# Patient Record
Sex: FEMALE | HISPANIC OR LATINO | Employment: STUDENT | URBAN - METROPOLITAN AREA
[De-identification: names, ages, dates, MRNs, and addresses within clinical notes are randomized per-mention and may not be internally consistent; named-entity substitution may affect disease eponyms.]

---

## 2020-10-06 ENCOUNTER — OFFICE VISIT (OUTPATIENT)
Dept: OBSTETRICS AND GYNECOLOGY | Facility: CLINIC | Age: 20
End: 2020-10-06

## 2020-10-06 VITALS
SYSTOLIC BLOOD PRESSURE: 118 MMHG | BODY MASS INDEX: 28.47 KG/M2 | HEIGHT: 60 IN | WEIGHT: 145 LBS | DIASTOLIC BLOOD PRESSURE: 78 MMHG

## 2020-10-06 DIAGNOSIS — R30.0 DYSURIA: ICD-10-CM

## 2020-10-06 DIAGNOSIS — B37.9 YEAST INFECTION: ICD-10-CM

## 2020-10-06 DIAGNOSIS — N90.89 VULVAR LESION: ICD-10-CM

## 2020-10-06 DIAGNOSIS — N76.0 ACUTE VAGINITIS: Primary | ICD-10-CM

## 2020-10-06 LAB
BILIRUB BLD-MCNC: NEGATIVE MG/DL
GLUCOSE UR STRIP-MCNC: NEGATIVE MG/DL
KETONES UR QL: NEGATIVE
LEUKOCYTE EST, POC: ABNORMAL
NITRITE UR-MCNC: NEGATIVE MG/ML
PH UR: 6 [PH] (ref 5–8)
PROT UR STRIP-MCNC: ABNORMAL MG/DL
RBC # UR STRIP: ABNORMAL /UL
SP GR UR: 1.02 (ref 1–1.03)
UROBILINOGEN UR QL: NORMAL
WET PREP GENITAL: NORMAL

## 2020-10-06 PROCEDURE — 99213 OFFICE O/P EST LOW 20 MIN: CPT | Performed by: NURSE PRACTITIONER

## 2020-10-06 PROCEDURE — 87210 SMEAR WET MOUNT SALINE/INK: CPT | Performed by: NURSE PRACTITIONER

## 2020-10-06 PROCEDURE — 81002 URINALYSIS NONAUTO W/O SCOPE: CPT | Performed by: NURSE PRACTITIONER

## 2020-10-06 RX ORDER — ACYCLOVIR 400 MG/1
400 TABLET ORAL 3 TIMES DAILY
Qty: 21 TABLET | Refills: 0 | Status: SHIPPED | OUTPATIENT
Start: 2020-10-06 | End: 2020-10-13

## 2020-10-06 RX ORDER — NORETHINDRONE ACETATE AND ETHINYL ESTRADIOL 1MG-20(21)
KIT ORAL
COMMUNITY
Start: 2020-07-28 | End: 2020-10-06 | Stop reason: SDUPTHER

## 2020-10-06 RX ORDER — NORETHINDRONE ACETATE AND ETHINYL ESTRADIOL 1MG-20(21)
1 KIT ORAL DAILY
Qty: 28 TABLET | Refills: 12 | OUTPATIENT
Start: 2020-10-06 | End: 2022-01-05

## 2020-10-06 RX ORDER — CABERGOLINE 0.5 MG/1
0.25 TABLET ORAL 2 TIMES WEEKLY
Qty: 8 TABLET | Refills: 12 | Status: SHIPPED | OUTPATIENT
Start: 2020-10-08 | End: 2022-01-01

## 2020-10-06 RX ORDER — FLUCONAZOLE 150 MG/1
TABLET ORAL
Qty: 3 TABLET | Refills: 1 | Status: SHIPPED | OUTPATIENT
Start: 2020-10-06 | End: 2021-01-15

## 2020-10-06 NOTE — PROGRESS NOTES
"Chief Complaint   Patient presents with   • Vaginal Discharge   • Dysuria   • Vaginal Itching       Subjective   HPI  Madalyn Alexandre is a 20 y.o. female, No obstetric history on file., who presents for possible UTI or yeast infection.     Urinary Tract Infection  Patient complains of yellow vaginal discharge, mild burning with urination, and itching. She has had symptoms for 5 days. Patient also complains of small paper cut like lesions. Patient denies urinary frequnecy and urgency, fever, chills. . Patient does not have a history of recurrent UTI or vaginal infections. Patient does admit to having a new sexual partner.    Pt currently being treated for strep throat, although her strep test was negative. Her vaginal symptoms started before she started taking abx.       Health Maintenance   Topic Date Due   • Annual Gynecologic Pelvic and Breast Exam  1998   • ANNUAL PHYSICAL  04/03/2001   • HPV VACCINES (1 - 2-dose series) 04/03/2009   • TDAP/TD VACCINES (1 - Tdap) 04/03/2017   • INFLUENZA VACCINE  08/01/2020   • HEPATITIS C SCREENING  09/08/2020   • CHLAMYDIA SCREENING  09/08/2020   • PAP SMEAR  09/08/2020   • Pneumococcal Vaccine 0-64  Aged Out   • MENINGOCOCCAL VACCINE (Normal Risk)  Aged Out       The additional following portions of the patient's history were reviewed and updated as appropriate: allergies, current medications, past family history, past medical history, past social history, past surgical history and problem list.    Review of Systems   Constitutional: Negative.    Gastrointestinal: Negative.    Genitourinary:        Itching, discharge, vulvar lesions   Psychiatric/Behavioral: Negative.    All other systems reviewed and are negative.      I have reviewed and agree with the HPI, ROS, and historical information as entered above. Mae Griffin, APRN    Objective   /78   Ht 152.4 cm (60\")   Wt 65.8 kg (145 lb)   Breastfeeding No   BMI 28.32 kg/m²     Physical Exam  Vitals signs and " nursing note reviewed. Exam conducted with a chaperone present.   Constitutional:       Appearance: Normal appearance.   Pulmonary:      Effort: Pulmonary effort is normal.   Abdominal:      General: Abdomen is flat.      Palpations: Abdomen is soft.   Genitourinary:     Labia:         Right: No rash, tenderness or lesion.         Left: No rash, tenderness or lesion.       Urethra: No urethral lesion.      Vagina: Vaginal discharge (copious amt of white/yellow dischage) and lesions (group of ulcerative type lesion at posterior vaginal opening) present.      Cervix: Discharge present. No friability, lesion or cervical bleeding.      Uterus: Normal. Not enlarged and not tender.       Adnexa: Right adnexa normal and left adnexa normal.      Rectum: Normal.   Lymphadenopathy:      Lower Body: No right inguinal adenopathy. No left inguinal adenopathy.   Neurological:      Mental Status: She is alert and oriented to person, place, and time.   Psychiatric:         Behavior: Behavior normal.         Assessment/Plan     Assessment     Problem List Items Addressed This Visit     None      Visit Diagnoses     Acute vaginitis    -  Primary    Relevant Orders    Chlamydia trachomatis, Neisseria gonorrhoeae, Trichomonas vaginalis, PCR - Swab, Cervix    POC Wet Prep (Completed)    Dysuria        Relevant Orders    Urine Culture - Urine, Urine, Clean Catch    POC Urinalysis Dipstick (Completed)    Vulvar lesion        Relevant Orders    HSV 1/2, PCR - Swab, Vagina    Yeast infection        Relevant Medications    fluconazole (Diflucan) 150 MG tablet          Plan     1.   Wet prep +yeast, severe, Rx diflucan.   2.   Small ulcerative type lesions, HSV culture sent, will start acyclovir.  3.   New partner, Nuswab sent. Enc condoms.   4.   CCUA large leuks, sent for culture, will treat if indicated.     Mae Griffin, DEANNA  10/06/2020

## 2020-10-08 LAB
BACTERIA UR CULT: NORMAL
BACTERIA UR CULT: NORMAL
C TRACH RRNA SPEC QL NAA+PROBE: POSITIVE
N GONORRHOEA RRNA SPEC QL NAA+PROBE: NEGATIVE
T VAGINALIS DNA SPEC QL NAA+PROBE: NEGATIVE

## 2020-10-09 ENCOUNTER — TELEPHONE (OUTPATIENT)
Dept: OBSTETRICS AND GYNECOLOGY | Facility: CLINIC | Age: 20
End: 2020-10-09

## 2020-10-09 LAB
HSV1 DNA SPEC QL NAA+PROBE: NEGATIVE
HSV2 DNA SPEC QL NAA+PROBE: NEGATIVE

## 2020-10-09 RX ORDER — AZITHROMYCIN 500 MG/1
1000 TABLET, FILM COATED ORAL ONCE
Qty: 2 TABLET | Refills: 0 | Status: SHIPPED | OUTPATIENT
Start: 2020-10-09 | End: 2020-10-09

## 2020-11-03 ENCOUNTER — OFFICE VISIT (OUTPATIENT)
Dept: OBSTETRICS AND GYNECOLOGY | Facility: CLINIC | Age: 20
End: 2020-11-03

## 2020-11-03 VITALS
HEIGHT: 59 IN | BODY MASS INDEX: 29.84 KG/M2 | WEIGHT: 148 LBS | DIASTOLIC BLOOD PRESSURE: 60 MMHG | SYSTOLIC BLOOD PRESSURE: 98 MMHG

## 2020-11-03 DIAGNOSIS — Z01.419 WOMEN'S ANNUAL ROUTINE GYNECOLOGICAL EXAMINATION: Primary | ICD-10-CM

## 2020-11-03 DIAGNOSIS — Z30.41 ORAL CONTRACEPTIVE USE: ICD-10-CM

## 2020-11-03 DIAGNOSIS — Z20.2 CHLAMYDIA CONTACT, TREATED: ICD-10-CM

## 2020-11-03 PROCEDURE — 99395 PREV VISIT EST AGE 18-39: CPT | Performed by: NURSE PRACTITIONER

## 2020-11-03 RX ORDER — AMOXICILLIN 500 MG/1
CAPSULE ORAL
COMMUNITY
Start: 2020-10-02 | End: 2021-01-15

## 2020-11-03 NOTE — PROGRESS NOTES
GYN Annual Exam     CC - Here for annual exam.        HPI  Madalyn Alexandre is a 20 y.o. female, , who presents for annual well woman exam. Patient's last menstrual period was 2020 (within weeks)..  Periods are irregular because patient reports she forgets to take her cabergoline.  She has had irregular periods since starting her period.  When she does have her periods, she bleeds about 3 days, moderate flow, with blood clots.  Dysmenorrhea:mild, occurring premenstrually.  Patient reports problems with: none.  There were no changes to her medical or surgical history since her last visit.. Partner Status: Marital Status: single.  New Partners since last visit: no.  Desires STD Screening: yes.  She tested positive for Chlamydia on 10/6/2020 and she reports that she did complete the medication for it.     Additional OB/GYN History   Current contraception: contraceptive methods: OCP (estrogen/progesterone)  Desires to: continue contraception  Last Pap :   Last Completed Pap Smear       Status Date      PAP SMEAR Done 2019 Negative, HPV negative        History of abnormal Pap smear: no  Family history of uterine, colon, breast, or ovarian cancer: no  Performs monthly Self-Breast Exam: yes  Exercises Regularly:no  Feelings of Anxiety or Depression: no  Tobacco Usage?: No   OB History        0    Para   0    Term   0       0    AB   0    Living   0       SAB   0    TAB   0    Ectopic   0    Molar   0    Multiple   0    Live Births   0                Health Maintenance   Topic Date Due   • Annual Gynecologic Pelvic and Breast Exam  1998   • ANNUAL PHYSICAL  2001   • HPV VACCINES (1 - 2-dose series) 2009   • TDAP/TD VACCINES (1 - Tdap) 2017   • INFLUENZA VACCINE  2020   • HEPATITIS C SCREENING  2020   • CHLAMYDIA SCREENING  10/06/2021   • PAP SMEAR  2022   • Pneumococcal Vaccine 0-64  Aged Out   • MENINGOCOCCAL VACCINE (Normal Risk)  Aged Out       The  "additional following portions of the patient's history were reviewed and updated as appropriate: allergies, current medications, past family history, past medical history, past social history, past surgical history and problem list.    Review of Systems   Constitutional: Negative.    HENT: Negative.    Eyes: Negative.    Respiratory: Negative.    Cardiovascular: Negative.    Gastrointestinal: Positive for constipation.   Endocrine: Negative.    Genitourinary: Negative.    Musculoskeletal: Negative.    Skin: Negative.    Allergic/Immunologic: Negative.    Neurological: Negative.    Hematological: Negative.    Psychiatric/Behavioral: Negative.      All other systems reviewed and are negative.     I have reviewed and agree with the HPI, ROS, and historical information as entered above. Mae Griffin, APRN    Objective   BP 98/60   Ht 149.9 cm (59\")   Wt 67.1 kg (148 lb)   LMP 09/30/2020 (Within Weeks)   Breastfeeding No   BMI 29.89 kg/m²     Physical Exam  Vitals signs and nursing note reviewed. Exam conducted with a chaperone present.   Constitutional:       Appearance: She is well-developed.   HENT:      Head: Normocephalic and atraumatic.   Neck:      Musculoskeletal: Normal range of motion. No muscular tenderness.      Thyroid: No thyroid mass or thyromegaly.   Cardiovascular:      Rate and Rhythm: Normal rate and regular rhythm.      Heart sounds: No murmur.   Pulmonary:      Effort: Pulmonary effort is normal. No retractions.      Breath sounds: Normal breath sounds. No wheezing, rhonchi or rales.   Chest:      Chest wall: No mass or tenderness.      Breasts:         Right: Normal. No mass, nipple discharge, skin change or tenderness.         Left: Normal. No mass, nipple discharge, skin change or tenderness.   Abdominal:      General: Bowel sounds are normal.      Palpations: Abdomen is soft. Abdomen is not rigid. There is no mass.      Tenderness: There is no abdominal tenderness. There is no guarding.      " Hernia: No hernia is present. There is no hernia in the left inguinal area.   Genitourinary:     Labia:         Right: No rash, tenderness or lesion.         Left: No rash, tenderness or lesion.       Vagina: Normal. No vaginal discharge or lesions.      Cervix: No cervical motion tenderness, discharge, lesion or cervical bleeding.      Uterus: Normal. Not enlarged, not fixed and not tender.       Adnexa:         Right: No mass or tenderness.          Left: No mass or tenderness.        Rectum: No external hemorrhoid.   Neurological:      Mental Status: She is alert and oriented to person, place, and time.   Psychiatric:         Behavior: Behavior normal.            Assessment and Plan    Problem List Items Addressed This Visit     None      Visit Diagnoses     Women's annual routine gynecological examination    -  Primary    Relevant Orders    Pap IG, Ct-Ng, Rfx HPV ASCU    Oral contraceptive use        Chlamydia contact, treated        Test of cure today          1. GYN annual well woman exam.   2. Encouraged use of condoms for STD prevention.  3. Reviewed monthly self breast exams.  Instructed to call with lumps, pain, or breast discharge.    4. Reviewed exercise as a preventative health measures.   5. Reccommended Flu Vaccine in Fall of each year.  6. RTC in 1 year or PRN with problems  7. Other: BINTA for chlamydia today, will call with results      DEANNA Parsons  11/03/2020

## 2020-11-10 DIAGNOSIS — Z01.419 WOMEN'S ANNUAL ROUTINE GYNECOLOGICAL EXAMINATION: ICD-10-CM

## 2021-01-15 ENCOUNTER — TELEMEDICINE (OUTPATIENT)
Dept: FAMILY MEDICINE CLINIC | Facility: TELEHEALTH | Age: 21
End: 2021-01-15

## 2021-01-15 VITALS — TEMPERATURE: 98.4 F

## 2021-01-15 DIAGNOSIS — R59.0 LYMPHADENOPATHY, ANTERIOR CERVICAL: Primary | ICD-10-CM

## 2021-01-15 PROCEDURE — 99213 OFFICE O/P EST LOW 20 MIN: CPT | Performed by: NURSE PRACTITIONER

## 2021-01-15 RX ORDER — AMOXICILLIN AND CLAVULANATE POTASSIUM 875; 125 MG/1; MG/1
1 TABLET, FILM COATED ORAL 2 TIMES DAILY
Qty: 14 TABLET | Refills: 0 | Status: SHIPPED | OUTPATIENT
Start: 2021-01-15 | End: 2021-01-22

## 2021-01-15 NOTE — PATIENT INSTRUCTIONS
If no improvement in size of lymph node with completion of antibiotic or if it worsens, seek further evaluation at Urgent Care or with your Primary Care Provider.    Lymphadenopathy    Lymphadenopathy means that your lymph glands are swollen or larger than normal (enlarged). Lymph glands, also called lymph nodes, are collections of tissue that filter bacteria, viruses, and waste from your bloodstream. They are part of your body's disease-fighting system (immune system), which protects your body from germs.  There may be different causes of lymphadenopathy, depending on where it is in your body. Some types go away on their own. Lymphadenopathy can occur anywhere that you have lymph glands, including these areas:  · Neck (cervical lymphadenopathy).  · Chest (mediastinal lymphadenopathy).  · Lungs (hilar lymphadenopathy).  · Underarms (axillary lymphadenopathy).  · Groin (inguinal lymphadenopathy).  When your immune system responds to germs, infection-fighting cells and fluid build up in your lymph glands. This causes some swelling and enlargement. If the lymph glands do not go back to normal after you have an infection or disease, your health care provider may do tests. These tests help to monitor your condition and find the reason why the glands are still swollen and enlarged.  Follow these instructions at home:  · Get plenty of rest.  · Take over-the-counter and prescription medicines only as told by your health care provider. Your health care provider may recommend over-the-counter medicines for pain.  · If directed, apply heat to swollen lymph glands as often as told by your health care provider. Use the heat source that your health care provider recommends, such as a moist heat pack or a heating pad.  ? Place a towel between your skin and the heat source.  ? Leave the heat on for 20-30 minutes.  ? Remove the heat if your skin turns bright red. This is especially important if you are unable to feel pain, heat, or  cold. You may have a greater risk of getting burned.  · Check your affected lymph glands every day for changes. Check other lymph gland areas as told by your health care provider. Check for changes such as:  ? More swelling.  ? Sudden increase in size.  ? Redness or pain.  ? Hardness.  · Keep all follow-up visits as told by your health care provider. This is important.  Contact a health care provider if you have:  · Swelling that gets worse or spreads to other areas.  · Problems with breathing.  · Lymph glands that:  ? Are still swollen after 2 weeks.  ? Have suddenly gotten bigger.  ? Are red, painful, or hard.  · A fever or chills.  · Fatigue.  · A sore throat.  · Pain in your abdomen.  · Weight loss.  · Night sweats.  Get help right away if you have:  · Fluid leaking from an enlarged lymph gland.  · Severe pain.  · Chest pain.  · Shortness of breath.  Summary  · Lymphadenopathy means that your lymph glands are swollen or larger than normal (enlarged).  · Lymph glands (also called lymph nodes) are collections of tissue that filter bacteria, viruses, and waste from the bloodstream. They are part of your body's disease-fighting system (immune system).  · Lymphadenopathy can occur anywhere that you have lymph glands.  · If your enlarged and swollen lymph glands do not go back to normal after you have an infection or disease, your health care provider may do tests to monitor your condition and find the reason why the glands are still swollen and enlarged.  · Check your affected lymph glands every day for changes. Check other lymph gland areas as told by your health care provider.  This information is not intended to replace advice given to you by your health care provider. Make sure you discuss any questions you have with your health care provider.  Document Revised: 11/30/2018 Document Reviewed: 11/02/2018  Elsevier Patient Education © 2020 [employer]

## 2021-01-15 NOTE — PROGRESS NOTES
Chief Complaint  Swollen Glands (chills and side of neck is swollen)    Subjective          Madalyn Alexandre presents to [facility] for   Felt enlarged left swollen gland. She denies sore throat or ear pain. She does report some teeth sensitivity to cold and has not been to the dentist in well over a year with possibility of cavity. She denies any previous pain in this area before, however. She has checked her temperature, but has been afebrile, today she has felt chilled and fatigued. She reports a history of strep throat, last fall and chlamydia.    Swollen Glands  This is a new problem. The current episode started in the past 7 days. The problem occurs constantly. The problem has been unchanged. Associated symptoms include chills, fatigue and swollen glands. Pertinent negatives include no congestion, coughing, fever, headaches, joint swelling, myalgias, nausea, neck pain, rash, sore throat, vertigo, visual change or weakness. Nothing aggravates the symptoms. She has tried nothing for the symptoms.       Objective   Vital Signs:   Temp 98.4 °F (36.9 °C) (Oral)     Physical Exam  Constitutional:       Appearance: She is not ill-appearing.   HENT:      Head:      Salivary Glands: Right salivary gland is not diffusely enlarged. Left salivary gland is not diffusely enlarged.      Nose: No congestion or rhinorrhea.      Mouth/Throat:      Mouth: Mucous membranes are moist.      Pharynx: Posterior oropharyngeal erythema present. No oropharyngeal exudate or uvula swelling.   Neck:     Neurological:      Mental Status: She is alert.        Result Review :                 Assessment and Plan    Problem List Items Addressed This Visit     None          Follow Up   No follow-ups on file.  Patient was given instructions and counseling regarding her condition or for health maintenance advice. Please see specific information pulled into the AVS if appropriate.

## 2021-02-07 PROCEDURE — U0004 COV-19 TEST NON-CDC HGH THRU: HCPCS | Performed by: NURSE PRACTITIONER

## 2022-01-01 RX ORDER — CABERGOLINE 0.5 MG/1
0.25 TABLET ORAL 2 TIMES WEEKLY
Qty: 8 TABLET | Refills: 0 | Status: SHIPPED | OUTPATIENT
Start: 2022-01-01 | End: 2022-04-12 | Stop reason: SDUPTHER

## 2022-01-05 PROCEDURE — U0004 COV-19 TEST NON-CDC HGH THRU: HCPCS | Performed by: NURSE PRACTITIONER

## 2022-04-12 ENCOUNTER — OFFICE VISIT (OUTPATIENT)
Dept: OBSTETRICS AND GYNECOLOGY | Facility: CLINIC | Age: 22
End: 2022-04-12

## 2022-04-12 VITALS
BODY MASS INDEX: 28.83 KG/M2 | HEIGHT: 59 IN | WEIGHT: 143 LBS | SYSTOLIC BLOOD PRESSURE: 96 MMHG | DIASTOLIC BLOOD PRESSURE: 60 MMHG

## 2022-04-12 DIAGNOSIS — N92.6 IRREGULAR PERIODS: ICD-10-CM

## 2022-04-12 DIAGNOSIS — Z01.419 WOMEN'S ANNUAL ROUTINE GYNECOLOGICAL EXAMINATION: Primary | ICD-10-CM

## 2022-04-12 DIAGNOSIS — E22.1 HYPERPROLACTINEMIA: ICD-10-CM

## 2022-04-12 PROCEDURE — 99395 PREV VISIT EST AGE 18-39: CPT | Performed by: NURSE PRACTITIONER

## 2022-04-12 RX ORDER — CABERGOLINE 0.5 MG/1
0.5 TABLET ORAL 2 TIMES WEEKLY
Qty: 8 TABLET | Refills: 12 | Status: SHIPPED | OUTPATIENT
Start: 2022-04-14

## 2022-04-12 NOTE — PROGRESS NOTES
GYN Annual Exam     CC - Here for annual exam.        HPI  Madalyn Alexandre is a 22 y.o. female, , who presents for annual well woman exam. Patient's last menstrual period was 2021 (within days)..  Periods are irregular, patient is not sure how often that occur, she is supposed to be taking cabergoline. She has been taking it since she was 14, but she forgets to take it a lot.     Dysmenorrhea:none.  Patient reports problems with: none.  There were no changes to her medical or surgical history since her last visit.. Partner Status: Marital Status: single.  She is sexually active. She has had new partners since her last STD testing. She does desire STD testing. . She has had her HPV vaccines per patient.    Additional OB/GYN History   Current contraception: contraceptive methods: Condoms  Desires to: do not start contraception  Last Pap : 11/3/20. Results: Rfx hpv normal.   Last Completed Pap Smear          Ordered - PAP SMEAR (Every 3 Years) Ordered on 2020  Pap IG, Ct-Ng, Rfx HPV ASCU    2019  Done - Negative, HPV negative              History of abnormal Pap smear: no  Family history of uterine, colon, breast, or ovarian cancer: no  Performs monthly Self-Breast Exam: yes  Exercises Regularly:yes  Feelings of Anxiety or Depression: no  Tobacco Usage?: No   OB History        0    Para   0    Term   0       0    AB   0    Living   0       SAB   0    IAB   0    Ectopic   0    Molar   0    Multiple   0    Live Births   0                Health Maintenance   Topic Date Due   • ANNUAL PHYSICAL  Never done   • COVID-19 Vaccine (1) Never done   • HPV VACCINES (1 - 2-dose series) Never done   • TDAP/TD VACCINES (1 - Tdap) Never done   • HEPATITIS C SCREENING  Never done   • CHLAMYDIA SCREENING  2021   • Annual Gynecologic Pelvic and Breast Exam  2021   • INFLUENZA VACCINE  2022   • PAP SMEAR  2023   • Pneumococcal Vaccine 0-64  Aged Out       The  "additional following portions of the patient's history were reviewed and updated as appropriate: allergies, current medications, past family history, past medical history, past social history, past surgical history and problem list.    Review of Systems   Constitutional: Negative.    HENT: Negative.    Respiratory: Negative.    Cardiovascular: Negative.    Gastrointestinal: Negative.    Genitourinary: Negative.         Irreg periods   Musculoskeletal: Negative.    Skin: Negative.    Allergic/Immunologic: Negative.    Neurological: Negative.    Hematological: Negative.    Psychiatric/Behavioral: Negative.          I have reviewed and agree with the HPI, ROS, and historical information as entered above. Mae Elias, APRN    Objective   BP 96/60   Ht 149.9 cm (59.02\")   Wt 64.9 kg (143 lb)   LMP 12/14/2021 (Within Days)   BMI 28.87 kg/m²     Physical Exam  Vitals and nursing note reviewed. Exam conducted with a chaperone present.   Constitutional:       Appearance: She is well-developed.   HENT:      Head: Normocephalic and atraumatic.   Neck:      Thyroid: No thyroid mass or thyromegaly.   Cardiovascular:      Rate and Rhythm: Normal rate and regular rhythm.      Heart sounds: No murmur heard.  Pulmonary:      Effort: Pulmonary effort is normal. No retractions.      Breath sounds: Normal breath sounds. No wheezing, rhonchi or rales.   Chest:      Chest wall: No mass or tenderness.   Breasts:      Right: Normal. No mass, nipple discharge, skin change or tenderness.      Left: Normal. No mass, nipple discharge, skin change or tenderness.       Abdominal:      General: Bowel sounds are normal.      Palpations: Abdomen is soft. Abdomen is not rigid. There is no mass.      Tenderness: There is no abdominal tenderness. There is no guarding.      Hernia: No hernia is present. There is no hernia in the left inguinal area.   Genitourinary:     Labia:         Right: No rash, tenderness or lesion.         Left: No rash, " tenderness or lesion.       Vagina: Normal. No vaginal discharge or lesions.      Cervix: No cervical motion tenderness, discharge, lesion or cervical bleeding.      Uterus: Normal. Not enlarged, not fixed and not tender.       Adnexa:         Right: No mass or tenderness.          Left: No mass or tenderness.        Rectum: No external hemorrhoid.   Musculoskeletal:      Cervical back: Normal range of motion. No muscular tenderness.   Neurological:      Mental Status: She is alert and oriented to person, place, and time.   Psychiatric:         Behavior: Behavior normal.            Assessment and Plan    Problem List Items Addressed This Visit    None     Visit Diagnoses     Women's annual routine gynecological examination    -  Primary    Relevant Orders    Pap IG, Ct-Ng, Rfx HPV ASCU    Hyperprolactinemia (HCC)        Relevant Orders    Prolactin    TSH    Irregular periods        Relevant Orders    TSH          1. GYN annual well woman exam.   2. Encouraged use of condoms for STD prevention.  3. Reviewed monthly self breast exams.  Instructed to call with lumps, pain, or breast discharge.    4. Reviewed exercise as a preventative health measures.    5. Irreg periods - known h/o hyperprolactinemia and does not regularly take her cabergoline. Will check labs today. Enc to take medication as prescribed.   6. Follow up in 1 year for annual, or sooner if needed.       Mae rGiffin, DEANNA  04/12/2022

## 2022-04-13 ENCOUNTER — TELEPHONE (OUTPATIENT)
Dept: OBSTETRICS AND GYNECOLOGY | Facility: CLINIC | Age: 22
End: 2022-04-13

## 2022-04-13 DIAGNOSIS — A74.9 CHLAMYDIA: Primary | ICD-10-CM

## 2022-04-13 LAB
PROLACTIN SERPL-MCNC: 5.1 NG/ML (ref 4.8–23.3)
TSH SERPL DL<=0.005 MIU/L-ACNC: 2.59 UIU/ML (ref 0.27–4.2)

## 2022-04-14 RX ORDER — DOXYCYCLINE 100 MG/1
100 CAPSULE ORAL 2 TIMES DAILY
Qty: 14 CAPSULE | Refills: 0 | Status: SHIPPED | OUTPATIENT
Start: 2022-04-14 | End: 2022-04-21

## 2022-04-14 NOTE — TELEPHONE ENCOUNTER
Reviewed results with pt and let her know that I will send in doxycycline 100mg BID X7 days and that she will need to come in, in 4 weeks for BINTA and her partner needs to be tested and treated as well and that she should refrain from IC until BINTA.     She VU

## 2022-04-14 NOTE — ADDENDUM NOTE
Addended by: BARON ODONNELL on: 4/14/2022 05:00 PM     Modules accepted: Orders     Completed paperwork received    Copy to scan  Copy to file in [facility]  Copy to desk in [address] for   Note to appt    Patient notified of completion via VM.

## 2022-04-26 DIAGNOSIS — Z01.419 WOMEN'S ANNUAL ROUTINE GYNECOLOGICAL EXAMINATION: ICD-10-CM

## 2022-05-03 RX ORDER — FLUCONAZOLE 150 MG/1
TABLET ORAL
Qty: 2 TABLET | Refills: 0 | Status: SHIPPED | OUTPATIENT
Start: 2022-05-03 | End: 2022-06-06

## 2022-05-03 RX ORDER — METRONIDAZOLE 500 MG/1
500 TABLET ORAL 2 TIMES DAILY
Qty: 14 TABLET | Refills: 0 | Status: SHIPPED | OUTPATIENT
Start: 2022-05-03 | End: 2022-05-10

## 2022-05-10 ENCOUNTER — OFFICE VISIT (OUTPATIENT)
Dept: OBSTETRICS AND GYNECOLOGY | Facility: CLINIC | Age: 22
End: 2022-05-10

## 2022-05-10 VITALS — WEIGHT: 143 LBS | DIASTOLIC BLOOD PRESSURE: 60 MMHG | SYSTOLIC BLOOD PRESSURE: 98 MMHG | BODY MASS INDEX: 28.87 KG/M2

## 2022-05-10 DIAGNOSIS — Z20.2 CHLAMYDIA CONTACT, TREATED: Primary | ICD-10-CM

## 2022-05-10 PROCEDURE — 99213 OFFICE O/P EST LOW 20 MIN: CPT | Performed by: NURSE PRACTITIONER

## 2022-05-10 NOTE — PROGRESS NOTES
Chief Complaint   Patient presents with   • Test of Cure       Subjective   HPI  Madalyn Alexandre is a 22 y.o. female, , who presents for a  Test of cure.      Patient with +Chlamydia 2022 on her pap smear.    She reports that she took medication, and partner was treated. She also reports that she has abstained from intercourse.  She denies any additional questions, concerns or complaints.     Her last LMP was Patient's last menstrual period was 2022 (exact date)..  Periods are irregular.   Patient reports problems with: none.  New Partners since last visit: no.  Desires STD Screening: BINTA.    Additional OB/GYN History   Current contraception: contraceptive methods: Condoms  Desires to: do not start contraception  Last Pap : 2020  Last Completed Pap Smear          Ordered - PAP SMEAR (Every 3 Years) Ordered on 2022  SCANNED - PAP SMEAR    2022  SCANNED - PAP SMEAR    2020  Pap IG, Ct-Ng, Rfx HPV ASCU    2019  Done - Negative, HPV negative              History of abnormal Pap smear: no    Tobacco Usage?: No   OB History        0    Para   0    Term   0       0    AB   0    Living   0       SAB   0    IAB   0    Ectopic   0    Molar   0    Multiple   0    Live Births   0                Health Maintenance   Topic Date Due   • ANNUAL PHYSICAL  Never done   • COVID-19 Vaccine (1) Never done   • HPV VACCINES (1 - 2-dose series) Never done   • TDAP/TD VACCINES (1 - Tdap) Never done   • HEPATITIS C SCREENING  Never done   • CHLAMYDIA SCREENING  2021   • INFLUENZA VACCINE  2022   • Annual Gynecologic Pelvic and Breast Exam  2023   • PAP SMEAR  2025   • Pneumococcal Vaccine 0-64  Aged Out       The additional following portions of the patient's history were reviewed and updated as appropriate: allergies, current medications, past family history, past medical history, past social history, past surgical history and problem  list.    Review of Systems   All other systems reviewed and are negative.      I have reviewed and agree with the HPI, ROS, and historical information as entered above. Mae Griffin, DEANNA    Objective   BP 98/60   Wt 64.9 kg (143 lb)   LMP 05/08/2022 (Exact Date)   Breastfeeding No   BMI 28.87 kg/m²     Physical Exam  Vitals and nursing note reviewed. Exam conducted with a chaperone present.   Constitutional:       Appearance: Normal appearance.   HENT:      Head: Normocephalic and atraumatic.   Pulmonary:      Effort: Pulmonary effort is normal.   Genitourinary:     Labia:         Right: No rash, tenderness or lesion.         Left: No rash, tenderness or lesion.       Vagina: Normal. No lesions.      Cervix: Cervical bleeding (on period) present. No cervical motion tenderness, discharge or lesion.      Uterus: Normal. Not enlarged, not fixed and not tender.       Adnexa:         Right: No mass or tenderness.          Left: No mass or tenderness.        Rectum: No external hemorrhoid.      Comments: Chaperone Present  Neurological:      Mental Status: She is alert and oriented to person, place, and time.   Psychiatric:         Behavior: Behavior normal.         [unfilled]    Assessment     Problem List Items Addressed This Visit    None     Visit Diagnoses     Chlamydia contact, treated    -  Primary    Relevant Orders    Chlamydia trachomatis, Neisseria gonorrhoeae, PCR w/ confirmation - Swab, Cervix          Plan     1. Test of cure, chlamydia. Encouraged condom use.   2. Irregular menses since stopping OCP's last November 2021. Currently on cycle. Continue to monitor for now and call if continue to be irregular or if has amenorrhea. Recent prolactin level was normal.   3. Follow up at time of annual, or sooner if needed.       DEANNA Parsons  05/10/2022

## 2022-05-12 LAB
C TRACH RRNA SPEC QL NAA+PROBE: NEGATIVE
N GONORRHOEA RRNA SPEC QL NAA+PROBE: NEGATIVE

## 2022-06-06 ENCOUNTER — OFFICE VISIT (OUTPATIENT)
Dept: OBSTETRICS AND GYNECOLOGY | Facility: CLINIC | Age: 22
End: 2022-06-06

## 2022-06-06 VITALS
DIASTOLIC BLOOD PRESSURE: 60 MMHG | SYSTOLIC BLOOD PRESSURE: 100 MMHG | WEIGHT: 139.4 LBS | BODY MASS INDEX: 27.37 KG/M2 | HEIGHT: 60 IN

## 2022-06-06 DIAGNOSIS — B37.9 YEAST INFECTION: Primary | ICD-10-CM

## 2022-06-06 DIAGNOSIS — Z11.3 SCREENING FOR STDS (SEXUALLY TRANSMITTED DISEASES): ICD-10-CM

## 2022-06-06 DIAGNOSIS — N89.8 VAGINAL DISCHARGE: ICD-10-CM

## 2022-06-06 LAB
KOH PREP NAIL: ABNORMAL
WET PREP GENITAL: NORMAL

## 2022-06-06 PROCEDURE — 87210 SMEAR WET MOUNT SALINE/INK: CPT | Performed by: NURSE PRACTITIONER

## 2022-06-06 PROCEDURE — 87220 TISSUE EXAM FOR FUNGI: CPT | Performed by: NURSE PRACTITIONER

## 2022-06-06 PROCEDURE — 99213 OFFICE O/P EST LOW 20 MIN: CPT | Performed by: NURSE PRACTITIONER

## 2022-06-06 RX ORDER — FLUCONAZOLE 150 MG/1
TABLET ORAL
Qty: 3 TABLET | Refills: 0 | Status: SHIPPED | OUTPATIENT
Start: 2022-06-06

## 2022-06-06 NOTE — PROGRESS NOTES
"Chief Complaint   Patient presents with   • Vaginitis         Subjective   HPI  Madalyn Alexandre is a 22 y.o. female, , who presents with evaluation of vaginal discharge.  She describes this as white and vulvar itching. Patient stated that her discharge is thick.      She states she has experienced this problem for 1 week.  Patient stated that at today's visit the severity is moderate. Patient stated that her vagina is irritated. The patient has not previously been evaluated for vaginitis.    Her last LMP was Patient's last menstrual period was 2022 (exact date)..  Periods are irregular, lasting 3-4 days.  Partner Status: Marital Status: single.  New Partners since last visit: yes.  Desires STD Screening: yes.    Additional OB/GYN History   Last Pap :   Last Completed Pap Smear          Ordered - PAP SMEAR (Every 3 Years) Ordered on 2022  SCANNED - PAP SMEAR    2022  SCANNED - PAP SMEAR    2020  Pap IG, Ct-Ng, Rfx HPV ASCU    2019  Done - Negative, HPV negative              History of abnormal Pap smear: no  OB History        0    Para   0    Term   0       0    AB   0    Living   0       SAB   0    IAB   0    Ectopic   0    Molar   0    Multiple   0    Live Births   0                The additional following portions of the patient's history were reviewed and updated as appropriate: allergies and current medications.    Review of Systems   Constitutional: Negative for chills and fever.   Genitourinary: Positive for vaginal discharge (itching). Negative for dysuria and pelvic pain.   All other systems reviewed and are negative.    All other systems reviewed and are negative.     I have reviewed and agree with the HPI, ROS, and historical information as entered above. Mae Griffin, APRN    Objective   /60   Ht 152.4 cm (60\")   Wt 63.2 kg (139 lb 6.4 oz)   LMP 2022 (Exact Date)   Breastfeeding No   BMI 27.22 kg/m²     Physical Exam  Vitals " and nursing note reviewed. Exam conducted with a chaperone present.   Constitutional:       Appearance: Normal appearance.   HENT:      Head: Normocephalic and atraumatic.   Pulmonary:      Effort: Pulmonary effort is normal.   Genitourinary:     Labia:         Right: No rash, tenderness or lesion.         Left: No rash, tenderness or lesion.       Vagina: Vaginal discharge present. No lesions.      Cervix: Cervical bleeding (scant) present. No cervical motion tenderness, discharge or lesion.      Uterus: Normal. Not enlarged, not fixed and not tender.       Adnexa:         Right: No mass or tenderness.          Left: No mass or tenderness.        Rectum: No external hemorrhoid.      Comments: Chaperone Present  Neurological:      Mental Status: She is alert and oriented to person, place, and time.   Psychiatric:         Behavior: Behavior normal.         Wet mount performed? Yes. Pertinent positives include: Yeast Buds    Assessment & Plan     Assessment and Plan    Problem List Items Addressed This Visit    None     Visit Diagnoses     Yeast infection    -  Primary    Relevant Medications    fluconazole (Diflucan) 150 MG tablet    Vaginal discharge        Relevant Orders    Chlamydia trachomatis, Neisseria gonorrhoeae, Trichomonas vaginalis, PCR - Swab, Cervix    POC Wet Prep (Completed)    POC KOH Prep (Completed)    Screening for STDs (sexually transmitted diseases)        Relevant Orders    Chlamydia trachomatis, Neisseria gonorrhoeae, Trichomonas vaginalis, PCR - Swab, Cervix          1. Wet prep +yeast, Rx diflucan. STD cultures sent. Encouraged condoms for STD prevention. Call if symptoms worsen.         DEANNA Parsons  06/06/2022

## 2022-06-09 LAB
C TRACH RRNA SPEC QL NAA+PROBE: NEGATIVE
N GONORRHOEA RRNA SPEC QL NAA+PROBE: NEGATIVE
T VAGINALIS RRNA SPEC QL NAA+PROBE: NEGATIVE

## 2023-04-14 PROBLEM — R79.89 ELEVATED PROLACTIN LEVEL: Status: ACTIVE | Noted: 2023-04-14

## 2023-04-17 ENCOUNTER — TELEPHONE (OUTPATIENT)
Dept: URGENT CARE | Facility: CLINIC | Age: 23
End: 2023-04-17
Payer: COMMERCIAL

## 2023-05-17 ENCOUNTER — OFFICE VISIT (OUTPATIENT)
Dept: OBSTETRICS AND GYNECOLOGY | Facility: CLINIC | Age: 23
End: 2023-05-17
Payer: COMMERCIAL

## 2023-05-17 VITALS
BODY MASS INDEX: 27.72 KG/M2 | DIASTOLIC BLOOD PRESSURE: 70 MMHG | HEIGHT: 60 IN | WEIGHT: 141.2 LBS | SYSTOLIC BLOOD PRESSURE: 102 MMHG

## 2023-05-17 DIAGNOSIS — Z30.011 ENCOUNTER FOR INITIAL PRESCRIPTION OF CONTRACEPTIVE PILLS: ICD-10-CM

## 2023-05-17 DIAGNOSIS — Z01.419 ROUTINE GYNECOLOGICAL EXAMINATION: Primary | ICD-10-CM

## 2023-05-17 DIAGNOSIS — Z01.419 PAP TEST, AS PART OF ROUTINE GYNECOLOGICAL EXAMINATION: ICD-10-CM

## 2023-05-17 RX ORDER — DROSPIRENONE AND ETHINYL ESTRADIOL 0.02-3(28)
1 KIT ORAL DAILY
Qty: 28 TABLET | Refills: 11 | Status: SHIPPED | OUTPATIENT
Start: 2023-05-17

## 2023-05-17 NOTE — PROGRESS NOTES
Gynecologic Annual Exam Note        Gynecologic Exam        Subjective     HPI  Madalyn Alexandre is a 23 y.o.  female who presents for annual well woman exam as a established patient. There were no changes to her medical or surgical history since her last visit.. Patient reports problems with: none. Patient's last menstrual period was 2023 (approximate).. Her periods occur every 25-35 days , lasting 3 days. The flow is moderate.. She reports dysmenorrhea is mild, occurring throughout menses. Partner Status: Marital Status: single.  She is sexually active. She has had new partners.. STD testing recommendations have been explained to the patient and she does desire STD testing.    Patient would like to discuss birth control, she has been on the pill in the past and reported that she had mood irregularities. She wants to try another ocp besides [facility] . States she had moodiness and increase acne with that pill.     Additional OB/GYN History   Current contraception: contraceptive methods: Condoms  Desires to: start contraception  Thromboembolic Disease: none  Age of menarche: 6    History of STD: yes GC/CHLAMYDIA    Last Pap : 22. Results: ASCUS. HPV: negative.   Last Completed Pap Smear          Ordered - PAP SMEAR (Every 3 Years) Ordered on 2022  SCANNED - PAP SMEAR    2022  SCANNED - PAP SMEAR    2020  Pap IG, Ct-Ng, Rfx HPV ASCU    2019  Done - Negative, HPV negative                 History of abnormal Pap smear: yes -   Gardasil status:completed  Family history of uterine, colon, breast, or ovarian cancer: no  Performs monthly Self-Breast Exam: yes  Exercises Regularly:yes  Feelings of Anxiety or Depression: no  Tobacco Usage?: No       Current Outpatient Medications:   •  drospirenone-ethinyl estradiol (SHELDON) 3-0.02 MG per tablet, Take 1 tablet by mouth Daily., Disp: 28 tablet, Rfl: 11     Patient denies the need for medication refills  "today.    OB History        0    Para   0    Term   0       0    AB   0    Living   0       SAB   0    IAB   0    Ectopic   0    Molar   0    Multiple   0    Live Births   0                Health Maintenance   Topic Date Due   • HPV VACCINES (1 - 2-dose series) Never done   • TDAP/TD VACCINES (1 - Tdap) Never done   • HEPATITIS C SCREENING  Never done   • ANNUAL PHYSICAL  Never done   • COVID-19 Vaccine (3 - Booster for Moderna series) 2021   • Annual Gynecologic Pelvic and Breast Exam  2023   • INFLUENZA VACCINE  2023   • PAP SMEAR  2025   • Pneumococcal Vaccine 0-64  Aged Out   • CHLAMYDIA SCREENING  Discontinued       Past Medical History:   Diagnosis Date   • Abnormal Pap smear of cervix 22   • Allergic    • Chlamydia 10/06/2020   • Elevated prolactin level    • Elevated prolactin level    • Irregular menses         Past Surgical History:   Procedure Laterality Date   • NO PAST SURGERIES         The additional following portions of the patient's history were reviewed and updated as appropriate: allergies, current medications, past family history, past medical history, past social history, past surgical history and problem list.    Review of Systems   Constitutional: Negative.    Cardiovascular: Negative.    Gastrointestinal: Negative.    Genitourinary: Negative.    Psychiatric/Behavioral: Negative.          I have reviewed and agree with the HPI, ROS, and historical information as entered above. Sarai Jean, APRN        Objective   /70   Ht 152.4 cm (60\")   Wt 64 kg (141 lb 3.2 oz)   LMP 2023 (Approximate)   BMI 27.58 kg/m²     Physical Exam  Vitals and nursing note reviewed. Exam conducted with a chaperone present.   Constitutional:       Appearance: She is well-developed.   HENT:      Head: Normocephalic and atraumatic.   Neck:      Thyroid: No thyroid mass or thyromegaly.   Cardiovascular:      Rate and Rhythm: Normal rate and regular rhythm.      " Heart sounds: No murmur heard.  Pulmonary:      Effort: Pulmonary effort is normal. No retractions.      Breath sounds: Normal breath sounds. No wheezing, rhonchi or rales.   Chest:      Chest wall: No mass or tenderness.   Breasts:     Right: Normal. No mass, nipple discharge, skin change or tenderness.      Left: Normal. No mass, nipple discharge, skin change or tenderness.   Abdominal:      General: Bowel sounds are normal.      Palpations: Abdomen is soft. Abdomen is not rigid. There is no mass.      Tenderness: There is no abdominal tenderness. There is no guarding.      Hernia: No hernia is present.   Genitourinary:     General: Normal vulva.      Labia:         Right: No rash, tenderness or lesion.         Left: No rash, tenderness or lesion.       Vagina: Normal. No vaginal discharge or lesions.      Cervix: No cervical motion tenderness, discharge, lesion or cervical bleeding.      Uterus: Normal. Not enlarged, not fixed and not tender.       Adnexa: Right adnexa normal and left adnexa normal.        Right: No mass or tenderness.          Left: No mass or tenderness.        Rectum: Normal. No external hemorrhoid.   Musculoskeletal:      Cervical back: Normal range of motion. No muscular tenderness.   Neurological:      Mental Status: She is alert and oriented to person, place, and time.   Psychiatric:         Behavior: Behavior normal.            Assessment and Plan    Problem List Items Addressed This Visit    None  Visit Diagnoses     Routine gynecological examination    -  Primary    Pap test, as part of routine gynecological examination        Relevant Orders    LIQUID-BASED PAP SMEAR WITH HPV GENOTYPING REGARDLESS OF INTERPRETATION ([facility])    Encounter for initial prescription of contraceptive pills        Relevant Medications    drospirenone-ethinyl estradiol (SHELDON) 3-0.02 MG per tablet          1. GYN annual well woman exam.   2. Will restart ocps with start of next period.   3. Reviewed pap  guidelines.   4. Reviewed monthly self breast exams.  Instructed to call with lumps, pain, or breast discharge.    5. Reviewed exercise as a preventative health measures.   6. Reccommended Flu Vaccine in Fall of each year.  7. RTC in 1 year or PRN with problems        Sarai Jean, APRN  05/17/2023

## 2023-05-19 LAB — REF LAB TEST METHOD: NORMAL

## 2024-05-23 ENCOUNTER — OFFICE VISIT (OUTPATIENT)
Dept: OBSTETRICS AND GYNECOLOGY | Facility: CLINIC | Age: 24
End: 2024-05-23
Payer: COMMERCIAL

## 2024-05-23 VITALS
HEIGHT: 60 IN | WEIGHT: 151 LBS | SYSTOLIC BLOOD PRESSURE: 102 MMHG | BODY MASS INDEX: 29.64 KG/M2 | DIASTOLIC BLOOD PRESSURE: 70 MMHG

## 2024-05-23 DIAGNOSIS — N92.6 IRREGULAR PERIODS: ICD-10-CM

## 2024-05-23 DIAGNOSIS — Z01.419 WOMEN'S ANNUAL ROUTINE GYNECOLOGICAL EXAMINATION: Primary | ICD-10-CM

## 2024-05-23 NOTE — PROGRESS NOTES
Gynecologic Annual Exam Note        Gynecologic Exam (Annual )        Subjective     HPI  Madalyn Alexandre is a 24 y.o.  female who presents for annual well woman exam as a established patient. There were no changes to her medical or surgical history since her last visit.. Patient's last menstrual period was 2024 (exact date). Her periods occur every 25-35 days, lasting 4 days days.  The flow is light with 1 heavy day. She reports dysmenorrhea is moderate occurring premenstrually and throughout menses. Marital Status: single.  She is sexually active. She has not had new partners.. STD testing recommendations have been explained to the patient and she does not desire STD testing.    The patient would like to discuss the following complaints today: she skipped her period in April but it was a stressful month.  She took OCPs for approx one month after her annual one year ago.  She stopped due to side effect of nausea.  She does not want to restart Rx now.    Additional OB/GYN History   contraceptive methods: Condoms  Desires to: do not start contraception  Thromboembolic Disease: none  History of migraines: no  Age of menarche: 7    History of STD: yes GC/CHLAMYDIA    Last Pap : 2023. Results: negative. HPV: negative.   Last Completed Pap Smear            PAP SMEAR (Every 3 Years) Next due on 2023  LIQUID-BASED PAP SMEAR WITH HPV GENOTYPING REGARDLESS OF INTERPRETATION ([facility])    2022  SCANNED - PAP SMEAR    2022  SCANNED - PAP SMEAR    2020  Pap IG, Ct-Ng, Rfx HPV ASCU    2019  Done - Negative, HPV negative    Only the first 5 history entries have been loaded, but more history exists.                     History of abnormal Pap smear: yes - chlamydia   Gardasil status:in progress  Family history of uterine, colon, breast, or ovarian cancer: no  Performs monthly Self-Breast Exam: yes  Exercises Regularly:yes  Feelings of Anxiety or  "Depression: no  Tobacco Usage?: No     No current outpatient medications on file.     Patient denies the need for medication refills today.    OB History          0    Para   0    Term   0       0    AB   0    Living   0         SAB   0    IAB   0    Ectopic   0    Molar   0    Multiple   0    Live Births   0                Health Maintenance   Topic Date Due    BMI FOLLOWUP  Never done    HPV VACCINES (1 - 3-dose series) Never done    TDAP/TD VACCINES (1 - Tdap) Never done    HEPATITIS C SCREENING  Never done    ANNUAL PHYSICAL  Never done    COVID-19 Vaccine (3 2023-24 season) 2023    Annual Gynecologic Pelvic and Breast Exam  2024    INFLUENZA VACCINE  2024    PAP SMEAR  2026    Pneumococcal Vaccine 0-64  Aged Out    CHLAMYDIA SCREENING  Discontinued       Past Medical History:   Diagnosis Date    Abnormal Pap smear of cervix 22    Chlamydia 10/06/2020    Elevated prolactin level     Irregular menses         Past Surgical History:   Procedure Laterality Date    NO PAST SURGERIES         The additional following portions of the patient's history were reviewed and updated as appropriate: allergies, current medications, past family history, past medical history, past social history, past surgical history, and problem list.    Review of Systems   Constitutional: Negative.    HENT: Negative.     Eyes: Negative.    Respiratory: Negative.     Cardiovascular: Negative.    Gastrointestinal: Negative.    Endocrine: Negative.    Genitourinary: Negative.    Musculoskeletal: Negative.    Skin: Negative.    Allergic/Immunologic: Negative.    Neurological: Negative.    Hematological: Negative.    Psychiatric/Behavioral: Negative.           I have reviewed and agree with the HPI, ROS, and historical information as entered above. Margaux Gallardo, APRN          Objective   /70   Ht 152.4 cm (60\")   Wt 68.5 kg (151 lb)   LMP 2024 (Exact Date)   BMI 29.49 kg/m²     Physical " Exam  Vitals and nursing note reviewed. Exam conducted with a chaperone present.   Constitutional:       General: She is not in acute distress.     Appearance: Normal appearance. She is well-developed. She is not ill-appearing.   Neck:      Thyroid: No thyroid mass or thyromegaly.   Pulmonary:      Effort: Pulmonary effort is normal. No respiratory distress or retractions.   Chest:      Chest wall: No mass.   Breasts:     Right: Normal. No mass, nipple discharge, skin change or tenderness.      Left: Normal. No mass, nipple discharge, skin change or tenderness.   Abdominal:      General: There is no distension.      Palpations: Abdomen is soft. Abdomen is not rigid. There is no mass.      Tenderness: There is no abdominal tenderness. There is no guarding or rebound.      Hernia: No hernia is present. There is no hernia in the left inguinal area.   Genitourinary:     General: Normal vulva.      Labia:         Right: No rash, tenderness or lesion.         Left: No rash, tenderness or lesion.       Vagina: Normal. No vaginal discharge or lesions.      Cervix: Normal.      Uterus: Normal. Not enlarged, not fixed and not tender.       Adnexa: Right adnexa normal and left adnexa normal.        Right: No mass or tenderness.          Left: No mass or tenderness.        Rectum: No external hemorrhoid.   Musculoskeletal:      Cervical back: No muscular tenderness.   Skin:     General: Skin is warm and dry.   Neurological:      Mental Status: She is alert and oriented to person, place, and time.   Psychiatric:         Mood and Affect: Mood normal.         Behavior: Behavior normal.            Assessment and Plan    Problem List Items Addressed This Visit    None  Visit Diagnoses       Women's annual routine gynecological examination    -  Primary    Irregular periods                GYN annual well woman exam.   Reviewed pap guidelines.   Encouraged use of condoms for STD prevention.  Reviewed monthly self breast exams.   Instructed to call with lumps, pain, or breast discharge.    Reviewed exercise as a preventative health measures.   Reccommended Flu Vaccine in Fall of each year.  RTC in 1 year or PRN with problems  She will call if she wants OCPs.    Margaux Gallardo, APRN  05/23/2024

## 2024-09-26 ENCOUNTER — PATIENT ROUNDING (BHMG ONLY) (OUTPATIENT)
Dept: URGENT CARE | Facility: CLINIC | Age: 24
End: 2024-09-26
Payer: COMMERCIAL

## 2024-09-27 ENCOUNTER — APPOINTMENT (OUTPATIENT)
Dept: CT IMAGING | Facility: HOSPITAL | Age: 24
End: 2024-09-27
Payer: COMMERCIAL

## 2024-09-27 ENCOUNTER — HOSPITAL ENCOUNTER (INPATIENT)
Facility: HOSPITAL | Age: 24
LOS: 3 days | Discharge: ANOTHER HOSPITAL | End: 2024-09-30
Attending: EMERGENCY MEDICINE | Admitting: INTERNAL MEDICINE
Payer: COMMERCIAL

## 2024-09-27 ENCOUNTER — APPOINTMENT (OUTPATIENT)
Dept: MRI IMAGING | Facility: HOSPITAL | Age: 24
End: 2024-09-27
Payer: COMMERCIAL

## 2024-09-27 DIAGNOSIS — A41.9 ACUTE SEPSIS: Primary | ICD-10-CM

## 2024-09-27 DIAGNOSIS — R11.2 NAUSEA AND VOMITING, UNSPECIFIED VOMITING TYPE: ICD-10-CM

## 2024-09-27 DIAGNOSIS — M54.50 ACUTE MIDLINE LOW BACK PAIN WITHOUT SCIATICA: ICD-10-CM

## 2024-09-27 DIAGNOSIS — R79.89 ABNORMAL LFTS: ICD-10-CM

## 2024-09-27 DIAGNOSIS — D72.819 LEUKOPENIA, UNSPECIFIED TYPE: ICD-10-CM

## 2024-09-27 PROBLEM — R74.8 ELEVATED LIVER ENZYMES: Status: ACTIVE | Noted: 2024-09-27

## 2024-09-27 LAB
ALBUMIN SERPL-MCNC: 4 G/DL (ref 3.5–5.2)
ALBUMIN/GLOB SERPL: 1.4 G/DL
ALP SERPL-CCNC: 203 U/L (ref 39–117)
ALT SERPL W P-5'-P-CCNC: 614 U/L (ref 1–33)
ANION GAP SERPL CALCULATED.3IONS-SCNC: 10 MMOL/L (ref 5–15)
APAP SERPL-MCNC: 8.5 MCG/ML (ref 0–30)
AST SERPL-CCNC: 614 U/L (ref 1–32)
B PARAPERT DNA SPEC QL NAA+PROBE: NOT DETECTED
B PERT DNA SPEC QL NAA+PROBE: NOT DETECTED
B-HCG UR QL: NEGATIVE
BACTERIA UR QL AUTO: ABNORMAL /HPF
BASOPHILS # BLD AUTO: 0.01 10*3/MM3 (ref 0–0.2)
BASOPHILS NFR BLD AUTO: 0.4 % (ref 0–1.5)
BILIRUB SERPL-MCNC: 0.6 MG/DL (ref 0–1.2)
BILIRUB UR QL STRIP: NEGATIVE
BUN SERPL-MCNC: 6 MG/DL (ref 6–20)
BUN/CREAT SERPL: 7.8 (ref 7–25)
C PNEUM DNA NPH QL NAA+NON-PROBE: NOT DETECTED
CALCIUM SPEC-SCNC: 8.7 MG/DL (ref 8.6–10.5)
CHLORIDE SERPL-SCNC: 102 MMOL/L (ref 98–107)
CLARITY UR: ABNORMAL
CO2 SERPL-SCNC: 24 MMOL/L (ref 22–29)
COLOR UR: ABNORMAL
CREAT SERPL-MCNC: 0.77 MG/DL (ref 0.57–1)
D-LACTATE SERPL-SCNC: 0.8 MMOL/L (ref 0.5–2)
DEPRECATED RDW RBC AUTO: 41.6 FL (ref 37–54)
EGFRCR SERPLBLD CKD-EPI 2021: 109.3 ML/MIN/1.73
EOSINOPHIL # BLD AUTO: 0.01 10*3/MM3 (ref 0–0.4)
EOSINOPHIL NFR BLD AUTO: 0.4 % (ref 0.3–6.2)
ERYTHROCYTE [DISTWIDTH] IN BLOOD BY AUTOMATED COUNT: 12 % (ref 12.3–15.4)
EXPIRATION DATE: NORMAL
FLUAV SUBTYP SPEC NAA+PROBE: NOT DETECTED
FLUAV SUBTYP SPEC NAA+PROBE: NOT DETECTED
FLUBV RNA ISLT QL NAA+PROBE: NOT DETECTED
FLUBV RNA ISLT QL NAA+PROBE: NOT DETECTED
GLOBULIN UR ELPH-MCNC: 2.9 GM/DL
GLUCOSE SERPL-MCNC: 117 MG/DL (ref 65–99)
GLUCOSE UR STRIP-MCNC: NEGATIVE MG/DL
HADV DNA SPEC NAA+PROBE: NOT DETECTED
HAV IGM SERPL QL IA: NORMAL
HBV CORE IGM SERPL QL IA: NORMAL
HBV SURFACE AG SERPL QL IA: NORMAL
HCOV 229E RNA SPEC QL NAA+PROBE: NOT DETECTED
HCOV HKU1 RNA SPEC QL NAA+PROBE: NOT DETECTED
HCOV NL63 RNA SPEC QL NAA+PROBE: NOT DETECTED
HCOV OC43 RNA SPEC QL NAA+PROBE: NOT DETECTED
HCT VFR BLD AUTO: 40.9 % (ref 34–46.6)
HCV AB SER QL: NORMAL
HETEROPH AB SER QL LA: NEGATIVE
HGB BLD-MCNC: 14 G/DL (ref 12–15.9)
HGB UR QL STRIP.AUTO: NEGATIVE
HIV 1+2 AB+HIV1 P24 AG SERPL QL IA: NORMAL
HMPV RNA NPH QL NAA+NON-PROBE: NOT DETECTED
HPIV1 RNA ISLT QL NAA+PROBE: NOT DETECTED
HPIV2 RNA SPEC QL NAA+PROBE: NOT DETECTED
HPIV3 RNA NPH QL NAA+PROBE: NOT DETECTED
HPIV4 P GENE NPH QL NAA+PROBE: NOT DETECTED
HYALINE CASTS UR QL AUTO: ABNORMAL /LPF
IMM GRANULOCYTES # BLD AUTO: 0.01 10*3/MM3 (ref 0–0.05)
IMM GRANULOCYTES NFR BLD AUTO: 0.4 % (ref 0–0.5)
INR PPP: 1.33 (ref 0.89–1.12)
INTERNAL NEGATIVE CONTROL: NEGATIVE
INTERNAL POSITIVE CONTROL: POSITIVE
KETONES UR QL STRIP: ABNORMAL
LEUKOCYTE ESTERASE UR QL STRIP.AUTO: ABNORMAL
LIPASE SERPL-CCNC: 25 U/L (ref 13–60)
LYMPHOCYTES # BLD AUTO: 0.31 10*3/MM3 (ref 0.7–3.1)
LYMPHOCYTES NFR BLD AUTO: 12.2 % (ref 19.6–45.3)
Lab: NORMAL
M PNEUMO IGG SER IA-ACNC: NOT DETECTED
MCH RBC QN AUTO: 31.8 PG (ref 26.6–33)
MCHC RBC AUTO-ENTMCNC: 34.2 G/DL (ref 31.5–35.7)
MCV RBC AUTO: 93 FL (ref 79–97)
MONOCYTES # BLD AUTO: 0.13 10*3/MM3 (ref 0.1–0.9)
MONOCYTES NFR BLD AUTO: 5.1 % (ref 5–12)
NEUTROPHILS NFR BLD AUTO: 2.07 10*3/MM3 (ref 1.7–7)
NEUTROPHILS NFR BLD AUTO: 81.5 % (ref 42.7–76)
NITRITE UR QL STRIP: NEGATIVE
NRBC BLD AUTO-RTO: 0 /100 WBC (ref 0–0.2)
PH UR STRIP.AUTO: 8 [PH] (ref 5–8)
PLATELET # BLD AUTO: 132 10*3/MM3 (ref 140–450)
PMV BLD AUTO: 12 FL (ref 6–12)
POTASSIUM SERPL-SCNC: 3.6 MMOL/L (ref 3.5–5.2)
PROCALCITONIN SERPL-MCNC: 0.69 NG/ML (ref 0–0.25)
PROT SERPL-MCNC: 6.9 G/DL (ref 6–8.5)
PROT UR QL STRIP: ABNORMAL
PROTHROMBIN TIME: 16.6 SECONDS (ref 12.2–14.5)
RBC # BLD AUTO: 4.4 10*6/MM3 (ref 3.77–5.28)
RBC # UR STRIP: ABNORMAL /HPF
REF LAB TEST METHOD: ABNORMAL
RHINOVIRUS RNA SPEC NAA+PROBE: NOT DETECTED
RSV RNA NPH QL NAA+NON-PROBE: NOT DETECTED
SARS-COV-2 RNA NPH QL NAA+NON-PROBE: NOT DETECTED
SARS-COV-2 RNA RESP QL NAA+PROBE: NOT DETECTED
SODIUM SERPL-SCNC: 136 MMOL/L (ref 136–145)
SP GR UR STRIP: 1.02 (ref 1–1.03)
SQUAMOUS #/AREA URNS HPF: ABNORMAL /HPF
UROBILINOGEN UR QL STRIP: ABNORMAL
WBC # UR STRIP: ABNORMAL /HPF
WBC NRBC COR # BLD AUTO: 2.54 10*3/MM3 (ref 3.4–10.8)

## 2024-09-27 PROCEDURE — 87636 SARSCOV2 & INF A&B AMP PRB: CPT | Performed by: EMERGENCY MEDICINE

## 2024-09-27 PROCEDURE — 99285 EMERGENCY DEPT VISIT HI MDM: CPT

## 2024-09-27 PROCEDURE — 99222 1ST HOSP IP/OBS MODERATE 55: CPT | Performed by: FAMILY MEDICINE

## 2024-09-27 PROCEDURE — 25010000002 VANCOMYCIN 1 G RECONSTITUTED SOLUTION 1 EACH VIAL

## 2024-09-27 PROCEDURE — 80143 DRUG ASSAY ACETAMINOPHEN: CPT | Performed by: EMERGENCY MEDICINE

## 2024-09-27 PROCEDURE — 87040 BLOOD CULTURE FOR BACTERIA: CPT | Performed by: EMERGENCY MEDICINE

## 2024-09-27 PROCEDURE — 85025 COMPLETE CBC W/AUTO DIFF WBC: CPT | Performed by: EMERGENCY MEDICINE

## 2024-09-27 PROCEDURE — 83690 ASSAY OF LIPASE: CPT | Performed by: EMERGENCY MEDICINE

## 2024-09-27 PROCEDURE — 93005 ELECTROCARDIOGRAM TRACING: CPT | Performed by: NURSE PRACTITIONER

## 2024-09-27 PROCEDURE — 25510000001 IOPAMIDOL 61 % SOLUTION: Performed by: EMERGENCY MEDICINE

## 2024-09-27 PROCEDURE — 87086 URINE CULTURE/COLONY COUNT: CPT | Performed by: EMERGENCY MEDICINE

## 2024-09-27 PROCEDURE — 36415 COLL VENOUS BLD VENIPUNCTURE: CPT

## 2024-09-27 PROCEDURE — 86664 EPSTEIN-BARR NUCLEAR ANTIGEN: CPT | Performed by: EMERGENCY MEDICINE

## 2024-09-27 PROCEDURE — 86645 CMV ANTIBODY IGM: CPT | Performed by: EMERGENCY MEDICINE

## 2024-09-27 PROCEDURE — 86308 HETEROPHILE ANTIBODY SCREEN: CPT | Performed by: EMERGENCY MEDICINE

## 2024-09-27 PROCEDURE — 25010000002 PIPERACILLIN SOD-TAZOBACTAM PER 1 G: Performed by: EMERGENCY MEDICINE

## 2024-09-27 PROCEDURE — 83605 ASSAY OF LACTIC ACID: CPT | Performed by: EMERGENCY MEDICINE

## 2024-09-27 PROCEDURE — 80053 COMPREHEN METABOLIC PANEL: CPT | Performed by: EMERGENCY MEDICINE

## 2024-09-27 PROCEDURE — A9577 INJ MULTIHANCE: HCPCS | Performed by: EMERGENCY MEDICINE

## 2024-09-27 PROCEDURE — 86665 EPSTEIN-BARR CAPSID VCA: CPT | Performed by: EMERGENCY MEDICINE

## 2024-09-27 PROCEDURE — 80074 ACUTE HEPATITIS PANEL: CPT | Performed by: EMERGENCY MEDICINE

## 2024-09-27 PROCEDURE — 25810000003 SODIUM CHLORIDE 0.9 % SOLUTION: Performed by: NURSE PRACTITIONER

## 2024-09-27 PROCEDURE — 87185 SC STD ENZYME DETCJ PER NZM: CPT | Performed by: EMERGENCY MEDICINE

## 2024-09-27 PROCEDURE — 0202U NFCT DS 22 TRGT SARS-COV-2: CPT | Performed by: FAMILY MEDICINE

## 2024-09-27 PROCEDURE — 25810000003 SODIUM CHLORIDE 0.9 % SOLUTION: Performed by: EMERGENCY MEDICINE

## 2024-09-27 PROCEDURE — 25810000003 SODIUM CHLORIDE 0.9 % SOLUTION 250 ML FLEX CONT

## 2024-09-27 PROCEDURE — G0432 EIA HIV-1/HIV-2 SCREEN: HCPCS | Performed by: EMERGENCY MEDICINE

## 2024-09-27 PROCEDURE — 72158 MRI LUMBAR SPINE W/O & W/DYE: CPT

## 2024-09-27 PROCEDURE — 81025 URINE PREGNANCY TEST: CPT | Performed by: EMERGENCY MEDICINE

## 2024-09-27 PROCEDURE — 0 GADOBENATE DIMEGLUMINE 529 MG/ML SOLUTION: Performed by: EMERGENCY MEDICINE

## 2024-09-27 PROCEDURE — 87641 MR-STAPH DNA AMP PROBE: CPT | Performed by: NURSE PRACTITIONER

## 2024-09-27 PROCEDURE — 87076 CULTURE ANAEROBE IDENT EACH: CPT | Performed by: EMERGENCY MEDICINE

## 2024-09-27 PROCEDURE — 81001 URINALYSIS AUTO W/SCOPE: CPT | Performed by: EMERGENCY MEDICINE

## 2024-09-27 PROCEDURE — 84145 PROCALCITONIN (PCT): CPT | Performed by: EMERGENCY MEDICINE

## 2024-09-27 PROCEDURE — 86644 CMV ANTIBODY: CPT | Performed by: EMERGENCY MEDICINE

## 2024-09-27 PROCEDURE — 74177 CT ABD & PELVIS W/CONTRAST: CPT

## 2024-09-27 PROCEDURE — 85610 PROTHROMBIN TIME: CPT | Performed by: FAMILY MEDICINE

## 2024-09-27 PROCEDURE — 82077 ASSAY SPEC XCP UR&BREATH IA: CPT | Performed by: FAMILY MEDICINE

## 2024-09-27 PROCEDURE — 93010 ELECTROCARDIOGRAM REPORT: CPT | Performed by: INTERNAL MEDICINE

## 2024-09-27 RX ORDER — SODIUM CHLORIDE 0.9 % (FLUSH) 0.9 %
10 SYRINGE (ML) INJECTION AS NEEDED
Status: DISCONTINUED | OUTPATIENT
Start: 2024-09-27 | End: 2024-09-30 | Stop reason: HOSPADM

## 2024-09-27 RX ORDER — ACETAMINOPHEN 500 MG
1000 TABLET ORAL ONCE
Status: COMPLETED | OUTPATIENT
Start: 2024-09-27 | End: 2024-09-27

## 2024-09-27 RX ORDER — ONDANSETRON 2 MG/ML
4 INJECTION INTRAMUSCULAR; INTRAVENOUS EVERY 6 HOURS PRN
Status: DISCONTINUED | OUTPATIENT
Start: 2024-09-27 | End: 2024-09-30 | Stop reason: HOSPADM

## 2024-09-27 RX ORDER — AMOXICILLIN 250 MG
2 CAPSULE ORAL 2 TIMES DAILY PRN
Status: DISCONTINUED | OUTPATIENT
Start: 2024-09-27 | End: 2024-09-30 | Stop reason: HOSPADM

## 2024-09-27 RX ORDER — BISACODYL 10 MG
10 SUPPOSITORY, RECTAL RECTAL DAILY PRN
Status: DISCONTINUED | OUTPATIENT
Start: 2024-09-27 | End: 2024-09-30 | Stop reason: HOSPADM

## 2024-09-27 RX ORDER — SODIUM CHLORIDE 9 MG/ML
100 INJECTION, SOLUTION INTRAVENOUS CONTINUOUS
Status: DISCONTINUED | OUTPATIENT
Start: 2024-09-27 | End: 2024-09-30 | Stop reason: HOSPADM

## 2024-09-27 RX ORDER — IOPAMIDOL 612 MG/ML
97 INJECTION, SOLUTION INTRAVASCULAR
Status: COMPLETED | OUTPATIENT
Start: 2024-09-27 | End: 2024-09-27

## 2024-09-27 RX ORDER — VANCOMYCIN/0.9 % SOD CHLORIDE 1.5G/250ML
22 PLASTIC BAG, INJECTION (ML) INTRAVENOUS ONCE
Status: DISCONTINUED | OUTPATIENT
Start: 2024-09-27 | End: 2024-09-27

## 2024-09-27 RX ORDER — POLYETHYLENE GLYCOL 3350 17 G/17G
17 POWDER, FOR SOLUTION ORAL DAILY PRN
Status: DISCONTINUED | OUTPATIENT
Start: 2024-09-27 | End: 2024-09-30 | Stop reason: HOSPADM

## 2024-09-27 RX ORDER — SODIUM CHLORIDE 9 MG/ML
40 INJECTION, SOLUTION INTRAVENOUS AS NEEDED
Status: DISCONTINUED | OUTPATIENT
Start: 2024-09-27 | End: 2024-09-30 | Stop reason: HOSPADM

## 2024-09-27 RX ORDER — SODIUM CHLORIDE 0.9 % (FLUSH) 0.9 %
10 SYRINGE (ML) INJECTION EVERY 12 HOURS SCHEDULED
Status: DISCONTINUED | OUTPATIENT
Start: 2024-09-27 | End: 2024-09-30 | Stop reason: HOSPADM

## 2024-09-27 RX ORDER — BISACODYL 5 MG/1
5 TABLET, DELAYED RELEASE ORAL DAILY PRN
Status: DISCONTINUED | OUTPATIENT
Start: 2024-09-27 | End: 2024-09-30 | Stop reason: HOSPADM

## 2024-09-27 RX ORDER — ACETAMINOPHEN 325 MG/1
650 TABLET ORAL EVERY 4 HOURS PRN
Status: DISCONTINUED | OUTPATIENT
Start: 2024-09-27 | End: 2024-09-29

## 2024-09-27 RX ADMIN — IOPAMIDOL 97 ML: 612 INJECTION, SOLUTION INTRAVENOUS at 18:17

## 2024-09-27 RX ADMIN — VANCOMYCIN HYDROCHLORIDE 1000 MG: 1 INJECTION, POWDER, LYOPHILIZED, FOR SOLUTION INTRAVENOUS at 22:43

## 2024-09-27 RX ADMIN — SODIUM CHLORIDE 1000 ML: 9 INJECTION, SOLUTION INTRAVENOUS at 18:08

## 2024-09-27 RX ADMIN — GADOBENATE DIMEGLUMINE 15 ML: 529 INJECTION, SOLUTION INTRAVENOUS at 19:23

## 2024-09-27 RX ADMIN — ACETAMINOPHEN 1000 MG: 500 TABLET ORAL at 20:00

## 2024-09-27 RX ADMIN — SODIUM CHLORIDE 100 ML/HR: 9 INJECTION, SOLUTION INTRAVENOUS at 22:45

## 2024-09-27 RX ADMIN — Medication 10 ML: at 22:44

## 2024-09-27 RX ADMIN — PIPERACILLIN AND TAZOBACTAM 3.38 G: 3; .375 INJECTION, POWDER, LYOPHILIZED, FOR SOLUTION INTRAVENOUS; PARENTERAL at 21:15

## 2024-09-27 NOTE — Clinical Note
Level of Care: Telemetry [5]   Diagnosis: Sepsis [[id number]]   Admitting Physician: TOM JIMÉNEZ [[id number]]   Attending Physician: TOM JIMÉNEZ [[id number]]

## 2024-09-27 NOTE — ED PROVIDER NOTES
[place]    EMERGENCY DEPARTMENT ENCOUNTER      Pt Name: Madalyn Alexandre  MRN: [id number]  YOB: 2000  Date of evaluation: 9/27/2024  Provider: Bacilio Espino MD    CHIEF COMPLAINT       Chief Complaint   Patient presents with    Back Pain         HISTORY OF PRESENT ILLNESS   Madalyn Alexandre is a 24 y.o. female who presents to the emergency department with complaint of several days of worsening midline low back pain. She denies having had any other symptoms. She was unaware she had a fever. Specifically, denies headache, cough, chills, abdominal pain, vomiting, diarrhea, urinary symptoms. She denies any vaginal discharge, new sexual partners. Patient is otherwise healthy with no major medical issues.       Nursing notes were reviewed.    REVIEW OF SYSTEMS     ROS:  A chief complaint appropriate review of systems was completed and is negative except as noted in the HPI.      PAST MEDICAL HISTORY     Past Medical History:   Diagnosis Date    Abnormal Pap smear of cervix 4/12/22    Chlamydia 10/06/2020    Elevated prolactin level     Irregular menses          SURGICAL HISTORY       Past Surgical History:   Procedure Laterality Date    NO PAST SURGERIES           CURRENT MEDICATIONS       Current Facility-Administered Medications:     acetaminophen (TYLENOL) tablet 650 mg, 650 mg, Oral, Q4H PRN, Janette Shukla APRN, 650 mg at 09/28/24 0343    sennosides-docusate (PERICOLACE) 8.6-50 MG per tablet 2 tablet, 2 tablet, Oral, BID PRN **AND** polyethylene glycol (MIRALAX) packet 17 g, 17 g, Oral, Daily PRN **AND** bisacodyl (DULCOLAX) EC tablet 5 mg, 5 mg, Oral, Daily PRN **AND** bisacodyl (DULCOLAX) suppository 10 mg, 10 mg, Rectal, Daily PRN, Janette Shukla, APRN    ondansetron (ZOFRAN) injection 4 mg, 4 mg, Intravenous, Q6H PRN, Janette Shukla APRN    Pharmacy to dose vancomycin, , Does not apply, Continuous PRN, Janette Shukla, APRN    piperacillin-tazobactam (ZOSYN) 4.5 g IVPB in 100 mL NS MBP  (CD), 4.5 g, Intravenous, Q8H, Janette Shukla R, APRN, 4.5 g at 09/28/24 0252    sodium chloride 0.9 % flush 10 mL, 10 mL, Intravenous, Q12H, Darlene Shuklay R, APRN, 10 mL at 09/27/24 2244    sodium chloride 0.9 % flush 10 mL, 10 mL, Intravenous, PRN, Shukla, Janette R, APRN    sodium chloride 0.9 % infusion 40 mL, 40 mL, Intravenous, PRN, Shukla, Janette R, APRN    sodium chloride 0.9 % infusion, 100 mL/hr, Intravenous, Continuous, Shukla Janette R, APRN, Last Rate: 100 mL/hr at 09/27/24 2245, 100 mL/hr at 09/27/24 2245    vancomycin (VANCOCIN) 1,000 mg in sodium chloride 0.9 % 250 mL IVPB-VTB, 1,000 mg, Intravenous, Q12H, Jamee Manriquez, [facility]    ALLERGIES     Vancomycin    FAMILY HISTORY       Family History   Problem Relation Age of Onset    No Known Problems Other     Diabetes type II Maternal Grandmother           SOCIAL HISTORY       Social History     Socioeconomic History    Marital status: Single   Tobacco Use    Smoking status: Never    Smokeless tobacco: Never   Vaping Use    Vaping status: Former    Substances: Nicotine, Flavoring    Devices: Refillable tank   Substance and Sexual Activity    Alcohol use: No    Drug use: Not Currently     Types: Marijuana    Sexual activity: Yes     Partners: Male     Birth control/protection: Condom, None     Comment: occasional use         PHYSICAL EXAM    (up to 7 for level 4, 8 or more for level 5)     Vitals:    09/27/24 2154 09/27/24 2305 09/28/24 0325 09/28/24 0700   BP:  97/57 96/67    BP Location:  Right arm Right arm    Patient Position:  Lying Lying    Pulse:  86 111 74   Resp:  18 18    Temp:  99.8 °F (37.7 °C) (!) 100.8 °F (38.2 °C)    TempSrc:  Oral Oral    SpO2:  96% 97% 96%   Weight: 55.2 kg (121 lb 9.6 oz)      Height:           General: Awake, alert, no acute distress.  HEENT: Conjunctivae normal.  Neck: Trachea midline.  Cardiac: Tachycardic, regular rhythm, no murmurs, rubs, or gallops  Lungs: Lungs are clear to auscultation, there is no wheezing,  rhonchi, or rales. There is no use of accessory muscles.  Chest wall: There is no tenderness to palpation over the chest wall or over ribs  Abdomen: Abdomen is soft, nontender, nondistended. There are no firm or pulsatile masses, no rebound rigidity or guarding.   Musculoskeletal: No deformity. Point tenderness present in the lumbar spine.  Neuro: Alert and oriented x 4.  Dermatology: Skin is warm and dry  Psych: Mentation is grossly normal, cognition is grossly normal. Affect is appropriate.        DIAGNOSTIC RESULTS     EKG: All EKGs are interpreted by the Emergency Department Physician who either signs or Co-signs this chart in the absence of a cardiologist.    ECG 12 Lead Tachycardia   Preliminary Result   Test Reason : Tachycardia   Blood Pressure :   */*   mmHG   Vent. Rate :  88 BPM     Atrial Rate :  88 BPM      P-R Int : 156 ms          QRS Dur :  74 ms       QT Int : 368 ms       P-R-T Axes :  24  12   0 degrees      QTc Int : 445 ms      Normal sinus rhythm   Cannot rule out Anterior infarct , age undetermined   Abnormal ECG   No previous ECGs available      Referred By:            Confirmed By:             RADIOLOGY:   [x] Radiologist's Report Reviewed:  MRI Lumbar Spine With & Without Contrast   Final Result   Impression:      1. Mild degenerative facet change at the L4/5 and L5/S1 levels. There is no focal disc protrusion, spinal canal stenosis, or neural foraminal narrowing identified.            Electronically Signed: Sal Pacheco MD     9/27/2024 7:42 PM EDT     Workstation ID: [id number]      CT Abdomen Pelvis With Contrast   Final Result   Impression:      Small to moderate volume free fluid in the pelvis. Finding may be physiologic and possibly secondary to recently ruptured ovarian cyst however, in the setting of fever or signs of sepsis, underlying infectious process not excluded. Clinically correlate.      Otherwise, unremarkable contrast-enhanced CT of the abdomen and pelvis.             Electronically Signed: Albert Chau MD     9/27/2024 6:32 PM EDT     Workstation ID: [id number]      US Liver    (Results Pending)       I ordered and independently reviewed the above noted radiographic studies.        LABS:    I have reviewed and interpreted all of the currently available lab results from this visit (if applicable):  Results for orders placed or performed during the hospital encounter of 09/27/24   COVID-19 and FLU A/B PCR, 1 HR TAT - Swab, Nasopharynx    Specimen: Nasopharynx; Swab   Result Value Ref Range    COVID19 Not Detected Not Detected - Ref. Range    Influenza A PCR Not Detected Not Detected    Influenza B PCR Not Detected Not Detected   Respiratory Panel PCR w/COVID-19(SARS-CoV-2) ALBERTO/ALIN/HAKAN/PAD/COR/RASHI In-House, NP Swab in UTM/VTM, 2 HR TAT - Swab, Nasopharynx    Specimen: Nasopharynx; Swab   Result Value Ref Range    ADENOVIRUS, PCR Not Detected Not Detected    Coronavirus 229E Not Detected Not Detected    Coronavirus HKU1 Not Detected Not Detected    Coronavirus NL63 Not Detected Not Detected    Coronavirus OC43 Not Detected Not Detected    COVID19 Not Detected Not Detected - Ref. Range    Human Metapneumovirus Not Detected Not Detected    Human Rhinovirus/Enterovirus Not Detected Not Detected    Influenza A PCR Not Detected Not Detected    Influenza B PCR Not Detected Not Detected    Parainfluenza Virus 1 Not Detected Not Detected    Parainfluenza Virus 2 Not Detected Not Detected    Parainfluenza Virus 3 Not Detected Not Detected    Parainfluenza Virus 4 Not Detected Not Detected    RSV, PCR Not Detected Not Detected    Bordetella pertussis pcr Not Detected Not Detected    Bordetella parapertussis PCR Not Detected Not Detected    Chlamydophila pneumoniae PCR Not Detected Not Detected    Mycoplasma pneumo by PCR Not Detected Not Detected   Comprehensive Metabolic Panel    Specimen: Blood   Result Value Ref Range    Glucose 117 (H) 65 - 99 mg/dL    BUN 6 6 - 20 mg/dL    Creatinine  0.77 0.57 - 1.00 mg/dL    Sodium 136 136 - 145 mmol/L    Potassium 3.6 3.5 - 5.2 mmol/L    Chloride 102 98 - 107 mmol/L    CO2 24.0 22.0 - 29.0 mmol/L    Calcium 8.7 8.6 - 10.5 mg/dL    Total Protein 6.9 6.0 - 8.5 g/dL    Albumin 4.0 3.5 - 5.2 g/dL    ALT (SGPT) 614 (H) 1 - 33 U/L    AST (SGOT) 614 (H) 1 - 32 U/L    Alkaline Phosphatase 203 (H) 39 - 117 U/L    Total Bilirubin 0.6 0.0 - 1.2 mg/dL    Globulin 2.9 gm/dL    A/G Ratio 1.4 g/dL    BUN/Creatinine Ratio 7.8 7.0 - 25.0    Anion Gap 10.0 5.0 - 15.0 mmol/L    eGFR 109.3 >60.0 mL/min/1.73   Lipase    Specimen: Blood   Result Value Ref Range    Lipase 25 13 - 60 U/L   Urinalysis With Culture If Indicated - Urine, Clean Catch    Specimen: Urine, Clean Catch   Result Value Ref Range    Color, UA Dark Yellow (A) Yellow, Straw    Appearance, UA Cloudy (A) Clear    pH, UA 8.0 5.0 - 8.0    Specific Gravity, UA 1.019 1.001 - 1.030    Glucose, UA Negative Negative    Ketones, UA 15 mg/dL (1+) (A) Negative    Bilirubin, UA Negative Negative    Blood, UA Negative Negative    Protein,  mg/dL (2+) (A) Negative    Leuk Esterase, UA Trace (A) Negative    Nitrite, UA Negative Negative    Urobilinogen, UA 1.0 E.U./dL 0.2 - 1.0 E.U./dL   Lactic Acid, Plasma    Specimen: Blood   Result Value Ref Range    Lactate 0.8 0.5 - 2.0 mmol/L   Procalcitonin    Specimen: Blood   Result Value Ref Range    Procalcitonin 0.69 (H) 0.00 - 0.25 ng/mL   CBC Auto Differential    Specimen: Blood   Result Value Ref Range    WBC 2.54 (L) 3.40 - 10.80 10*3/mm3    RBC 4.40 3.77 - 5.28 10*6/mm3    Hemoglobin 14.0 12.0 - 15.9 g/dL    Hematocrit 40.9 34.0 - 46.6 %    MCV 93.0 79.0 - 97.0 fL    MCH 31.8 26.6 - 33.0 pg    MCHC 34.2 31.5 - 35.7 g/dL    RDW 12.0 (L) 12.3 - 15.4 %    RDW-SD 41.6 37.0 - 54.0 fl    MPV 12.0 6.0 - 12.0 fL    Platelets 132 (L) 140 - 450 10*3/mm3    Neutrophil % 81.5 (H) 42.7 - 76.0 %    Lymphocyte % 12.2 (L) 19.6 - 45.3 %    Monocyte % 5.1 5.0 - 12.0 %    Eosinophil % 0.4  0.3 - 6.2 %    Basophil % 0.4 0.0 - 1.5 %    Immature Grans % 0.4 0.0 - 0.5 %    Neutrophils, Absolute 2.07 1.70 - 7.00 10*3/mm3    Lymphocytes, Absolute 0.31 (L) 0.70 - 3.10 10*3/mm3    Monocytes, Absolute 0.13 0.10 - 0.90 10*3/mm3    Eosinophils, Absolute 0.01 0.00 - 0.40 10*3/mm3    Basophils, Absolute 0.01 0.00 - 0.20 10*3/mm3    Immature Grans, Absolute 0.01 0.00 - 0.05 10*3/mm3    nRBC 0.0 0.0 - 0.2 /100 WBC   Urinalysis, Microscopic Only - Urine, Clean Catch    Specimen: Urine, Clean Catch   Result Value Ref Range    RBC, UA 3-5 (A) None Seen, 0-2 /HPF    WBC, UA 0-2 None Seen, 0-2 /HPF    Bacteria, UA None Seen None Seen, Trace /HPF    Squamous Epithelial Cells, UA 7-12 (A) None Seen, 0-2 /HPF    Hyaline Casts, UA 0-6 0 - 6 /LPF    Methodology Automated Microscopy    Hepatitis Panel, Acute    Specimen: Blood   Result Value Ref Range    Hepatitis B Surface Ag Non-Reactive Non-Reactive    Hep A IgM Non-Reactive Non-Reactive    Hep B C IgM Non-Reactive Non-Reactive    Hepatitis C Ab Non-Reactive Non-Reactive   Acetaminophen Level    Specimen: Blood   Result Value Ref Range    Acetaminophen 8.5 0.0 - 30.0 mcg/mL   HIV-1 / O / 2 Ag / Antibody    Specimen: Blood   Result Value Ref Range    HIV-1/ HIV-2 Non-Reactive Non-Reactive   Mononucleosis Screen    Specimen: Blood   Result Value Ref Range    Monospot Negative Negative   Protime-INR    Specimen: Blood   Result Value Ref Range    Protime 16.6 (H) 12.2 - 14.5 Seconds    INR 1.33 (H) 0.89 - 1.12   Ethanol    Specimen: Blood   Result Value Ref Range    Ethanol <10 0 - 10 mg/dL   Ammonia    Specimen: Blood   Result Value Ref Range    Ammonia 10 (L) 11 - 51 umol/L   POC Urine Pregnancy    Specimen: Urine   Result Value Ref Range    HCG, Urine, QL Negative Negative    Lot Number [id number]     Internal Positive Control Positive Positive, Passed    Internal Negative Control Negative Negative, Passed    Expiration Date 01/28/2025    ECG 12 Lead Tachycardia   Result Value  Ref Range    QT Interval 368 ms    QTC Interval 445 ms        If labs were ordered, I independently reviewed the results and considered them in treating the patient.      EMERGENCY DEPARTMENT COURSE and DIFFERENTIAL DIAGNOSIS/MDM:   Vitals:  AS OF 07:44 EDT    BP - 96/67  HR - 74  TEMP - (!) 100.8 °F (38.2 °C) (Oral)  O2 SATS - 96%        Discussion below represents my analysis of pertinent findings related to patient's condition, differential diagnosis, treatment plan and final disposition.      Differential diagnosis:  The differential diagnosis associated with the patient's presentation includes: cystitis, pyelo, cervicitis, PID, TOA, discitis, epidural abscess, covid, flu      Independent interpretations (ECG/rhythm strip/X-ray/US/CT scan): I independently interpreted the pt abd Ct and cardiac monitor - no evidence of hepatic pathology and the patient is in sinus tach.      Additional sources:  Discussed/obtained information from independent historians:   [] Spouse:   [] Parent:   [] Friend:   [] EMS:   [] Other:  External (non-ED) record review:   [] Inpatient record:   [] Office record:   [] Outpatient record:   [] Prior Outpatient labs:   [] Prior Outpatient radiology:   [] Primary Care record:   [] Outside ED record:   [x] Other: Reviewed previous urgent care record - patient dx and tx for MSK low back pain      Patient's care impacted by:   [] Diabetes   [] Hypertension   [] Coronary Artery Disease   [] Cancer   [x] Other: Hx of chlamydia    Care significantly affected by Social Determinants of Health (housing and economic circumstances, unemployment)    [] Yes     [x] No   If yes, Patient's care significantly limited by  Social Determinants of Health including:    [] Inadequate housing    [] Low income    [] Alcoholism and drug addiction in family    [] Problems related to primary support group    [] Unemployment    [] Problems related to employment    [] Other Social Determinants of Health:        Consideration of admission/observation vs discharge: Patient presents with findings concerning for sepsis of unknown origin - needs admission for further evaluation      I considered prescription management with:    [] Pain medication:   [] Antiviral:   [x] Antibiotic: Patient empirically started on Vanc, Zosyn   [] Other:    ED Course:    ED Course as of 09/28/24 0744   Fri Sep 27, 2024   2013 Pelvic exam performed with patient nurse Stephanie Addison at bedside.  Scant amount of white discharge.  No cervical motion tenderness.  Cervix is normal in appearance without any evidence of purulent discharge, friability, erythema, or significant tenderness. [NS]   2033 Patient presents with infection of unknown origin. Only complaint is low back pain and vomiting for the past week. Had some point tenderness in the lumbar spine but no abdominal tenderness. She has had no respiratory symptoms. Febrile to 102.5 on arrival, tachycardic to 120, leukopenic with white count of 2.5, slightly elevated procalcitonin, significantly elevated transaminases but with normal bilirubin. Urine looks fine. Obtained an abdominal CT that showed small to moderate amount of pelvic free fluid but nothing else acute. Given point tenderness in the lumbar spine, obtained MRI which was normal. She has a history of chlamydia and so performed a pelvic exam which was normal. No abnormal discharge, cervix looked fine, no cervical motion tenderness. Have added on testing for HIV, EBV, CMV. She has gotten fluids and ordered a dose of broad-spectrum antibiotics. [NS]   2033 I discussed case with hospitalist Dr. Munguia.  Discussed history, presentation, workup.  Accept patient for admission. [NS]      ED Course User Index  [NS] Bacilio Espino MD         PROCEDURES:  Procedures    CRITICAL CARE TIME    Approximately 35 minutes of discontinuous critical care time was provided to this patient by myself absent of any time spent performing procedures.   Patient presents critically ill with acute sepsis of unknown origin placing the CV, resp, neuro, renal systems at risk requiring the following interventions: IV fluid resus, broad spectrum IV abx, interpretation of labs/ecg/imaging, frequent reassessment, coordination of admission.  Patient at high risk of deterioration and possibly death without these interventions.      FINAL IMPRESSION      1. Acute sepsis    2. Acute midline low back pain without sciatica    3. Nausea and vomiting, unspecified vomiting type    4. Leukopenia, unspecified type    5. Abnormal LFTs          DISPOSITION/PLAN     ED Disposition       ED Disposition   Decision to Admit    Condition   --    Comment   Level of Care: Telemetry [5]   Diagnosis: Sepsis [[id number]]   Admitting Physician: TOM JIMÉNEZ [[id number]]   Attending Physician: TOM JIMÉNEZ [[id number]]   Certification: I Certify That Inpatient Hospital Services Are Medically Necessary For Greater Than 2 Midnights                   Comment: Please note this report has been produced using speech recognition software.      Bacilio Espino MD  Attending Emergency Physician             Bacilio Espino MD  09/28/24 1727

## 2024-09-28 ENCOUNTER — APPOINTMENT (OUTPATIENT)
Dept: ULTRASOUND IMAGING | Facility: HOSPITAL | Age: 24
End: 2024-09-28
Payer: COMMERCIAL

## 2024-09-28 LAB
ALBUMIN SERPL-MCNC: 3.6 G/DL (ref 3.5–5.2)
ALBUMIN/GLOB SERPL: 1.4 G/DL
ALP SERPL-CCNC: 178 U/L (ref 39–117)
ALT SERPL W P-5'-P-CCNC: 917 U/L (ref 1–33)
AMMONIA BLD-SCNC: 10 UMOL/L (ref 11–51)
AMPHET+METHAMPHET UR QL: NEGATIVE
AMPHETAMINES UR QL: NEGATIVE
ANION GAP SERPL CALCULATED.3IONS-SCNC: 11 MMOL/L (ref 5–15)
AST SERPL-CCNC: 869 U/L (ref 1–32)
B-HCG UR QL: NEGATIVE
BARBITURATES UR QL SCN: NEGATIVE
BASOPHILS # BLD MANUAL: 0 10*3/MM3 (ref 0–0.2)
BASOPHILS NFR BLD MANUAL: 0 % (ref 0–1.5)
BENZODIAZ UR QL SCN: NEGATIVE
BILIRUB SERPL-MCNC: 1 MG/DL (ref 0–1.2)
BUN SERPL-MCNC: 5 MG/DL (ref 6–20)
BUN/CREAT SERPL: 6.9 (ref 7–25)
BUPRENORPHINE SERPL-MCNC: NEGATIVE NG/ML
CALCIUM SPEC-SCNC: 8.3 MG/DL (ref 8.6–10.5)
CANNABINOIDS SERPL QL: NEGATIVE
CERULOPLASMIN SERPL-MCNC: 24 MG/DL (ref 19–39)
CHLORIDE SERPL-SCNC: 103 MMOL/L (ref 98–107)
CK SERPL-CCNC: 57 U/L (ref 20–180)
CO2 SERPL-SCNC: 23 MMOL/L (ref 22–29)
COCAINE UR QL: NEGATIVE
CREAT SERPL-MCNC: 0.72 MG/DL (ref 0.57–1)
DEPRECATED RDW RBC AUTO: 42 FL (ref 37–54)
EGFRCR SERPLBLD CKD-EPI 2021: 118.4 ML/MIN/1.73
EOSINOPHIL # BLD MANUAL: 0 10*3/MM3 (ref 0–0.4)
EOSINOPHIL NFR BLD MANUAL: 0 % (ref 0.3–6.2)
ERYTHROCYTE [DISTWIDTH] IN BLOOD BY AUTOMATED COUNT: 12.1 % (ref 12.3–15.4)
ETHANOL BLD-MCNC: <10 MG/DL (ref 0–10)
FENTANYL UR-MCNC: NEGATIVE NG/ML
GLOBULIN UR ELPH-MCNC: 2.5 GM/DL
GLUCOSE SERPL-MCNC: 77 MG/DL (ref 65–99)
HCT VFR BLD AUTO: 39.7 % (ref 34–46.6)
HGB BLD-MCNC: 13.5 G/DL (ref 12–15.9)
LYMPHOCYTES # BLD MANUAL: 0.4 10*3/MM3 (ref 0.7–3.1)
LYMPHOCYTES NFR BLD MANUAL: 3 % (ref 5–12)
MAGNESIUM SERPL-MCNC: 2.4 MG/DL (ref 1.6–2.6)
MCH RBC QN AUTO: 31.7 PG (ref 26.6–33)
MCHC RBC AUTO-ENTMCNC: 34 G/DL (ref 31.5–35.7)
MCV RBC AUTO: 93.2 FL (ref 79–97)
METHADONE UR QL SCN: NEGATIVE
MONOCYTES # BLD: 0.09 10*3/MM3 (ref 0.1–0.9)
MRSA DNA SPEC QL NAA+PROBE: NEGATIVE
NEUTROPHILS # BLD AUTO: 2.4 10*3/MM3 (ref 1.7–7)
NEUTROPHILS NFR BLD MANUAL: 51 % (ref 42.7–76)
NEUTS BAND NFR BLD MANUAL: 32 % (ref 0–5)
NRBC SPEC MANUAL: 0 /100 WBC (ref 0–0.2)
OPIATES UR QL: NEGATIVE
OXYCODONE UR QL SCN: NEGATIVE
PCP UR QL SCN: NEGATIVE
PLAT MORPH BLD: NORMAL
PLATELET # BLD AUTO: 123 10*3/MM3 (ref 140–450)
PMV BLD AUTO: 11.8 FL (ref 6–12)
POTASSIUM SERPL-SCNC: 3.7 MMOL/L (ref 3.5–5.2)
PROCALCITONIN SERPL-MCNC: 0.96 NG/ML (ref 0–0.25)
PROT SERPL-MCNC: 6.1 G/DL (ref 6–8.5)
QT INTERVAL: 368 MS
QTC INTERVAL: 445 MS
RBC # BLD AUTO: 4.26 10*6/MM3 (ref 3.77–5.28)
RBC MORPH BLD: NORMAL
SODIUM SERPL-SCNC: 137 MMOL/L (ref 136–145)
TRICYCLICS UR QL SCN: NEGATIVE
VARIANT LYMPHS NFR BLD MANUAL: 14 % (ref 19.6–45.3)
WBC MORPH BLD: NORMAL
WBC NRBC COR # BLD AUTO: 2.89 10*3/MM3 (ref 3.4–10.8)

## 2024-09-28 PROCEDURE — 25010000002 DIPHENHYDRAMINE PER 50 MG: Performed by: INTERNAL MEDICINE

## 2024-09-28 PROCEDURE — 87468 ANAPLSMA PHGCYTOPHLM AMP PRB: CPT | Performed by: INTERNAL MEDICINE

## 2024-09-28 PROCEDURE — 25010000002 ONDANSETRON PER 1 MG: Performed by: NURSE PRACTITIONER

## 2024-09-28 PROCEDURE — 99221 1ST HOSP IP/OBS SF/LOW 40: CPT | Performed by: OBSTETRICS & GYNECOLOGY

## 2024-09-28 PROCEDURE — 86235 NUCLEAR ANTIGEN ANTIBODY: CPT | Performed by: INTERNAL MEDICINE

## 2024-09-28 PROCEDURE — 82390 ASSAY OF CERULOPLASMIN: CPT | Performed by: INTERNAL MEDICINE

## 2024-09-28 PROCEDURE — 25010000002 VANCOMYCIN 1 G RECONSTITUTED SOLUTION 1 EACH VIAL

## 2024-09-28 PROCEDURE — 76705 ECHO EXAM OF ABDOMEN: CPT

## 2024-09-28 PROCEDURE — 86015 ACTIN ANTIBODY EACH: CPT | Performed by: INTERNAL MEDICINE

## 2024-09-28 PROCEDURE — 83735 ASSAY OF MAGNESIUM: CPT | Performed by: NURSE PRACTITIONER

## 2024-09-28 PROCEDURE — 25810000003 SODIUM CHLORIDE 0.9 % SOLUTION 250 ML FLEX CONT

## 2024-09-28 PROCEDURE — 84145 PROCALCITONIN (PCT): CPT | Performed by: FAMILY MEDICINE

## 2024-09-28 PROCEDURE — 82140 ASSAY OF AMMONIA: CPT | Performed by: FAMILY MEDICINE

## 2024-09-28 PROCEDURE — 86381 MITOCHONDRIAL ANTIBODY EACH: CPT | Performed by: INTERNAL MEDICINE

## 2024-09-28 PROCEDURE — 86618 LYME DISEASE ANTIBODY: CPT | Performed by: INTERNAL MEDICINE

## 2024-09-28 PROCEDURE — 80053 COMPREHEN METABOLIC PANEL: CPT | Performed by: NURSE PRACTITIONER

## 2024-09-28 PROCEDURE — 25010000002 DIPHENHYDRAMINE PER 50 MG: Performed by: PHYSICIAN ASSISTANT

## 2024-09-28 PROCEDURE — 82550 ASSAY OF CK (CPK): CPT | Performed by: INTERNAL MEDICINE

## 2024-09-28 PROCEDURE — 87591 N.GONORRHOEAE DNA AMP PROB: CPT | Performed by: INTERNAL MEDICINE

## 2024-09-28 PROCEDURE — 25010000002 PIPERACILLIN SOD-TAZOBACTAM PER 1 G: Performed by: NURSE PRACTITIONER

## 2024-09-28 PROCEDURE — 80307 DRUG TEST PRSMV CHEM ANLYZR: CPT | Performed by: INTERNAL MEDICINE

## 2024-09-28 PROCEDURE — 84165 PROTEIN E-PHORESIS SERUM: CPT | Performed by: INTERNAL MEDICINE

## 2024-09-28 PROCEDURE — 86225 DNA ANTIBODY NATIVE: CPT | Performed by: INTERNAL MEDICINE

## 2024-09-28 PROCEDURE — 85007 BL SMEAR W/DIFF WBC COUNT: CPT | Performed by: NURSE PRACTITIONER

## 2024-09-28 PROCEDURE — 87798 DETECT AGENT NOS DNA AMP: CPT | Performed by: INTERNAL MEDICINE

## 2024-09-28 PROCEDURE — 87491 CHLMYD TRACH DNA AMP PROBE: CPT | Performed by: INTERNAL MEDICINE

## 2024-09-28 PROCEDURE — 81025 URINE PREGNANCY TEST: CPT | Performed by: PHYSICIAN ASSISTANT

## 2024-09-28 PROCEDURE — 99222 1ST HOSP IP/OBS MODERATE 55: CPT | Performed by: INTERNAL MEDICINE

## 2024-09-28 PROCEDURE — 99232 SBSQ HOSP IP/OBS MODERATE 35: CPT | Performed by: INTERNAL MEDICINE

## 2024-09-28 PROCEDURE — 85025 COMPLETE CBC W/AUTO DIFF WBC: CPT | Performed by: NURSE PRACTITIONER

## 2024-09-28 PROCEDURE — 87661 TRICHOMONAS VAGINALIS AMPLIF: CPT | Performed by: INTERNAL MEDICINE

## 2024-09-28 PROCEDURE — 87484 EHRLICHA CHAFFEENSIS AMP PRB: CPT | Performed by: INTERNAL MEDICINE

## 2024-09-28 RX ORDER — DIPHENHYDRAMINE HYDROCHLORIDE 50 MG/ML
25 INJECTION INTRAMUSCULAR; INTRAVENOUS ONCE
Status: COMPLETED | OUTPATIENT
Start: 2024-09-28 | End: 2024-09-28

## 2024-09-28 RX ADMIN — VANCOMYCIN HYDROCHLORIDE 1000 MG: 1 INJECTION, POWDER, LYOPHILIZED, FOR SOLUTION INTRAVENOUS at 10:43

## 2024-09-28 RX ADMIN — PIPERACILLIN AND TAZOBACTAM 4.5 G: 4; .5 INJECTION, POWDER, FOR SOLUTION INTRAVENOUS at 11:57

## 2024-09-28 RX ADMIN — ACETAMINOPHEN 650 MG: 325 TABLET ORAL at 17:06

## 2024-09-28 RX ADMIN — ACETAMINOPHEN 650 MG: 325 TABLET ORAL at 03:43

## 2024-09-28 RX ADMIN — DOXYCYCLINE 100 MG: 100 INJECTION, POWDER, LYOPHILIZED, FOR SOLUTION INTRAVENOUS at 17:06

## 2024-09-28 RX ADMIN — PIPERACILLIN AND TAZOBACTAM 4.5 G: 4; .5 INJECTION, POWDER, FOR SOLUTION INTRAVENOUS at 02:52

## 2024-09-28 RX ADMIN — ONDANSETRON 4 MG: 2 INJECTION INTRAMUSCULAR; INTRAVENOUS at 17:07

## 2024-09-28 RX ADMIN — ACETAMINOPHEN 650 MG: 325 TABLET ORAL at 11:56

## 2024-09-28 RX ADMIN — ONDANSETRON 4 MG: 2 INJECTION INTRAMUSCULAR; INTRAVENOUS at 10:43

## 2024-09-28 RX ADMIN — DIPHENHYDRAMINE HYDROCHLORIDE 25 MG: 50 INJECTION INTRAMUSCULAR; INTRAVENOUS at 00:26

## 2024-09-28 RX ADMIN — DIPHENHYDRAMINE HYDROCHLORIDE 25 MG: 50 INJECTION INTRAMUSCULAR; INTRAVENOUS at 11:57

## 2024-09-28 NOTE — PLAN OF CARE
Goal Outcome Evaluation:  Plan of Care Reviewed With: patient        Progress: improving  Outcome Evaluation: VSS on RA. AOx4. Temp elevated and releived with tylenol. GI, ID, and gyno consulted. ABx infusing but slowly due to intolerance at normal infusion rate. Complaints of pain and nausea relieved with PRN meds. Discussed plan of care with patient who verbalizes understanding.

## 2024-09-28 NOTE — PROGRESS NOTES
[facility]  PROGRESS NOTE    Patient Name: Madalyn Alexandre  : 2000  MRN: [id number]    Date of Admission: 2024  Primary Care Physician: Provider, No Known    Subjective   Subjective     CC: fever    HPI:  Feeling pretty nauseated this am, febrile overnight but feeling better now.     Objective   Objective     Vital Signs:   Temp:  [99.8 °F (37.7 °C)-102.5 °F (39.2 °C)] 100.8 °F (38.2 °C)  Heart Rate:  [] 111  Resp:  [18] 18  BP: ()/(57-83) 96/67     Physical Exam:  Constitutional: No acute distress, awake, alert  HENT: NCAT, mucous membranes moist  Respiratory: Clear to auscultation bilaterally, respiratory effort normal   Cardiovascular: RRR, no murmurs, rubs, or gallops  Gastrointestinal: Positive bowel sounds, soft, nontender, nondistended  Musculoskeletal: No bilateral ankle edema  Psychiatric: Appropriate affect, cooperative  Neurologic: Oriented x 3, strength symmetric in all extremities, Cranial Nerves grossly intact to confrontation, speech clear  Skin: No rashes   Results Reviewed:  LAB RESULTS:      Lab 24  1801   WBC  --  2.54*   HEMOGLOBIN  --  14.0   HEMATOCRIT  --  40.9   PLATELETS  --  132*   NEUTROS ABS  --  2.07   IMMATURE GRANS (ABS)  --  0.01   LYMPHS ABS  --  0.31*   MONOS ABS  --  0.13   EOS ABS  --  0.01   MCV  --  93.0   PROCALCITONIN  --  0.69*   LACTATE  --  0.8   PROTIME 16.6*  --          Lab 24  1801   SODIUM 136   POTASSIUM 3.6   CHLORIDE 102   CO2 24.0   ANION GAP 10.0   BUN 6   CREATININE 0.77   EGFR 109.3   GLUCOSE 117*   CALCIUM 8.7         Lab 24  1801   TOTAL PROTEIN 6.9   ALBUMIN 4.0   GLOBULIN 2.9   ALT (SGPT) 614*   AST (SGOT) 614*   BILIRUBIN 0.6   ALK PHOS 203*   LIPASE 25         Lab 24   PROTIME 16.6*   INR 1.33*                 Brief Urine Lab Results  (Last result in the past 365 days)        Color   Clarity   Blood   Leuk Est   Nitrite   Protein   CREAT    Urine HCG        09/27/24 1733               Negative               Microbiology Results Abnormal       Procedure Component Value - Date/Time    Respiratory Panel PCR w/COVID-19(SARS-CoV-2) ALBERTO/ALIN/HAKAN/PAD/COR/RASHI In-House, NP Swab in UTM/VTM, 2 HR TAT - Swab, Nasopharynx [[id number]]  (Normal) Collected: 09/27/24 1728    Lab Status: Final result Specimen: Swab from Nasopharynx Updated: 09/27/24 2200     ADENOVIRUS, PCR Not Detected     Coronavirus 229E Not Detected     Coronavirus HKU1 Not Detected     Coronavirus NL63 Not Detected     Coronavirus OC43 Not Detected     COVID19 Not Detected     Human Metapneumovirus Not Detected     Human Rhinovirus/Enterovirus Not Detected     Influenza A PCR Not Detected     Influenza B PCR Not Detected     Parainfluenza Virus 1 Not Detected     Parainfluenza Virus 2 Not Detected     Parainfluenza Virus 3 Not Detected     Parainfluenza Virus 4 Not Detected     RSV, PCR Not Detected     Bordetella pertussis pcr Not Detected     Bordetella parapertussis PCR Not Detected     Chlamydophila pneumoniae PCR Not Detected     Mycoplasma pneumo by PCR Not Detected    Narrative:      In the setting of a positive respiratory panel with a viral infection PLUS a negative procalcitonin without other underlying concern for bacterial infection, consider observing off antibiotics or discontinuation of antibiotics and continue supportive care. If the respiratory panel is positive for atypical bacterial infection (Bordetella pertussis, Chlamydophila pneumoniae, or Mycoplasma pneumoniae), consider antibiotic de-escalation to target atypical bacterial infection.    COVID PRE-OP / PRE-PROCEDURE SCREENING ORDER (NO ISOLATION) - Swab, Nasopharynx [[id number]]  (Normal) Collected: 09/27/24 1728    Lab Status: Final result Specimen: Swab from Nasopharynx Updated: 09/27/24 1847    Narrative:      The following orders were created for panel order COVID PRE-OP / PRE-PROCEDURE SCREENING ORDER (NO ISOLATION) -  Swab, Nasopharynx.  Procedure                               Abnormality         Status                     ---------                               -----------         ------                     COVID-19 and FLU A/B PCR...[[id number]]  Normal              Final result                 Please view results for these tests on the individual orders.    COVID-19 and FLU A/B PCR, 1 HR TAT - Swab, Nasopharynx [[id number]]  (Normal) Collected: 09/27/24 1728    Lab Status: Final result Specimen: Swab from Nasopharynx Updated: 09/27/24 1847     COVID19 Not Detected     Influenza A PCR Not Detected     Influenza B PCR Not Detected    Narrative:      Fact sheet for providers: https://www.fda.gov/media/003629/download    Fact sheet for patients: https://www.fda.gov/media/350238/download    Test performed by PCR.            MRI Lumbar Spine With & Without Contrast    Result Date: 9/27/2024  MRI LUMBAR SPINE W WO CONTRAST Date of Exam: 9/27/2024 6:43 PM EDT Indication: sepsis, midline lumbar spine pain, eval for infectious process.  Comparison: None available. Technique:  Routine multiplanar/multisequence sequence images of the lumbar spine were obtained before and after the uneventful administration of Multihance.  Findings: There is normal height, alignment, and signal intensity of the lumbar vertebral bodies. Spinal cord terminates at the L1 level with normal signal seen within the conus. Visualized paraspinal soft tissues appear within normal limits. Postcontrast images demonstrate no pathologic contrast enhancement. Axial images demonstrate: L1/2: No significant disc bulge. Facet joints are within normal limits. No spinal canal stenosis or neural foraminal narrowing. L2/3: No significant disc bulge. Facet joints appear within normal limits. No spinal canal stenosis or neural foraminal narrowing. L3/4: No significant disc bulge. Facet joints are within normal limits. No spinal canal stenosis or neural foraminal narrowing. L4/5: No  significant disc bulge. There are mild degenerative facet changes bilaterally. No spinal canal stenosis or neural foraminal narrowing. 5/S1: No significant disc bulge. There are mild degenerative facet changes left greater than right. No spinal canal stenosis or neural foraminal narrowing.     Impression: Impression: 1. Mild degenerative facet change at the L4/5 and L5/S1 levels. There is no focal disc protrusion, spinal canal stenosis, or neural foraminal narrowing identified. Electronically Signed: Sal Pacheco MD  9/27/2024 7:42 PM EDT  Workstation ID: [id number]    CT Abdomen Pelvis With Contrast    Result Date: 9/27/2024  CT ABDOMEN PELVIS W CONTRAST Date of Exam: 9/27/2024 6:10 PM EDT Indication: low back pain, fever, tachycardia. Comparison: None available. Technique: Axial CT images were obtained of the abdomen and pelvis following the uneventful intravenous administration of Isovue-300, 97 mL. Reconstructed coronal and sagittal images were also obtained. Automated exposure control and iterative construction methods were used. Findings: Lung Bases: The visualized lung bases and lower mediastinal structures are unremarkable. Peritoneum: No free intraperitoneal air is visualized. Small to moderate volume free fluid is visualized in the pelvis. Abdominal wall: Unremarkable. Liver: Liver is normal in size and contour. No focal lesions. Biliary/Gallbladder: The gallbladder is normal without evidence of radiopaque gallstones. The biliary tree is nondilated. Pancreas: Pancreas is within normal limits. There is no evidence of pancreatic mass or peripancreatic inflammatory changes. Spleen: Spleen is normal in size and contour. Gastrointestinal/Mesentery: No evidence of bowel obstruction or gross inflammatory changes. The appendix appears within normal limits.  Adrenals: Adrenal glands are unremarkable. Kidneys: The kidneys are in anatomic position. No evidence of nephrolithiasis. No evidence of hydronephrosis or  significant perinephric fat stranding. Bladder: The urinary bladder is unremarkable. Reproductive organs:  The uterus and ovaries appear within millimeters on CT. Lymph Nodes: No significant adenopathy is identified. Vasculature: Unremarkable. Osseous Structures:  No acute fracture or aggressive lesions.     Impression: Impression: Small to moderate volume free fluid in the pelvis. Finding may be physiologic and possibly secondary to recently ruptured ovarian cyst however, in the setting of fever or signs of sepsis, underlying infectious process not excluded. Clinically correlate. Otherwise, unremarkable contrast-enhanced CT of the abdomen and pelvis. Electronically Signed: Albert Chau MD  9/27/2024 6:32 PM EDT  Workstation ID: [id number]         Current medications:  Scheduled Meds:piperacillin-tazobactam, 4.5 g, Intravenous, Q8H  sodium chloride, 10 mL, Intravenous, Q12H  vancomycin, 1,000 mg, Intravenous, Q12H      Continuous Infusions:Pharmacy to dose vancomycin,   sodium chloride, 100 mL/hr, Last Rate: 100 mL/hr (09/27/24 9335)      PRN Meds:.  acetaminophen    senna-docusate sodium **AND** polyethylene glycol **AND** bisacodyl **AND** bisacodyl    ondansetron    Pharmacy to dose vancomycin    sodium chloride    sodium chloride    Assessment & Plan   Assessment & Plan     Active Hospital Problems    Diagnosis  POA    **Sepsis [A41.9]  Yes    Elevated liver enzymes [R74.8]  Yes    Acute bilateral low back pain without sciatica [M54.50]  Yes    Elevated prolactin level [R79.89]  Yes      Resolved Hospital Problems   No resolved problems to display.        Brief Hospital Course to date:  Madalyn Alexandre is a 24 y.o. female with no significant PMH other than STI (Chlamydia) in 2020 who presented with fever of unknown etiology. She was found to have fever on admission with elevated transaminases and thrombocytopenia.    Plan:    SIRS: Leukopenia, fever, tachycardia, elevated procalcitonin.    - IV fluid bolus given  in the ED, continue gentle IV fluid  - CBC in the a.m.  - Vancomycin and Zosyn started in the ED, continue for now  - BC x 2 pending  - Urine culture pending  - ID consult for evaluation in the AM   - Monospot, HIV negative  - CBC, CMP in the a.m.  -- Patient with pelvic exam in the ED, later informed apparently swab was not sent to lab, patient does have previous history of Chlamydia, ? Repeat pelvic exam however discussed with Dr. Espino who did Pelvic exam, no discharge, no tenderness, cervix appeared normal   -- Free fluid is noted on CT abd/pelvis though, consider possible GYN consult but no abdominal pain or complaints on exam at this time as thought to possibly be secondary to recently ruptured ovarian cyst   -- check urine GC/chlamydia   -- need rule out tick borne illness given below, sent for Ehrlichia, lyme, etc- no tick exposure per patient (works inside at [employer] and at the [employer] at [employer])     Elevated liver enzymes  Thrombocytopenia (mild)  - Acute hep panel negative  - CT abdomen/pelvis negative for acute findings besides some small to moderate free fluid in pelvis as above   - Liver ultrasound PENDING  - GI consulted  - CMP in the a.m.  -- checking for tick borne illnesses as noted above     Low back pain  - MRI negative for acute findings  - Tylenol as needed      Total time spent: Time Spent: Time Spent: 35 minutes  Time spent includes time reviewing chart, face-to-face time, counseling patient/family/caregiver, ordering medications/tests/procedures, communicating with other health care professionals, documenting clinical information in the electronic health record, and coordination of care.    Expected Discharge Location and Transportation: home  Expected Discharge   Expected Discharge Date: 9/30/2024; Expected Discharge Time:      VTE Prophylaxis:  Mechanical VTE prophylaxis orders are present.         AM-PAC 6 Clicks Score (PT): 24 (09/27/24 5894)    CODE STATUS:   Code Status and Medical  Interventions: CPR (Attempt to Resuscitate); Full Support   Ordered at: 09/27/24 2148     Code Status (Patient has no pulse and is not breathing):    CPR (Attempt to Resuscitate)     Medical Interventions (Patient has pulse or is breathing):    Full Support       Ellie Baumann MD  09/28/24

## 2024-09-28 NOTE — CONSULTS
Referring Provider: Ellie Baumann MD   Reason for Consultation: Sepsis    Patient Care Team:  Provider, No Known as PCP - General    Chief complaint Fever, nausea and vomiting, back pain    Subjective .     History of present illness: Madalyn Alexandre is a 24 y.o.  LMP 2026 female who presented to the ED with complaint of  back pain, vomiting, fever.  She states that 3 days ago she developed low back pain.  She then had acute onset of fever with associated nausea and vomiting.  She was evaluated at [facility] who prescribed muscle relaxers which she notes does help the back pain but then it returns when the medication wears off.  Upon arrival to the ED, labs are concerning for elevated liver enzymes, elevated procalcitonin and leukopenia.  CT abdomen/pelvis notes small to moderate volume free fluid in the pelvis otherwise no acute findings.  MRI lumbar spine is negative for acute findings.  She is currently sexually active in a monogamous relationship  She was treated for chlamydia cervicitis several years ago  Her last pap was  and negative  Contraception is withdrawal and has used Plan B about 4 times in the last year.  She does not typically experience dyspareunia but does have pain cramp-like with orgasm.  She reports no abnormal vaginal discharge.  She had a pelvic exam in the emergency department which was essentially completely negative including the absence of pain on bimanual exam.  Pelvic imaging reveals a small to moderate amount of pelvic fluid which may well be physiologic.  There is no evidence of ovarian torsion.  She has had no gynecologic surgery in the past.    Review of Systems  Pertinent items are noted in HPI, all other systems reviewed and negative    History  Past Medical History:   Diagnosis Date    Abnormal Pap smear of cervix 20    Chlamydia 10/06/2020    Elevated prolactin level     Irregular menses    ,   Past Surgical History:   Procedure Laterality Date    NO PAST  SURGERIES     ,   Family History   Problem Relation Age of Onset    No Known Problems Other     Diabetes type II Maternal Grandmother    ,   Social History     Socioeconomic History    Marital status: Single   Tobacco Use    Smoking status: Never    Smokeless tobacco: Never   Vaping Use    Vaping status: Former    Substances: Nicotine, Flavoring    Devices: Refillable tank   Substance and Sexual Activity    Alcohol use: No    Drug use: Not Currently     Types: Marijuana    Sexual activity: Yes     Partners: Male     Birth control/protection: Condom, None     Comment: occasional use     E-cigarette/Vaping    E-cigarette/Vaping Use Former User      E-cigarette/Vaping Substances    Nicotine Yes     THC No     CBD No     Flavoring Yes      E-cigarette/Vaping Devices    Disposable No     Pre-filled or Refillable Cartridge No     Refillable Tank Yes     Pre-filled Pod No          ,   Medications Prior to Admission   Medication Sig Dispense Refill Last Dose    cyclobenzaprine (FLEXERIL) 10 MG tablet Take 1 tablet by mouth At Night As Needed for Muscle Spasms. 10 tablet 0     diclofenac (VOLTAREN) 75 MG EC tablet Take 1 tablet by mouth 2 (Two) Times a Day As Needed (pain). 20 tablet 0    , Scheduled Meds:  acetylcysteine (ACETADOTE) 8,280 mg in dextrose (D5W) 5 % 200 mL infusion, 150 mg/kg, Intravenous, Once   Followed by  acetylcysteine (ACETADOTE) 2,760 mg in dextrose (D5W) 5 % 500 mL infusion, 50 mg/kg, Intravenous, Once  doxycycline, 100 mg, Intravenous, Q12H  phytonadione (AQUA-MEPHYTON, VITAMIN K) 10 mg in sodium chloride 0.9 % 50 mL IVPB, 10 mg, Intravenous, Once  piperacillin-tazobactam, 4.5 g, Intravenous, Q8H  sodium chloride, 10 mL, Intravenous, Q12H   , Continuous Infusions:  acetylcysteine (ACETADOTE) 6000 mg in D5W infusion (NON-APAP LIVER FAILURE) Third Dose, 6.25 mg/kg/hr  sodium chloride, 100 mL/hr, Last Rate: 100 mL/hr (09/27/24 9273)   , PRN Meds:    acetaminophen    senna-docusate sodium **AND**  polyethylene glycol **AND** bisacodyl **AND** bisacodyl    ondansetron    sodium chloride    sodium chloride, and Allergies:  Vancomycin    Objective     Vital Signs   Temp:  [98.8 °F (37.1 °C)-103.1 °F (39.5 °C)] 103.1 °F (39.5 °C)  Heart Rate:  [] 104  Resp:  [14-20] 20  BP: ()/(57-82) 111/82    Physical Exam:   General Appearance: alert, appears stated age, and cooperative  Throat: oopharynx normal  Lungs: clear to auscultation, respirations regular, respirations even, and respirations unlabored  Heart: regular rhythm & normal rate, normal S1, S2, no murmur, no gallop, no rub, and no click  Abdomen: normal bowel sounds, no masses, soft non-tender, no guarding, and no rebound tenderness  Extremities: moves extremities well, no edema, no cyanosis, and no redness  Skin: no bleeding, bruising or rash  Psych: normal    Results Review:   I reviewed the patient's new clinical results.      Assessment & Plan       Sepsis    Elevated prolactin level    Elevated liver enzymes    Acute bilateral low back pain without sciatica      Fever, nausea vomiting, back pain  I think the likelihood of a gynecologic source of the above is quite low.  She is currently being treated with antibiotics that would cover pelvic inflammatory disease.  Should she fail to respond to either the current treatment regimen or the tincture of time and further gynecologic evaluation is needed please do not hesitate to contact us    I discussed the patients findings and my recommendations with patient and family    Ck Best MD  09/28/24  19:16 EDT      Electronically signed by Ck Best MD, 09/28/24, 7:25 PM EDT.

## 2024-09-28 NOTE — CONSULTS
[facility] Consult    Referring Provider: Arti Baumann MD    PCP: Provider, No Known    Reason for Consultation: Abnormal LFTs.    Chief complaint: Back pain    History of present illness:    Madalyn Alexandre is a 24 y.o. female who is admitted with a 1 week history of back pain.  There was associated vomiting.  In the emergency room she was found to be febrile to 102.5.  She is found to have elevated LFTs mainly in a hepatocellular pattern, with AST and ALT around 600 and borderline alkaline phosphatase elevation of 203.  Bilirubin is normal. Rather extensive evaluation in the emergency room showed unremarkable spinal MRI, negative pelvic exam, negative monospot, neg HIV, and negative CT scan abdomen and pelvis (apart from a small amount of dependent peritoneal fluid). Respiratory PCR panel and hepatitis panel are negative.  For back pain, the patient recently started naproxen and cyclobenzaprine on 9/25/2024.  Patient has taken Tylenol around-the-clock since Wednesday, September 25, 2024 (exactly 4 g/day).  She does not drink alcohol.  Denies use of over-the-counter alternative medicine/herbal remedies.    Allergies:  Vancomycin    Scheduled Meds:  piperacillin-tazobactam, 4.5 g, Intravenous, Q8H  sodium chloride, 10 mL, Intravenous, Q12H  vancomycin, 1,000 mg, Intravenous, Q12H    Infusions:  Pharmacy to dose vancomycin,   sodium chloride, 100 mL/hr, Last Rate: 100 mL/hr (09/27/24 5667)    PRN Meds:    acetaminophen    senna-docusate sodium **AND** polyethylene glycol **AND** bisacodyl **AND** bisacodyl    ondansetron    Pharmacy to dose vancomycin    sodium chloride    sodium chloride    Home Meds:  Medications Prior to Admission   Medication Sig Dispense Refill Last Dose    cyclobenzaprine (FLEXERIL) 10 MG tablet Take 1 tablet by mouth At Night As Needed for Muscle Spasms. 10 tablet 0     diclofenac (VOLTAREN) 75 MG EC tablet Take 1 tablet by mouth 2 (Two) Times a Day As Needed (pain). 20 tablet  "0      ROS: Review of Systems   Constitutional:  Positive for activity change, appetite change, chills, fatigue and fever. Negative for unexpected weight change.   HENT:  Negative for mouth sores and trouble swallowing.    Respiratory:  Negative for cough, chest tightness and shortness of breath.    Cardiovascular:  Negative for chest pain and palpitations.   Gastrointestinal:  Positive for constipation, nausea and vomiting. Negative for abdominal pain, blood in stool and diarrhea.   Musculoskeletal:  Positive for back pain.   Skin:  Negative for color change.   Hematological:  Does not bruise/bleed easily.   Psychiatric/Behavioral: Negative.  Negative for agitation, behavioral problems and confusion.      PAST MED HX:  Past Medical History:   Diagnosis Date    Abnormal Pap smear of cervix 4/12/22    Chlamydia 10/06/2020    Elevated prolactin level     Irregular menses      PAST SURG HX:  Past Surgical History:   Procedure Laterality Date    NO PAST SURGERIES       FAM HX:  Family History   Problem Relation Age of Onset    No Known Problems Other     Diabetes type II Maternal Grandmother      SOC HX:  Social History     Socioeconomic History    Marital status: Single   Tobacco Use    Smoking status: Never    Smokeless tobacco: Never   Vaping Use    Vaping status: Former    Substances: Nicotine, Flavoring    Devices: Refillable tank   Substance and Sexual Activity    Alcohol use: No    Drug use: Not Currently     Types: Marijuana    Sexual activity: Yes     Partners: Male     Birth control/protection: Condom, None     Comment: occasional use     PHYSICAL EXAM  /74 (BP Location: Right arm, Patient Position: Lying)   Pulse 88   Temp 98.8 °F (37.1 °C)   Resp 16   Ht 152.4 cm (60\")   Wt 55.2 kg (121 lb 9.6 oz)   LMP 09/10/2024 (Exact Date)   SpO2 98%   BMI 23.75 kg/m²   Wt Readings from Last 3 Encounters:   09/27/24 55.2 kg (121 lb 9.6 oz)   09/25/24 68.9 kg (152 lb)   05/23/24 68.5 kg (151 lb)   ,body mass " index is 23.75 kg/m².  Physical Exam  Constitutional:       General: She is not in acute distress.     Appearance: She is ill-appearing. She is not toxic-appearing or diaphoretic.   HENT:      Head: Normocephalic.      Nose: Nose normal. No congestion.      Mouth/Throat:      Mouth: Mucous membranes are moist.      Pharynx: No oropharyngeal exudate.   Eyes:      General: No scleral icterus.     Conjunctiva/sclera: Conjunctivae normal.   Cardiovascular:      Rate and Rhythm: Normal rate.      Pulses: Normal pulses.   Pulmonary:      Effort: Pulmonary effort is normal. No respiratory distress.   Abdominal:      General: Bowel sounds are normal.      Palpations: Abdomen is soft.      Tenderness: There is abdominal tenderness. There is no guarding.      Comments: Mildly tender in the epigastrium.   Musculoskeletal:      Cervical back: Normal range of motion.      Right lower leg: No edema.      Left lower leg: No edema.   Skin:     General: Skin is warm and dry.      Coloration: Skin is not jaundiced.   Neurological:      General: No focal deficit present.      Mental Status: She is alert and oriented to person, place, and time.      Comments: No asterixis.   Psychiatric:         Mood and Affect: Mood normal.         Behavior: Behavior normal.     Results Review:   I reviewed the patient's new clinical results.    Lab Results   Component Value Date    WBC 2.89 (L) 09/28/2024    HGB 13.5 09/28/2024    HGB 14.0 09/27/2024    HCT 39.7 09/28/2024    MCV 93.2 09/28/2024     (L) 09/28/2024       Lab Results   Component Value Date    INR 1.33 (H) 09/27/2024       Lab Results   Component Value Date    GLUCOSE 77 09/28/2024    BUN 5 (L) 09/28/2024    CREATININE 0.72 09/28/2024    BCR 6.9 (L) 09/28/2024     09/28/2024    K 3.7 09/28/2024    CO2 23.0 09/28/2024    CALCIUM 8.3 (L) 09/28/2024    ALBUMIN 3.6 09/28/2024    ALKPHOS 178 (H) 09/28/2024    BILITOT 1.0 09/28/2024     (H) 09/28/2024     (H)  09/28/2024      Latest Reference Range & Units 09/27/24 18:01   Ethanol 0 - 10 mg/dL <10   Acetaminophen 0.0 - 30.0 mcg/mL 8.5      Latest Reference Range & Units 09/27/24 20:39   Hep A IgM Non-Reactive  Non-Reactive   Hepatitis B Surface Ag Non-Reactive  Non-Reactive   Hep B Core IgM Non-Reactive  Non-Reactive   Hepatitis C Ab Non-Reactive  Non-Reactive      Latest Reference Range & Units 09/27/24 20:39   Monospot Negative  Negative   HIV-1/ HIV-2 Ab Non-Reactive  Non-Reactive     ASSESSMENTS/PLANS    Acute marked transaminase elevation.   Fever.  Back pain. MRI spine unremarkable. No evidence for pyelonephritis on CT. Urine culture NGTD.  Leukopenia with left shift and absolute lymphocytopenia.  Thrombocytopenia.  Mild coagulopathy.    Favor viral syndrome. No evidence for PVT or Budd chiari on portal duplex. Drug-induced liver injury from naproxen is possible, but rare. Will screen for autoimmune hepatitis, rhabdomyolysis, Lobo's, and vasoactive illicit drugs.    >> Avoid naproxen.  >> Autoimmune markers.  >> Ceruloplasmin.  >> CK.  >> Drug screen.  >> IV vitamin K and repeat INR tomorrow.  >> Doubt acetaminophen toxicity, however, given coagulopathy and worsening LFTs will begin N-acetylcysteine protocol.    I discussed the patient's findings and my recommendations with patient and her mother present in the room.    Mark I. Brunner, MD  09/28/24  13:32 EDT

## 2024-09-28 NOTE — PROGRESS NOTES
"Pharmacy Consult - Vancomycin Dosing and Monitoring    Madalyn Alexandre is a 24 y.o. female receiving vancomycin therapy.     Indication: sepsis  Consulting Provider: hospitalist  ID Consult: No    Goal AUC: 400-600 mg/L*hr    Current Antimicrobial Therapy  Anti-Infectives (From admission, onward)      Ordered     Dose/Rate Route Frequency Start Stop    09/27/24 2209  vancomycin (VANCOCIN) 1,000 mg in sodium chloride 0.9 % 250 mL IVPB-VTB        Ordering Provider: Jamee Manriquez RPH    1,000 mg  250 mL/hr over 60 Minutes Intravenous Every 12 Hours 09/28/24 1100 10/05/24 1059    09/27/24 2153  piperacillin-tazobactam (ZOSYN) 4.5 g IVPB in 100 mL NS MBP (CD)        Note to Pharmacy: First dose given in ED   Ordering Provider: Janette Shukla APRN    4.5 g  over 4 Hours Intravenous Every 8 Hours 09/28/24 0300 10/03/24 0259    09/27/24 2153  Pharmacy to dose vancomycin        Ordering Provider: Janette Shukla APRN     Does not apply Continuous PRN 09/27/24 2153 10/04/24 2152 09/27/24 2027  vancomycin IVPB 1500 mg in 0.9% NaCl (Premix) 500 mL        Ordering Provider: Bacilio Espino MD    22 mg/kg × 69.9 kg  333.3 mL/hr over 90 Minutes Intravenous Once 09/27/24 2130 09/27/24 2027  piperacillin-tazobactam (ZOSYN) 3.375 g IVPB in 100 mL NS MBP (CD)        Ordering Provider: Bacilio Espino MD    3.375 g  over 30 Minutes Intravenous Once 09/27/24 2100 09/27/24 2145          Allergies  Allergies as of 09/27/2024    (No Known Allergies)     Labs  Results from last 7 days   Lab Units 09/27/24  1801   BUN mg/dL 6   CREATININE mg/dL 0.77     Results from last 7 days   Lab Units 09/27/24  1801   WBC 10*3/mm3 2.54*     Evaluation of Dosing     Last Dose Received in the ED/Outside Facility: Yes  Is Patient on Dialysis or Renal Replacement: No    Height - 152.4 cm (60\")  Weight - 55.2 kg (121 lb 9.6 oz)    Estimated Creatinine Clearance: 86.3 mL/min (by C-G formula based on SCr of 0.77 mg/dL).    No intake/output " data recorded.    Microbiology and Radiology  Microbiology Results (last 10 days)       Procedure Component Value - Date/Time    COVID PRE-OP / PRE-PROCEDURE SCREENING ORDER (NO ISOLATION) - Swab, Nasopharynx [[id number]]  (Normal) Collected: 09/27/24 1728    Lab Status: Final result Specimen: Swab from Nasopharynx Updated: 09/27/24 1847    Narrative:      The following orders were created for panel order COVID PRE-OP / PRE-PROCEDURE SCREENING ORDER (NO ISOLATION) - Swab, Nasopharynx.  Procedure                               Abnormality         Status                     ---------                               -----------         ------                     COVID-19 and FLU A/B PCR...[[id number]]  Normal              Final result                 Please view results for these tests on the individual orders.    COVID-19 and FLU A/B PCR, 1 HR TAT - Swab, Nasopharynx [[id number]]  (Normal) Collected: 09/27/24 1728    Lab Status: Final result Specimen: Swab from Nasopharynx Updated: 09/27/24 1847     COVID19 Not Detected     Influenza A PCR Not Detected     Influenza B PCR Not Detected    Narrative:      Fact sheet for providers: https://www.fda.gov/media/788533/download    Fact sheet for patients: https://www.fda.gov/media/534077/download    Test performed by PCR.    Respiratory Panel PCR w/COVID-19(SARS-CoV-2) ALBERTO/ALIN/HAKAN/PAD/COR/RASHI In-House, NP Swab in UTM/VTM, 2 HR TAT - Swab, Nasopharynx [[id number]]  (Normal) Collected: 09/27/24 1728    Lab Status: Final result Specimen: Swab from Nasopharynx Updated: 09/27/24 2200     ADENOVIRUS, PCR Not Detected     Coronavirus 229E Not Detected     Coronavirus HKU1 Not Detected     Coronavirus NL63 Not Detected     Coronavirus OC43 Not Detected     COVID19 Not Detected     Human Metapneumovirus Not Detected     Human Rhinovirus/Enterovirus Not Detected     Influenza A PCR Not Detected     Influenza B PCR Not Detected     Parainfluenza Virus 1 Not Detected     Parainfluenza Virus 2  Not Detected     Parainfluenza Virus 3 Not Detected     Parainfluenza Virus 4 Not Detected     RSV, PCR Not Detected     Bordetella pertussis pcr Not Detected     Bordetella parapertussis PCR Not Detected     Chlamydophila pneumoniae PCR Not Detected     Mycoplasma pneumo by PCR Not Detected    Narrative:      In the setting of a positive respiratory panel with a viral infection PLUS a negative procalcitonin without other underlying concern for bacterial infection, consider observing off antibiotics or discontinuation of antibiotics and continue supportive care. If the respiratory panel is positive for atypical bacterial infection (Bordetella pertussis, Chlamydophila pneumoniae, or Mycoplasma pneumoniae), consider antibiotic de-escalation to target atypical bacterial infection.          Reported Vancomycin Levels              [employer] AUC Calculation:    Current AUC: --- mg/L*hr    Predicted Steady State AUC on Current Dose: --- mg/L*hr  _________________________________    Predicted Steady State AUC on New Dose: 528 mg/L*hr    Assessment/Plan:   Pharmacy consulted to dose vancomycin for empiric therapy, goal -600 mg/L*hr.  Patient loaded with vancomycin 1000 mg ( ~ 18 mg/kg)  Respiratory panel negative, MRSA PCR, BC x 2, Ucx pending.  Patient remains febrile (Tmax 100.5), serum creatinine is 0.77, BUN is 6, and WBC 2.54 (leukopenia), procal 0.69  Based on evaluation, initiate a maintenance regimen of vancomycin 1000 mg ( ~ 18 mg/kg) every 12 hours.  A vancomycin trough will be assessed on 9/29 @ 0600 (prior to 4th dose).  Will continue to follow and adjust vancomycin dose as needed based on renal function, cultures, and patient clinical status.    Thank you,    Jamee Manriquez RPH  9/27/2024  22:13 EDT

## 2024-09-28 NOTE — H&P
[facility] Medicine Services  HISTORY AND PHYSICAL    Patient Name: Madalyn Alexandre  : 2000  MRN: [id number]  Primary Care Physician: Provider, No Known  Date of admission: 2024    Subjective   Subjective     Chief Complaint:  Back pain, vomiting, fever     HPI:  Madalyn lAexandre is a 24 y.o. female who presents to the ED with complaint of  back pain, vomiting, fever.  She states that 3 days ago she developed low back pain.  She then had acute onset of fever with associated nausea and vomiting.  She was evaluated at [facility] who prescribed muscle relaxers which she notes does help the back pain but then it returns when the medication wears off.  Upon arrival to the ED, labs are concerning for elevated liver enzymes, elevated procalcitonin and leukopenia.  CT abdomen/pelvis notes small to moderate volume free fluid in the pelvis otherwise no acute findings.  MRI lumbar spine is negative for acute findings.  She will be admitted to hospital medicine for further evaluation.    Review of Systems   Constitutional:  Positive for activity change, appetite change, chills, fatigue and fever. Negative for diaphoresis and unexpected weight change.   HENT: Negative.  Negative for congestion, sinus pressure, sore throat and trouble swallowing.    Eyes: Negative.  Negative for visual disturbance.   Respiratory: Negative.  Negative for cough, chest tightness, shortness of breath and wheezing.    Cardiovascular:  Negative for chest pain, palpitations and leg swelling.   Gastrointestinal:  Positive for nausea and vomiting. Negative for abdominal distention, abdominal pain, constipation and diarrhea.   Endocrine: Negative.  Negative for polydipsia and polyphagia.   Genitourinary: Negative.  Negative for decreased urine volume, difficulty urinating, dysuria, frequency and urgency.   Musculoskeletal:  Positive for back pain. Negative for arthralgias, gait problem, joint swelling, myalgias, neck pain and  neck stiffness.   Skin: Negative.  Negative for color change, pallor, rash and wound.   Allergic/Immunologic: Negative.  Negative for immunocompromised state.   Neurological: Negative.  Positive for headaches (not persistent). Negative for dizziness, tremors, seizures, syncope, facial asymmetry, speech difficulty, weakness, light-headedness and numbness.   Hematological: Negative.  Does not bruise/bleed easily.   Psychiatric/Behavioral: Negative.  Negative for confusion. The patient is not nervous/anxious.         Personal History     Past Medical History:   Diagnosis Date    Abnormal Pap smear of cervix 4/12/22    Chlamydia 10/06/2020    Elevated prolactin level     Irregular menses        Past Surgical History:   Procedure Laterality Date    NO PAST SURGERIES         Family History:  family history includes Diabetes type II in her maternal grandmother; No Known Problems in an other family member.     Social History:  reports that she has never smoked. She has never used smokeless tobacco. She reports that she does not currently use drugs after having used the following drugs: Marijuana. She reports that she does not drink alcohol.  Social History     Social History Narrative    Not on file       Medications:  cyclobenzaprine, diclofenac, and norethindrone-ethinyl estradiol FE    No Known Allergies    Objective   Objective     Vital Signs:   Temp:  [100.5 °F (38.1 °C)-102.5 °F (39.2 °C)] 100.5 °F (38.1 °C)  Heart Rate:  [101-121] 101  Resp:  [18] 18  BP: (100-126)/(63-83) 105/67    Physical Exam   Constitutional: Awake, alert, ill appearing   Eyes: PERRLA, sclerae anicteric, no conjunctival injection  HENT: NCAT, mucous membranes moist  Neck: Supple, no thyromegaly, no lymphadenopathy, trachea midline  Respiratory: Clear to auscultation bilaterally, nonlabored respirations   Cardiovascular: tachycardic, no murmurs, rubs, or gallops, palpable pedal pulses bilaterally  Gastrointestinal: Positive bowel sounds, soft,  nontender, nondistended  Musculoskeletal: No bilateral ankle edema, no clubbing or cyanosis to extremities  Psychiatric: Appropriate affect, cooperative  Neurologic: Oriented x 3, strength symmetric in all extremities, Cranial Nerves grossly intact to confrontation, speech clear  Skin: No rashes      Result Review:  I have personally reviewed the results from the time of this admission to 9/27/2024 21:48 EDT and agree with these findings:  [x]  Laboratory list / accordion  [x]  Microbiology  [x]  Radiology  [x]  EKG/Telemetry   []  Cardiology/Vascular   []  Pathology  [x]  Old records  []  Other:  Most notable findings include:     LAB RESULTS:      Lab 09/27/24  1801   WBC 2.54*   HEMOGLOBIN 14.0   HEMATOCRIT 40.9   PLATELETS 132*   NEUTROS ABS 2.07   IMMATURE GRANS (ABS) 0.01   LYMPHS ABS 0.31*   MONOS ABS 0.13   EOS ABS 0.01   MCV 93.0   PROCALCITONIN 0.69*   LACTATE 0.8         Lab 09/27/24  1801   SODIUM 136   POTASSIUM 3.6   CHLORIDE 102   CO2 24.0   ANION GAP 10.0   BUN 6   CREATININE 0.77   EGFR 109.3   GLUCOSE 117*   CALCIUM 8.7         Lab 09/27/24  1801   TOTAL PROTEIN 6.9   ALBUMIN 4.0   GLOBULIN 2.9   ALT (SGPT) 614*   AST (SGOT) 614*   BILIRUBIN 0.6   ALK PHOS 203*   LIPASE 25                     Brief Urine Lab Results  (Last result in the past 365 days)        Color   Clarity   Blood   Leuk Est   Nitrite   Protein   CREAT   Urine HCG        09/27/24 1733               Negative             Microbiology Results (last 10 days)       Procedure Component Value - Date/Time    COVID PRE-OP / PRE-PROCEDURE SCREENING ORDER (NO ISOLATION) - Swab, Nasopharynx [[id number]]  (Normal) Collected: 09/27/24 1728    Lab Status: Final result Specimen: Swab from Nasopharynx Updated: 09/27/24 1847    Narrative:      The following orders were created for panel order COVID PRE-OP / PRE-PROCEDURE SCREENING ORDER (NO ISOLATION) - Swab, Nasopharynx.  Procedure                               Abnormality         Status                      ---------                               -----------         ------                     COVID-19 and FLU A/B PCR...[[id number]]  Normal              Final result                 Please view results for these tests on the individual orders.    COVID-19 and FLU A/B PCR, 1 HR TAT - Swab, Nasopharynx [[id number]]  (Normal) Collected: 09/27/24 1728    Lab Status: Final result Specimen: Swab from Nasopharynx Updated: 09/27/24 1847     COVID19 Not Detected     Influenza A PCR Not Detected     Influenza B PCR Not Detected    Narrative:      Fact sheet for providers: https://www.fda.gov/media/645732/download    Fact sheet for patients: https://www.fda.gov/media/498847/download    Test performed by PCR.            MRI Lumbar Spine With & Without Contrast    Result Date: 9/27/2024  MRI LUMBAR SPINE W WO CONTRAST Date of Exam: 9/27/2024 6:43 PM EDT Indication: sepsis, midline lumbar spine pain, eval for infectious process.  Comparison: None available. Technique:  Routine multiplanar/multisequence sequence images of the lumbar spine were obtained before and after the uneventful administration of Multihance.  Findings: There is normal height, alignment, and signal intensity of the lumbar vertebral bodies. Spinal cord terminates at the L1 level with normal signal seen within the conus. Visualized paraspinal soft tissues appear within normal limits. Postcontrast images demonstrate no pathologic contrast enhancement. Axial images demonstrate: L1/2: No significant disc bulge. Facet joints are within normal limits. No spinal canal stenosis or neural foraminal narrowing. L2/3: No significant disc bulge. Facet joints appear within normal limits. No spinal canal stenosis or neural foraminal narrowing. L3/4: No significant disc bulge. Facet joints are within normal limits. No spinal canal stenosis or neural foraminal narrowing. L4/5: No significant disc bulge. There are mild degenerative facet changes bilaterally. No spinal canal  stenosis or neural foraminal narrowing. 5/S1: No significant disc bulge. There are mild degenerative facet changes left greater than right. No spinal canal stenosis or neural foraminal narrowing.     Impression: Impression: 1. Mild degenerative facet change at the L4/5 and L5/S1 levels. There is no focal disc protrusion, spinal canal stenosis, or neural foraminal narrowing identified. Electronically Signed: Sal Pacheco MD  9/27/2024 7:42 PM EDT  Workstation ID: [id number]    CT Abdomen Pelvis With Contrast    Result Date: 9/27/2024  CT ABDOMEN PELVIS W CONTRAST Date of Exam: 9/27/2024 6:10 PM EDT Indication: low back pain, fever, tachycardia. Comparison: None available. Technique: Axial CT images were obtained of the abdomen and pelvis following the uneventful intravenous administration of Isovue-300, 97 mL. Reconstructed coronal and sagittal images were also obtained. Automated exposure control and iterative construction methods were used. Findings: Lung Bases: The visualized lung bases and lower mediastinal structures are unremarkable. Peritoneum: No free intraperitoneal air is visualized. Small to moderate volume free fluid is visualized in the pelvis. Abdominal wall: Unremarkable. Liver: Liver is normal in size and contour. No focal lesions. Biliary/Gallbladder: The gallbladder is normal without evidence of radiopaque gallstones. The biliary tree is nondilated. Pancreas: Pancreas is within normal limits. There is no evidence of pancreatic mass or peripancreatic inflammatory changes. Spleen: Spleen is normal in size and contour. Gastrointestinal/Mesentery: No evidence of bowel obstruction or gross inflammatory changes. The appendix appears within normal limits.  Adrenals: Adrenal glands are unremarkable. Kidneys: The kidneys are in anatomic position. No evidence of nephrolithiasis. No evidence of hydronephrosis or significant perinephric fat stranding. Bladder: The urinary bladder is unremarkable. Reproductive  organs:  The uterus and ovaries appear within millimeters on CT. Lymph Nodes: No significant adenopathy is identified. Vasculature: Unremarkable. Osseous Structures:  No acute fracture or aggressive lesions.     Impression: Impression: Small to moderate volume free fluid in the pelvis. Finding may be physiologic and possibly secondary to recently ruptured ovarian cyst however, in the setting of fever or signs of sepsis, underlying infectious process not excluded. Clinically correlate. Otherwise, unremarkable contrast-enhanced CT of the abdomen and pelvis. Electronically Signed: Albert Chau MD  9/27/2024 6:32 PM EDT  Workstation ID: [id number]         Assessment & Plan   Assessment & Plan       Sepsis    Elevated prolactin level    Elevated liver enzymes    Acute bilateral low back pain without sciatica    24 y.o. female who presents to the ED with complaint of 5 back pain, vomiting, fever.    Sepsis: Leukopenia, fever, tachycardia, elevated procalcitonin.  Source: Unclear  - IV fluid bolus given in the ED, continue gentle IV fluid  - CBC in the a.m.  - Vancomycin and Zosyn started in the ED, continue for now  - BC x 2 pending  - Urine culture pending  - ID consult for evaluation in the AM   - Monospot, HIV negative  - CBC, CMP in the a.m.  -- Patient with pelvic exam in the ED, later informed apparently swab was not sent to lab, patient does have previous history of Chlamydia, ? Repeat pelvic exam however discussed with Dr. Espino who did Pelvic exam, no discharge, no tenderness, cervix appeared normal   -- Free fluid is noted on CT abd/pelvis though, consider possible GYN consult but no abdominal pain or complaints on exam at this time as thought to possibly be secondary to recently ruptured ovarian cyst     Elevated liver enzymes  Thrombocytopenia (mild)  - Acute hep panel negative  - CT abdomen/pelvis negative for acute findings besides some small to moderate free fluid in pelvis as above   - Liver ultrasound in  the a.m.  - GI consult in the a.m.  - CMP in the a.m.    Low back pain  - MRI negative for acute findings  - Tylenol as needed    DVT prophylaxis: SCDs    CODE STATUS:   Code Status (Patient has no pulse and is not breathing): CPR (Attempt to Resuscitate)  Medical Interventions (Patient has pulse or is breathing): Full Support      Expected Discharge  Expected Discharge Date: 9/30/2024; Expected Discharge Time:       This note has been completed as part of a split-shared workflow.     Signature: Electronically signed by DEANNA Hunter, 09/27/24, 9:48 PM EDT.              Attending   Admission Attestation       I have performed an independent face-to-face diagnostic evaluation including performing an independent physical examination.  I approve of the documented plan of care above that was reviewed and developed with the advanced practice clinician (APC) and take responsibility for that plan along with its associated risks.  I have updated the HPI as appropriate.    Brief HPI    Is a 24-year-old female seen and examined by me this evening, agree with above documentation via DEANNA Greenfield. Patient reports overall feeling better during my exam, reports back pain currently improved. She denies any pelvic pain, dysuria, or urinary discharge. Patient had pelvic exam in the ED by ED provider. She denies any other new acute complaints or problems.     Attending Physical Exam:  Temp:  [99.8 °F (37.7 °C)-102.5 °F (39.2 °C)] 99.8 °F (37.7 °C)  Heart Rate:  [] 86  Resp:  [18] 18  BP: ()/(57-83) 97/57    Constitutional: No acute distress, awake, alert, resting comfortably in bed, on RA  HENT: NCAT, nares patent, mucous membranes moist  Respiratory: Clear to auscultation bilaterally, no rhonchi or wheezing, respiratory effort normal   Cardiovascular: RRR, no murmurs, rubs, or gallops  Gastrointestinal: Positive bowel sounds, soft, nontender, nondistended  Musculoskeletal: No bilateral ankle edema, no  clubbing or cyaonsis   Psychiatric: Appropriate affect, cooperative  Neurologic: Oriented x 3, strength symmetric in all extremities, Cranial Nerves grossly intact to confrontation, speech clear  Skin: No rashes      Result Review:  I have personally reviewed the results from the time of this admission to 9/28/2024 03:33 EDT and agree with these findings:  [x]  Laboratory list / accordion  [x]  Microbiology  [x]  Radiology  []  EKG/Telemetry   []  Cardiology/Vascular   []  Pathology  []  Old records  []  Other:  Most notable findings include:       WBC 2.54   INR 1.33   Hepatitis panel negative, HIV Negative, Monospot negative   Resp panel negative   CT abd/pelvis- small to moderate free fluid in pelvis, ? Possibly secondary to to ruptured ovarian cyst, underlying infectious process not excluded     Assessment and Plan:  Sepsis, Fever of unknown origin   Elevated LFTs, abdominal pain  - plans for evaluation by both ID and GI at this time, currently on IV Vancomycin and Zosyn   - Patient does have previous history of chlamydia, she has had pelvic exam by Dr. Espino, question of free fluid on CT abd/pelvis though, concerns of possible recent ruptured ovarian cyst however abdomen is currently not tender on exam, will ask GYN to evaluate as well.        See assessment and plan documented by APC above and updated/edited by me as appropriate.    RICKY Munguia,   09/28/24

## 2024-09-28 NOTE — ED NOTES
Madalyn Alexandre    Nursing Report ED to Floor:  Mental status: GCS 15  Ambulatory status: standby assist  Oxygen Therapy:  RA  Cardiac Rhythm: sinus tach  Admitted from: home  Safety Concerns:  none known  Social Issues: none known  ED Room #:  9    ED Nurse Phone Extension - [phone] or may call [phone].      HPI:   Chief Complaint   Patient presents with    Back Pain       Past Medical History:  Past Medical History:   Diagnosis Date    Abnormal Pap smear of cervix 4/12/22    Chlamydia 10/06/2020    Elevated prolactin level     Irregular menses         Past Surgical History:  Past Surgical History:   Procedure Laterality Date    NO PAST SURGERIES          Admitting Doctor:   TOM Munguia DO    Consulting Provider(s):  Consults       No orders found from 8/29/2024 to 9/28/2024.             Admitting Diagnosis:   The primary encounter diagnosis was Acute sepsis. Diagnoses of Acute midline low back pain without sciatica, Nausea and vomiting, unspecified vomiting type, Leukopenia, unspecified type, and Abnormal LFTs were also pertinent to this visit.    Most Recent Vitals:   Vitals:    09/27/24 2015 09/27/24 2019 09/27/24 2030 09/27/24 2034   BP: 102/72  108/69    BP Location:       Patient Position:       Pulse:  108 114 110   Resp:       Temp:       TempSrc:       SpO2:  99% 94% 97%   Weight:       Height:           Active LDAs/IV Access:   Lines, Drains & Airways       Active LDAs       Name Placement date Placement time Site Days    Peripheral IV 09/27/24 1800 Right Antecubital 09/27/24  1800  Antecubital  less than 1                    Labs (abnormal labs have a star):   Labs Reviewed   COMPREHENSIVE METABOLIC PANEL - Abnormal; Notable for the following components:       Result Value    Glucose 117 (*)     ALT (SGPT) 614 (*)     AST (SGOT) 614 (*)     Alkaline Phosphatase 203 (*)     All other components within normal limits    Narrative:     GFR Normal >60  Chronic Kidney Disease <60  Kidney Failure <15    "  URINALYSIS W/ CULTURE IF INDICATED - Abnormal; Notable for the following components:    Color, UA Dark Yellow (*)     Appearance, UA Cloudy (*)     Ketones, UA 15 mg/dL (1+) (*)     Protein,  mg/dL (2+) (*)     Leuk Esterase, UA Trace (*)     All other components within normal limits    Narrative:     In absence of clinical symptoms, the presence of pyuria, bacteria, and/or nitrites on the urinalysis result does not correlate with infection.   PROCALCITONIN - Abnormal; Notable for the following components:    Procalcitonin 0.69 (*)     All other components within normal limits    Narrative:     As a Marker for Sepsis (Non-Neonates):    1. <0.5 ng/mL represents a low risk of severe sepsis and/or septic shock.  2. >2 ng/mL represents a high risk of severe sepsis and/or septic shock.    As a Marker for Lower Respiratory Tract Infections that require antibiotic therapy:    PCT on Admission    Antibiotic Therapy       6-12 Hrs later    >0.5                Strongly Recommended  >0.25 - <0.5        Recommended   0.1 - 0.25          Discouraged              Remeasure/reassess PCT  <0.1                Strongly Discouraged     Remeasure/reassess PCT    As 28 day mortality risk marker: \"Change in Procalcitonin Result\" (>80% or <=80%) if Day 0 (or Day 1) and Day 4 values are available. Refer to http://www.[facility]-pct-calculator.com    Change in PCT <=80%  A decrease of PCT levels below or equal to 80% defines a positive change in PCT test result representing a higher risk for 28-day all-cause mortality of patients diagnosed with severe sepsis for septic shock.    Change in PCT >80%  A decrease of PCT levels of more than 80% defines a negative change in PCT result representing a lower risk for 28-day all-cause mortality of patients diagnosed with severe sepsis or septic shock.      CBC WITH AUTO DIFFERENTIAL - Abnormal; Notable for the following components:    WBC 2.54 (*)     RDW 12.0 (*)     Platelets 132 (*)     " Neutrophil % 81.5 (*)     Lymphocyte % 12.2 (*)     Lymphocytes, Absolute 0.31 (*)     All other components within normal limits   URINALYSIS, MICROSCOPIC ONLY - Abnormal; Notable for the following components:    RBC, UA 3-5 (*)     Squamous Epithelial Cells, UA 7-12 (*)     All other components within normal limits   COVID-19 AND FLU A/B, NP SWAB IN TRANSPORT MEDIA 1 HR TAT - Normal    Narrative:     Fact sheet for providers: https://www.fda.gov/media/784951/download    Fact sheet for patients: https://www.fda.gov/media/493845/download    Test performed by PCR.   LIPASE - Normal   LACTIC ACID, PLASMA - Normal   ACETAMINOPHEN LEVEL - Normal   COVID PRE-OP / PRE-PROCEDURE SCREENING ORDER (NO ISOLATION)    Narrative:     The following orders were created for panel order COVID PRE-OP / PRE-PROCEDURE SCREENING ORDER (NO ISOLATION) - Swab, Nasopharynx.  Procedure                               Abnormality         Status                     ---------                               -----------         ------                     COVID-19 and FLU A/B PCR...[[id number]]  Normal              Final result                 Please view results for these tests on the individual orders.   BLOOD CULTURE   BLOOD CULTURE   URINE CULTURE   CHLAMYDIA TRACHOMATIS, NEISSERIA GONORRHOEAE, PCR   HEPATITIS PANEL, ACUTE   HIV-1/O/2 ANTIGEN/ANTIBODY   MONONUCLEOSIS SCREEN   CMV IGG IGM   EBV ANTIBODY PROFILE   POCT PEFORM URINE PREGNANCY   CBC AND DIFFERENTIAL    Narrative:     The following orders were created for panel order CBC & Differential.  Procedure                               Abnormality         Status                     ---------                               -----------         ------                     CBC Auto Differential[[id number]]        Abnormal            Final result               Scan Slide[[id number]]                                                                    Please view results for these tests on the individual  orders.       Meds Given in ED:   Medications   vancomycin IVPB 1500 mg in 0.9% NaCl (Premix) 500 mL (has no administration in time range)   piperacillin-tazobactam (ZOSYN) 3.375 g IVPB in 100 mL NS MBP (CD) (has no administration in time range)   sodium chloride 0.9 % bolus 1,000 mL (1,000 mL Intravenous New Bag 9/27/24 1808)   iopamidol (ISOVUE-300) 61 % injection 97 mL (97 mL Intravenous Given 9/27/24 1817)   acetaminophen (TYLENOL) tablet 1,000 mg (1,000 mg Oral Given 9/27/24 2000)   gadobenate dimeglumine (MULTIHANCE) injection 15 mL (15 mL Intravenous Given 9/27/24 1923)           Last NIH score:                                                          Dysphagia screening results:        Elke Coma Scale:  No data recorded     CIWA:        Restraint Type:            Isolation Status:  No active isolations

## 2024-09-28 NOTE — PLAN OF CARE
Goal Outcome Evaluation:  Plan of Care Reviewed With: patient        Progress: improving  Outcome Evaluation: Pt admitted for sepsis. pt to have a liver ultrasound this AM for elevated liver test. NPO. fluids infusing. pt had slight reaction to Vancomycin, pt face became flushed and slightly swollen. pt given benedryl and symptoms subsided. VSERICA [place]. NSR. boyfriend at bedside. bed in low position. call bell within reach,

## 2024-09-28 NOTE — CONSULTS
INFECTIOUS DISEASE CONSULT/INITIAL HOSPITAL VISIT    Madalyn Alexandre  2000  [id number]    Date of Consult: 9/28/2024    Admission Date: 9/27/2024      Requesting Provider: Ellie Baumann MD  Evaluating Physician: Ozzy Givens MD    Reason for Consultation: Fever of unclear etiology    History of present illness:    Patient is a 24 y.o. female with h/o chlamydia, irregular menses, vaping, and marijuana use who presented to [facility] ED on 9/27 with low back pain with acute onset of fevers, rigors, nausea, and vomiting.  She was recently seen at [facility] for the back pain and started on muscle relaxers that she stated do help.  She has a pet cat and her boyfriend has a pet dog that goes outside and has risk for tick-borne illness.  On arrival to the ED, the patient had a Tmax of 102.5 and HR of 121.  Initial labs were WBC 2500 with 82% neutrophils, lactic acid 0.8, PCT 0.69, , , and bilirubin 0.6.  A respiratory panel PCR was negative.  A UA WBC was 0-2 with negative culture to date. A hepatitis panel was negative.  An HIV was negative.  A Monospot was negative.  CMV and EBV serologies are pending. Blood cultures are pending.  A CT/GC/trichomonas PCR is pending. A MRSA PCR is negative.  A tick-borne work up is pending.  Her ALT and AST are worse at 917 and 869, respectively, on 9/28 and her WBC is 2,900 with 51% segs/32% bands.  A CT scan of a/p with contrast on 9/27 showed small to moderate free pelvic fluid c/w possible ruptured ovarian cyst.  An MRI of lumbar spine showed mild degenerative changes.  A liver US showed no acute findings. She is currently on Vancomycin and Zosyn.  ID was asked to evaluate and manage her antibiotic therapy.     Past Medical History:   Diagnosis Date    Abnormal Pap smear of cervix 4/12/22    Chlamydia 10/06/2020    Elevated prolactin level     Irregular menses        Past Surgical History:   Procedure Laterality Date    NO PAST SURGERIES         Family History   Problem  Relation Age of Onset    No Known Problems Other     Diabetes type II Maternal Grandmother        Social History     Socioeconomic History    Marital status: Single   Tobacco Use    Smoking status: Never    Smokeless tobacco: Never   Vaping Use    Vaping status: Former    Substances: Nicotine, Flavoring    Devices: Refillable tank   Substance and Sexual Activity    Alcohol use: No    Drug use: Not Currently     Types: Marijuana    Sexual activity: Yes     Partners: Male     Birth control/protection: Condom, None     Comment: occasional use       Allergies   Allergen Reactions    Vancomycin Itching     Infusion rate related         Medication:    Current Facility-Administered Medications:     acetaminophen (TYLENOL) tablet 650 mg, 650 mg, Oral, Q4H PRN, Janette Shukla APRN, 650 mg at 09/28/24 1706    acetylcysteine (ACETADOTE) 8,280 mg in dextrose (D5W) 5 % 200 mL infusion, 150 mg/kg, Intravenous, Once **FOLLOWED BY** acetylcysteine (ACETADOTE) 2,760 mg in dextrose (D5W) 5 % 500 mL infusion, 50 mg/kg, Intravenous, Once **FOLLOWED BY** acetylcysteine (ACETADOTE) 6,000 mg in dextrose (D5W) 5 % 1,000 mL infusion, 6.25 mg/kg/hr, Intravenous, Continuous, Brunner, Mark I, MD    sennosides-docusate (PERICOLACE) 8.6-50 MG per tablet 2 tablet, 2 tablet, Oral, BID PRN **AND** polyethylene glycol (MIRALAX) packet 17 g, 17 g, Oral, Daily PRN **AND** bisacodyl (DULCOLAX) EC tablet 5 mg, 5 mg, Oral, Daily PRN **AND** bisacodyl (DULCOLAX) suppository 10 mg, 10 mg, Rectal, Daily PRN, Janette Shukla APRN    doxycycline (VIBRAMYCIN) 100 mg in sodium chloride 0.9 % 100 mL MBP, 100 mg, Intravenous, Q12H, Hay Waters PA, 100 mg at 09/28/24 1706    ondansetron (ZOFRAN) injection 4 mg, 4 mg, Intravenous, Q6H PRN, Janette Shukla APRN, 4 mg at 09/28/24 1707    phytonadione (AQUA-MEPHYTON, VITAMIN K) 10 mg in sodium chloride 0.9 % 50 mL IVPB, 10 mg, Intravenous, Once, Brunner, Mark I, MD    piperacillin-tazobactam (ZOSYN) 4.5 g  IVPB in 100 mL NS MBP (CD), 4.5 g, Intravenous, Q8H, Janette Shukla, APRN, 4.5 g at 09/28/24 1157    sodium chloride 0.9 % flush 10 mL, 10 mL, Intravenous, Q12H, Janette Shukla, APRN, 10 mL at 09/27/24 2244    sodium chloride 0.9 % flush 10 mL, 10 mL, Intravenous, PRN, Janette Shukla, APRN    sodium chloride 0.9 % infusion 40 mL, 40 mL, Intravenous, PRN, Janette Shukla R, APRN    sodium chloride 0.9 % infusion, 100 mL/hr, Intravenous, Continuous, Janette Shukla, APRN, Last Rate: 100 mL/hr at 09/27/24 2245, 100 mL/hr at 09/27/24 2245    Antibiotics:  Anti-Infectives (From admission, onward)      Ordered     Dose/Rate Route Frequency Start Stop    09/28/24 1514  doxycycline (VIBRAMYCIN) 100 mg in sodium chloride 0.9 % 100 mL MBP        Ordering Provider: Hay Waters PA    100 mg  over 60 Minutes Intravenous Every 12 Hours 09/28/24 1800 10/05/24 1759    09/27/24 2153  piperacillin-tazobactam (ZOSYN) 4.5 g IVPB in 100 mL NS MBP (CD)        Note to Pharmacy: First dose given in ED   Ordering Provider: Janette Shukla APRN    4.5 g  over 4 Hours Intravenous Every 8 Hours 09/28/24 0300 10/03/24 0259    09/27/24 2215  vancomycin (VANCOCIN) 1,000 mg in sodium chloride 0.9 % 250 mL IVPB-VTB        Ordering Provider: Jamee Manriquez RPH    20 mg/kg × 55.2 kg  250 mL/hr over 60 Minutes Intravenous Once 09/27/24 2315 09/27/24 2343    09/27/24 2027  piperacillin-tazobactam (ZOSYN) 3.375 g IVPB in 100 mL NS MBP (CD)        Ordering Provider: Bacilio Espino MD    3.375 g  over 30 Minutes Intravenous Once 09/27/24 2100 09/27/24 2145              Review of Systems:  Constitutional-- + Fever, chills.  Appetite decreased, and no malaise. No fatigue.  HEENT-- No new vision, hearing or throat complaints.  No epistaxis or oral sores.  Denies odynophagia or dysphagia. photophobia or neck stiffness.  CV-- No chest pain,  Resp-- No SOB/cough  GI- + nausea, vomiting, no diarrhea.  No hematochezia, melena, or hematemesis.  Denies jaundice or chronic liver disease.  -- No dysuria, hematuria, or flank pain.  Denies hesitancy, urgency or burning with urination.  Heme- No active bruising or bleeding;   MS-- no swelling or pain in the bones or joints of arms/legs.  + lower back pain.  Neuro-- No acute focal weakness or numbness in the arms or legs.    Skin--No rashes or lesions      Physical Exam:   Vital Signs  Temp (24hrs), Av.2 °F (37.9 °C), Min:98.8 °F (37.1 °C), Max:103.1 °F (39.5 °C)    Temp  Min: 98.8 °F (37.1 °C)  Max: 103.1 °F (39.5 °C)  BP  Min: 94/63  Max: 114/74  Pulse  Min: 74  Max: 114  Resp  Min: 14  Max: 20  SpO2  Min: 94 %  Max: 99 %    GENERAL: Awake and alert, in no acute distress.   HEENT: Normocephalic, atraumatic.  PERRL. EOMI. No conjunctival injection. No icterus. Oropharynx clear without evidence of thrush or exudate.  NECK: Supple   HEART: RRR; No murmur, rubs, gallops.   LUNGS: Clear to auscultation bilaterally without wheezing, rales, rhonchi. Normal respiratory effort. Nonlabored.  ABDOMEN: Soft, nontender, nondistended. No rebound or guarding. NO mass or HSM.  EXT:  No cyanosis, clubbing or edema. No cord.  :  Without Su catheter.  MSK: No joint effusions or erythema  SKIN: Warm and dry without cutaneous eruptions on Inspection/palpation.    NEURO: Oriented to PPT.  Motor 5/5 strength  PSYCHIATRIC: Normal insight and judgment. Cooperative with PE    Laboratory Data    Results from last 7 days   Lab Units 24  0718 24  1801   WBC 10*3/mm3 2.89* 2.54*   HEMOGLOBIN g/dL 13.5 14.0   HEMATOCRIT % 39.7 40.9   PLATELETS 10*3/mm3 123* 132*     Results from last 7 days   Lab Units 24  0718   SODIUM mmol/L 137   POTASSIUM mmol/L 3.7   CHLORIDE mmol/L 103   CO2 mmol/L 23.0   BUN mg/dL 5*   CREATININE mg/dL 0.72   GLUCOSE mg/dL 77   CALCIUM mg/dL 8.3*     Results from last 7 days   Lab Units 24  0718   ALK PHOS U/L 178*   BILIRUBIN mg/dL 1.0   ALT (SGPT) U/L 917*   AST (SGOT) U/L 869*              Results from last 7 days   Lab Units 09/27/24  1801   LACTATE mmol/L 0.8     Results from last 7 days   Lab Units 09/28/24  0718   CK TOTAL U/L 57         Estimated Creatinine Clearance: 92.3 mL/min (by C-G formula based on SCr of 0.72 mg/dL).      Microbiology:  Microbiology Results (last 10 days)       Procedure Component Value - Date/Time    MRSA Screen, PCR (Inpatient) - Swab, Nares [[id number]]  (Normal) Collected: 09/27/24 2250    Lab Status: Final result Specimen: Swab from Nares Updated: 09/28/24 0906     MRSA PCR Negative    Narrative:      The negative predictive value of this diagnostic test is high and should only be used to consider de-escalating anti-MRSA therapy. A positive result may indicate colonization with MRSA and must be correlated clinically.  MRSA Negative    Blood Culture - Blood, Arm, Right [[id number]]  (Normal) Collected: 09/27/24 1801    Lab Status: Preliminary result Specimen: Blood from Arm, Right Updated: 09/28/24 1916     Blood Culture No growth at 24 hours    COVID PRE-OP / PRE-PROCEDURE SCREENING ORDER (NO ISOLATION) - Swab, Nasopharynx [[id number]]  (Normal) Collected: 09/27/24 1728    Lab Status: Final result Specimen: Swab from Nasopharynx Updated: 09/27/24 1847    Narrative:      The following orders were created for panel order COVID PRE-OP / PRE-PROCEDURE SCREENING ORDER (NO ISOLATION) - Swab, Nasopharynx.  Procedure                               Abnormality         Status                     ---------                               -----------         ------                     COVID-19 and FLU A/B PCR...[[id number]]  Normal              Final result                 Please view results for these tests on the individual orders.    COVID-19 and FLU A/B PCR, 1 HR TAT - Swab, Nasopharynx [[id number]]  (Normal) Collected: 09/27/24 1728    Lab Status: Final result Specimen: Swab from Nasopharynx Updated: 09/27/24 1847     COVID19 Not Detected     Influenza A PCR Not Detected      Influenza B PCR Not Detected    Narrative:      Fact sheet for providers: https://www.fda.gov/media/583389/download    Fact sheet for patients: https://www.fda.gov/media/108402/download    Test performed by PCR.    Urine Culture - Urine, Urine, Clean Catch [[id number]]  (Normal) Collected: 09/27/24 1728    Lab Status: Preliminary result Specimen: Urine, Clean Catch Updated: 09/28/24 1226     Urine Culture No growth    Respiratory Panel PCR w/COVID-19(SARS-CoV-2) ALBERTO/ALIN/HAKAN/PAD/COR/RASHI In-House, NP Swab in UTM/VTM, 2 HR TAT - Swab, Nasopharynx [[id number]]  (Normal) Collected: 09/27/24 1728    Lab Status: Final result Specimen: Swab from Nasopharynx Updated: 09/27/24 2200     ADENOVIRUS, PCR Not Detected     Coronavirus 229E Not Detected     Coronavirus HKU1 Not Detected     Coronavirus NL63 Not Detected     Coronavirus OC43 Not Detected     COVID19 Not Detected     Human Metapneumovirus Not Detected     Human Rhinovirus/Enterovirus Not Detected     Influenza A PCR Not Detected     Influenza B PCR Not Detected     Parainfluenza Virus 1 Not Detected     Parainfluenza Virus 2 Not Detected     Parainfluenza Virus 3 Not Detected     Parainfluenza Virus 4 Not Detected     RSV, PCR Not Detected     Bordetella pertussis pcr Not Detected     Bordetella parapertussis PCR Not Detected     Chlamydophila pneumoniae PCR Not Detected     Mycoplasma pneumo by PCR Not Detected    Narrative:      In the setting of a positive respiratory panel with a viral infection PLUS a negative procalcitonin without other underlying concern for bacterial infection, consider observing off antibiotics or discontinuation of antibiotics and continue supportive care. If the respiratory panel is positive for atypical bacterial infection (Bordetella pertussis, Chlamydophila pneumoniae, or Mycoplasma pneumoniae), consider antibiotic de-escalation to target atypical bacterial infection.                  Radiology:  Imaging Results (Last 72 Hours)        Procedure Component Value Units Date/Time    US Liver [[id number]] Collected: 09/28/24 1328     Updated: 09/28/24 1336    Narrative:      US LIVER    Date of Exam: 9/28/2024 5:34 AM EDT    Indication: elevated liver enzymes.    Comparison: CT abdomen and pelvis 9/27/2024    Technique: Grayscale and color Doppler ultrasound evaluation of the right upper quadrant was performed.      Findings:  Visualized portions of the pancreas appear normal.    Normal appearance of the liver. No focal liver lesion. The portal vein is patent with hepatopetal flow. The middle hepatic vein is patent with normal waveform.    Normal appearance of the gallbladder.    Normal appearance of the common bile duct measuring 0.2 cm in diameter.    Normal appearance of the right kidney.      Impression:      Impression:  Normal right upper quadrant ultrasound.        Electronically Signed: John Gallardo MD    9/28/2024 1:33 PM EDT    Workstation ID: [id number]    MRI Lumbar Spine With & Without Contrast [[id number]] Collected: 09/27/24 1938     Updated: 09/27/24 1945    Narrative:        MRI LUMBAR SPINE W WO CONTRAST    Date of Exam: 9/27/2024 6:43 PM EDT    Indication: sepsis, midline lumbar spine pain, eval for infectious process.     Comparison: None available.    Technique:  Routine multiplanar/multisequence sequence images of the lumbar spine were obtained before and after the uneventful administration of Multihance.        Findings:  There is normal height, alignment, and signal intensity of the lumbar vertebral bodies. Spinal cord terminates at the L1 level with normal signal seen within the conus.    Visualized paraspinal soft tissues appear within normal limits.    Postcontrast images demonstrate no pathologic contrast enhancement.    Axial images demonstrate:    L1/2: No significant disc bulge. Facet joints are within normal limits. No spinal canal stenosis or neural foraminal narrowing.    L2/3: No significant disc bulge. Facet joints  appear within normal limits. No spinal canal stenosis or neural foraminal narrowing.    L3/4: No significant disc bulge. Facet joints are within normal limits. No spinal canal stenosis or neural foraminal narrowing.    L4/5: No significant disc bulge. There are mild degenerative facet changes bilaterally. No spinal canal stenosis or neural foraminal narrowing.    5/S1: No significant disc bulge. There are mild degenerative facet changes left greater than right. No spinal canal stenosis or neural foraminal narrowing.      Impression:      Impression:    1. Mild degenerative facet change at the L4/5 and L5/S1 levels. There is no focal disc protrusion, spinal canal stenosis, or neural foraminal narrowing identified.        Electronically Signed: Sal Pacheco MD    9/27/2024 7:42 PM EDT    Workstation ID: [id number]    CT Abdomen Pelvis With Contrast [[id number]] Collected: 09/27/24 1826     Updated: 09/27/24 1835    Narrative:      CT ABDOMEN PELVIS W CONTRAST    Date of Exam: 9/27/2024 6:10 PM EDT    Indication: low back pain, fever, tachycardia.    Comparison: None available.    Technique: Axial CT images were obtained of the abdomen and pelvis following the uneventful intravenous administration of Isovue-300, 97 mL. Reconstructed coronal and sagittal images were also obtained. Automated exposure control and iterative   construction methods were used.      Findings:    Lung Bases:  The visualized lung bases and lower mediastinal structures are unremarkable.    Peritoneum:  No free intraperitoneal air is visualized. Small to moderate volume free fluid is visualized in the pelvis.    Abdominal wall:  Unremarkable.    Liver:  Liver is normal in size and contour. No focal lesions.    Biliary/Gallbladder:  The gallbladder is normal without evidence of radiopaque gallstones. The biliary tree is nondilated.    Pancreas:  Pancreas is within normal limits. There is no evidence of pancreatic mass or peripancreatic inflammatory  changes.    Spleen:  Spleen is normal in size and contour.    Gastrointestinal/Mesentery:   No evidence of bowel obstruction or gross inflammatory changes. The appendix appears within normal limits.      Adrenals:  Adrenal glands are unremarkable.    Kidneys:  The kidneys are in anatomic position. No evidence of nephrolithiasis. No evidence of hydronephrosis or significant perinephric fat stranding.    Bladder:   The urinary bladder is unremarkable.    Reproductive organs:    The uterus and ovaries appear within millimeters on CT.    Lymph Nodes:  No significant adenopathy is identified.     Vasculature:  Unremarkable.    Osseous Structures:    No acute fracture or aggressive lesions.        Impression:      Impression:    Small to moderate volume free fluid in the pelvis. Finding may be physiologic and possibly secondary to recently ruptured ovarian cyst however, in the setting of fever or signs of sepsis, underlying infectious process not excluded. Clinically correlate.    Otherwise, unremarkable contrast-enhanced CT of the abdomen and pelvis.        Electronically Signed: Albert Chau MD    9/27/2024 6:32 PM EDT    Workstation ID: [id number]              Impression:   Sepsis- manifested by fever, tachycardia, leukopenia, thrombocytopenia, elevated procalcitonin, elevated  transaminases with unclear etiology.  She has a risk factor for exposure to ticks with pets that go outside.   This presentation is consistent with ehrlichiosis with transaminitis, leukopenia, and thrombocytopenia She has had a h/o STI with chlamydia, so will need STI work up as well.  Fevers, hectic  Lower back pain, with no focal infection seen by MRI.  Nausea and vomiting with CT scan that showed some free pelvic fluid that may be c/w ruptured ovarian cyst.  Leukopenia  Thrombocytopenia  Elevate liver transaminases with negative Hepatitis panel, worse.  Elevated procalcitonin, worse.      PLAN/RECOMMENDATIONS:   Thank you for asking us to see  Madalyn Alexandre, I recommend the following:  Follow blood cultures.  Follow tick-borne work up  Follow STI work up, EBV, CMV. Monospot negative.   Check HCG test.  Continue Zosyn  Stop Vancomycin as MRSA PCR was negative and no evidence of MRSA infection at this time.  Start Doxycycline 100 mg IV Q12H for empiric coverage for tick-borne illness    Ozzy Givens MD saw and examined patient, verified hx and PE, read all radiographic studies, reviewed labs and micro data, and formulated dx, plan for treatment and all medical decision making.      Hay Waters PA-C for Ozzy Givens MD       I discussed her complex situation with her and her mother in detail today.    Ozzy Givens MD  9/28/2024  19:30 EDT

## 2024-09-29 LAB
ALBUMIN SERPL-MCNC: 3.5 G/DL (ref 3.5–5.2)
ALBUMIN/GLOB SERPL: 1.2 G/DL
ALP SERPL-CCNC: 251 U/L (ref 39–117)
ALT SERPL W P-5'-P-CCNC: 2972 U/L (ref 1–33)
ANION GAP SERPL CALCULATED.3IONS-SCNC: 13 MMOL/L (ref 5–15)
AST SERPL-CCNC: 3202 U/L (ref 1–32)
B BURGDOR IGG+IGM SER QL IA: NEGATIVE
BACTERIA SPEC AEROBE CULT: NORMAL
BASOPHILS # BLD MANUAL: 0 10*3/MM3 (ref 0–0.2)
BASOPHILS NFR BLD MANUAL: 0 % (ref 0–1.5)
BILIRUB SERPL-MCNC: 2.1 MG/DL (ref 0–1.2)
BUN SERPL-MCNC: 4 MG/DL (ref 6–20)
BUN/CREAT SERPL: 5.3 (ref 7–25)
CALCIUM SPEC-SCNC: 8 MG/DL (ref 8.6–10.5)
CHLORIDE SERPL-SCNC: 100 MMOL/L (ref 98–107)
CMV IGG SERPL IA-ACNC: <0.6 U/ML (ref 0–0.59)
CMV IGM SERPL IA-ACNC: <30 AU/ML (ref 0–29.9)
CO2 SERPL-SCNC: 22 MMOL/L (ref 22–29)
CREAT SERPL-MCNC: 0.75 MG/DL (ref 0.57–1)
DEPRECATED RDW RBC AUTO: 41 FL (ref 37–54)
EGFRCR SERPLBLD CKD-EPI 2021: 112.8 ML/MIN/1.73
EOSINOPHIL # BLD MANUAL: 0 10*3/MM3 (ref 0–0.4)
EOSINOPHIL NFR BLD MANUAL: 0 % (ref 0.3–6.2)
ERYTHROCYTE [DISTWIDTH] IN BLOOD BY AUTOMATED COUNT: 12.1 % (ref 12.3–15.4)
GLOBULIN UR ELPH-MCNC: 3 GM/DL
GLUCOSE SERPL-MCNC: 92 MG/DL (ref 65–99)
HCT VFR BLD AUTO: 39.4 % (ref 34–46.6)
HGB BLD-MCNC: 13.6 G/DL (ref 12–15.9)
INR PPP: 1.27 (ref 0.89–1.12)
LYMPHOCYTES # BLD MANUAL: 0.58 10*3/MM3 (ref 0.7–3.1)
LYMPHOCYTES NFR BLD MANUAL: 2 % (ref 5–12)
MCH RBC QN AUTO: 31.8 PG (ref 26.6–33)
MCHC RBC AUTO-ENTMCNC: 34.5 G/DL (ref 31.5–35.7)
MCV RBC AUTO: 92.1 FL (ref 79–97)
MONOCYTES # BLD: 0.07 10*3/MM3 (ref 0.1–0.9)
NEUTROPHILS # BLD AUTO: 2.77 10*3/MM3 (ref 1.7–7)
NEUTROPHILS NFR BLD MANUAL: 37 % (ref 42.7–76)
NEUTS BAND NFR BLD MANUAL: 44 % (ref 0–5)
PLATELET # BLD AUTO: 133 10*3/MM3 (ref 140–450)
PMV BLD AUTO: 11.6 FL (ref 6–12)
POTASSIUM SERPL-SCNC: 3.3 MMOL/L (ref 3.5–5.2)
PROT SERPL-MCNC: 6.5 G/DL (ref 6–8.5)
PROTHROMBIN TIME: 16 SECONDS (ref 12.2–14.5)
RBC # BLD AUTO: 4.28 10*6/MM3 (ref 3.77–5.28)
RBC MORPH BLD: NORMAL
SMALL PLATELETS BLD QL SMEAR: ABNORMAL
SODIUM SERPL-SCNC: 135 MMOL/L (ref 136–145)
VARIANT LYMPHS NFR BLD MANUAL: 13 % (ref 19.6–45.3)
VARIANT LYMPHS NFR BLD MANUAL: 4 % (ref 0–5)
WBC MORPH BLD: NORMAL
WBC NRBC COR # BLD AUTO: 3.42 10*3/MM3 (ref 3.4–10.8)

## 2024-09-29 PROCEDURE — 25010000002 ACETYLCYSTEINE PER 100 MG: Performed by: INTERNAL MEDICINE

## 2024-09-29 PROCEDURE — 87798 DETECT AGENT NOS DNA AMP: CPT | Performed by: INTERNAL MEDICINE

## 2024-09-29 PROCEDURE — 99233 SBSQ HOSP IP/OBS HIGH 50: CPT | Performed by: INTERNAL MEDICINE

## 2024-09-29 PROCEDURE — 25010000002 PHYTONADIONE 10 MG/ML SOLUTION 1 ML AMPULE: Performed by: INTERNAL MEDICINE

## 2024-09-29 PROCEDURE — 99232 SBSQ HOSP IP/OBS MODERATE 35: CPT | Performed by: INTERNAL MEDICINE

## 2024-09-29 PROCEDURE — 80053 COMPREHEN METABOLIC PANEL: CPT | Performed by: INTERNAL MEDICINE

## 2024-09-29 PROCEDURE — 25010000002 PIPERACILLIN SOD-TAZOBACTAM PER 1 G: Performed by: NURSE PRACTITIONER

## 2024-09-29 PROCEDURE — 25010000002 PROCHLORPERAZINE 10 MG/2ML SOLUTION: Performed by: INTERNAL MEDICINE

## 2024-09-29 PROCEDURE — 25010000002 ONDANSETRON PER 1 MG: Performed by: NURSE PRACTITIONER

## 2024-09-29 PROCEDURE — 0 DEXTROSE 5 % SOLUTION 250 ML FLEX CONT: Performed by: INTERNAL MEDICINE

## 2024-09-29 PROCEDURE — 0 DEXTROSE 5 % SOLUTION 500 ML FLEX CONT: Performed by: INTERNAL MEDICINE

## 2024-09-29 PROCEDURE — 0 DEXTROSE 5 % SOLUTION 1,000 ML FLEX CONT: Performed by: INTERNAL MEDICINE

## 2024-09-29 PROCEDURE — 85025 COMPLETE CBC W/AUTO DIFF WBC: CPT | Performed by: INTERNAL MEDICINE

## 2024-09-29 PROCEDURE — 85007 BL SMEAR W/DIFF WBC COUNT: CPT | Performed by: INTERNAL MEDICINE

## 2024-09-29 PROCEDURE — 25810000003 SODIUM CHLORIDE 0.9 % SOLUTION: Performed by: NURSE PRACTITIONER

## 2024-09-29 PROCEDURE — 85610 PROTHROMBIN TIME: CPT | Performed by: INTERNAL MEDICINE

## 2024-09-29 RX ORDER — PROCHLORPERAZINE EDISYLATE 5 MG/ML
5 INJECTION INTRAMUSCULAR; INTRAVENOUS EVERY 6 HOURS PRN
Status: DISCONTINUED | OUTPATIENT
Start: 2024-09-29 | End: 2024-09-30 | Stop reason: HOSPADM

## 2024-09-29 RX ADMIN — PHYTONADIONE 10 MG: 10 INJECTION, EMULSION INTRAMUSCULAR; INTRAVENOUS; SUBCUTANEOUS at 17:42

## 2024-09-29 RX ADMIN — ACETAMINOPHEN 650 MG: 325 TABLET ORAL at 08:41

## 2024-09-29 RX ADMIN — Medication 10 ML: at 02:50

## 2024-09-29 RX ADMIN — PROCHLORPERAZINE EDISYLATE 5 MG: 5 INJECTION INTRAMUSCULAR; INTRAVENOUS at 11:44

## 2024-09-29 RX ADMIN — ONDANSETRON 4 MG: 2 INJECTION INTRAMUSCULAR; INTRAVENOUS at 18:20

## 2024-09-29 RX ADMIN — ACETYLCYSTEINE 6.25 MG/KG/HR: 6 INJECTION, SOLUTION INTRAVENOUS at 16:16

## 2024-09-29 RX ADMIN — PIPERACILLIN AND TAZOBACTAM 4.5 G: 4; .5 INJECTION, POWDER, FOR SOLUTION INTRAVENOUS at 11:44

## 2024-09-29 RX ADMIN — DOXYCYCLINE 100 MG: 100 INJECTION, POWDER, LYOPHILIZED, FOR SOLUTION INTRAVENOUS at 18:20

## 2024-09-29 RX ADMIN — SODIUM CHLORIDE 100 ML/HR: 9 INJECTION, SOLUTION INTRAVENOUS at 08:46

## 2024-09-29 RX ADMIN — ACETAMINOPHEN 650 MG: 325 TABLET ORAL at 02:50

## 2024-09-29 RX ADMIN — DOXYCYCLINE 100 MG: 100 INJECTION, POWDER, LYOPHILIZED, FOR SOLUTION INTRAVENOUS at 05:08

## 2024-09-29 RX ADMIN — PIPERACILLIN AND TAZOBACTAM 4.5 G: 4; .5 INJECTION, POWDER, FOR SOLUTION INTRAVENOUS at 02:35

## 2024-09-29 RX ADMIN — ONDANSETRON 4 MG: 2 INJECTION INTRAMUSCULAR; INTRAVENOUS at 09:55

## 2024-09-29 RX ADMIN — Medication 10 ML: at 08:41

## 2024-09-29 RX ADMIN — ACETYLCYSTEINE 2760 MG: 6 INJECTION, SOLUTION INTRAVENOUS at 00:09

## 2024-09-29 RX ADMIN — ONDANSETRON 4 MG: 2 INJECTION INTRAMUSCULAR; INTRAVENOUS at 02:50

## 2024-09-29 RX ADMIN — ACETYLCYSTEINE 8280 MG: 6 INJECTION, SOLUTION INTRAVENOUS at 02:51

## 2024-09-29 NOTE — PROGRESS NOTES
"GI Daily Progress Note  Subjective:    Chief Complaint: Follow-up acute hepatitis    Patient's back pain is significantly improved.  She has poor appetite today and also notes fatigue.  No abdominal pain or nausea.  High fever yesterday evening to 103.1.    Objective:    /76 (BP Location: Right arm, Patient Position: Lying)   Pulse 60   Temp 99.1 °F (37.3 °C) (Oral)   Resp 18   Ht 152.4 cm (60\")   Wt 55.2 kg (121 lb 9.6 oz)   LMP 09/10/2024 (Exact Date)   SpO2 99%   BMI 23.75 kg/m²     Physical Exam  Constitutional:       General: She is not in acute distress.     Appearance: She is ill-appearing. She is not toxic-appearing or diaphoretic.   HENT:      Head: Normocephalic.      Nose: No congestion.      Mouth/Throat:      Mouth: Mucous membranes are moist.   Eyes:      General: No scleral icterus.     Conjunctiva/sclera: Conjunctivae normal.   Cardiovascular:      Rate and Rhythm: Normal rate.      Pulses: Normal pulses.   Pulmonary:      Effort: Pulmonary effort is normal. No respiratory distress.   Abdominal:      General: Bowel sounds are normal. There is no distension.      Palpations: Abdomen is soft.      Tenderness: There is no abdominal tenderness. There is no guarding.   Musculoskeletal:      Cervical back: Normal range of motion.      Right lower leg: No edema.      Left lower leg: No edema.   Skin:     General: Skin is warm and dry.      Coloration: Skin is not jaundiced.   Neurological:      General: No focal deficit present.      Mental Status: She is alert and oriented to person, place, and time.   Psychiatric:         Mood and Affect: Mood normal.         Behavior: Behavior normal.     Lab  Lab Results   Component Value Date    WBC 3.42 09/29/2024    HGB 13.6 09/29/2024    HGB 13.5 09/28/2024    HGB 14.0 09/27/2024    MCV 92.1 09/29/2024     (L) 09/29/2024    INR 1.27 (H) 09/29/2024    INR 1.33 (H) 09/27/2024     Lab Results   Component Value Date    GLUCOSE 92 09/29/2024    BUN 4 " (L) 09/29/2024    CREATININE 0.75 09/29/2024    BCR 5.3 (L) 09/29/2024     (L) 09/29/2024    K 3.3 (L) 09/29/2024    CO2 22.0 09/29/2024    CALCIUM 8.0 (L) 09/29/2024    ALBUMIN 3.5 09/29/2024    ALKPHOS 251 (H) 09/29/2024    BILITOT 2.1 (H) 09/29/2024    ALT 2,972 (H) 09/29/2024    AST 3,202 (H) 09/29/2024      Latest Reference Range & Units 09/28/24 07:18   Creatine Kinase 20 - 180 U/L 57      Latest Reference Range & Units 09/28/24 07:18   Ammonia 11 - 51 umol/L 10 (L)   Ceruloplasmin 19 - 39 mg/dL 24      Latest Reference Range & Units 09/27/24 20:39   CMV IgG 0.00 - 0.59 U/mL <0.60   CMV IgM 0.0 - 29.9 AU/mL <30.0      Latest Reference Range & Units 09/28/24 18:25   Amphetamine, Urine Qual Negative  Negative   Barbiturates Screen, Urine Negative  Negative   Benzodiazepine Screen, Urine Negative  Negative   Buprenorphine, Screen, Urine Negative  Negative   Cocaine Screen, Urine Negative  Negative   Fentanyl, Urine Negative  Negative   Methamphetamine, Ur Negative  Negative   Methadone Screen , Urine Negative  Negative   Opiate Screen Negative  Negative   Oxycodone Screen, Urine Negative  Negative   Phencyclidine (PCP), Urine Negative  Negative   THC Screen, Urine Negative  Negative   Tricyclic Antidepressants Screen Negative  Negative     Assessment:    Acute marked transaminase elevation, worsening. Now with mild bilirubin elevation. On NAC protocol for possible Tylenol toxicity and possible impending liver failure. DILI from naproxen is not felt likely. Favor infectious etiology of hepatitis. ID consultant is covering tick-born diseases, pending serology. Liver vasculature appeared normal on RUQ duplex US.  Hepatitis panel, CMV serology, and Monospot negative.  Ceruloplasmin, CK, and drug screen are negative.  Autoimmune studies pending.  Fever, improving throughout today, but seems to spike each evening around 5 PM.  Back pain, improving. MRI spine unremarkable. No evidence for pyelonephritis on CT.  Urine culture: mixed acosta (contaminated).  Leukopenia with left shift and absolute lymphocytopenia, slightly improved.  Thrombocytopenia, persistent.  Mild coagulopathy, stable. Not responsive to vitamin K.    Plan:    >> Avoid naproxen.  >> Continue N-acetylcysteine protocol.  >> Awaiting autoimmune markers.  >> Awaiting EBV serology.  >> Repeat LFTs and PT/INR tomorrow.  >> If bilirubin or INR significantly worsen, recommend referral to liver transplant center.    Mark I. Brunner, MD  09/29/24  13:53 EDT

## 2024-09-29 NOTE — PLAN OF CARE
Goal Outcome Evaluation:  Plan of Care Reviewed With: patient        Progress: no change  Outcome Evaluation: VSS. RA. Alert and oriented x4. No complaints of pain. Pt complaints of nausea. PRN's given. Antibiotics infused. Fluids infusing. Ambulating independently. low grade temp 99.3 this AM- PRN tylenol given. pt resting comfortably. no further complaints at this time.    3rd dose of Acetylcysteine infusing this afternoon along with Vitamin K one time infusion.

## 2024-09-29 NOTE — PROGRESS NOTES
INFECTIOUS DISEASE PROGRESS NOTE    Madalyn Alexandre  2000  [id number]    Date of Consult: 9/28/2024    Admission Date: 9/27/2024      Requesting Provider: Ellie Baumann MD  Evaluating Physician: Ozzy Givens MD    Reason for Consultation: Fever of unclear etiology    History of present illness:    Patient is a 24 y.o. female with h/o chlamydia, irregular menses, vaping, and marijuana use who presented to [facility] ED on 9/27 with low back pain with acute onset of fevers, rigors, nausea, and vomiting.  She was recently seen at [facility] for the back pain and started on muscle relaxers that she stated do help.  She has a pet cat and her boyfriend has a pet dog that goes outside and has risk for tick-borne illness.  On arrival to the ED, the patient had a Tmax of 102.5 and HR of 121.  Initial labs were WBC 2500 with 82% neutrophils, lactic acid 0.8, PCT 0.69, , , and bilirubin 0.6.  A respiratory panel PCR was negative.  A UA WBC was 0-2 with negative culture to date. A hepatitis panel was negative.  An HIV was negative.  A Monospot was negative.  CMV and EBV serologies are pending. Blood cultures are pending.  A CT/GC/trichomonas PCR is pending. A MRSA PCR is negative.  A tick-borne work up is pending.  Her ALT and AST are worse at 917 and 869, respectively, on 9/28 and her WBC is 2,900 with 51% segs/32% bands.  A CT scan of a/p with contrast on 9/27 showed small to moderate free pelvic fluid c/w possible ruptured ovarian cyst.  An MRI of lumbar spine showed mild degenerative changes.  A liver US showed no acute findings. She is currently on Vancomycin and Zosyn.  ID was asked to evaluate and manage her antibiotic therapy.     9/29/24: Tmax 103.1, currently 99.3.  WBC improved to 3.42 with 37% segs/44% bands, plts better to 133, ALT worse to 2972, and AST worse to 3202, Bilirubin worse to 2.1.  UDS negative. HCG negative. Lyme EIA negative. CMV negative. She denies n/v/d, rashes, soa/cough, no  headaches, and no pain.    Past Medical History:   Diagnosis Date    Abnormal Pap smear of cervix 4/12/22    Chlamydia 10/06/2020    Elevated prolactin level     Irregular menses        Past Surgical History:   Procedure Laterality Date    NO PAST SURGERIES         Family History   Problem Relation Age of Onset    No Known Problems Other     Diabetes type II Maternal Grandmother        Social History     Socioeconomic History    Marital status: Single   Tobacco Use    Smoking status: Never    Smokeless tobacco: Never   Vaping Use    Vaping status: Former    Substances: Nicotine, Flavoring    Devices: RefLivelyble tank   Substance and Sexual Activity    Alcohol use: No    Drug use: Not Currently     Types: Marijuana    Sexual activity: Yes     Partners: Male     Birth control/protection: Condom, None     Comment: occasional use       Allergies   Allergen Reactions    Vancomycin Itching     Infusion rate related         Medication:    Current Facility-Administered Medications:     [COMPLETED] acetylcysteine (ACETADOTE) 8,280 mg in dextrose (D5W) 5 % 200 mL infusion, 150 mg/kg, Intravenous, Once, Last Rate: 241.4 mL/hr at 09/29/24 0251, 8,280 mg at 09/29/24 0251 **FOLLOWED BY** [COMPLETED] acetylcysteine (ACETADOTE) 2,760 mg in dextrose (D5W) 5 % 500 mL infusion, 50 mg/kg, Intravenous, Once, Last Rate: 125 mL/hr at 09/29/24 0009, 2,760 mg at 09/29/24 0009 **FOLLOWED BY** acetylcysteine (ACETADOTE) 6,000 mg in dextrose (D5W) 5 % 1,000 mL infusion, 6.25 mg/kg/hr, Intravenous, Continuous, Brunner, Mark I, MD, Last Rate: 57.5 mL/hr at 09/29/24 1616, 6.25 mg/kg/hr at 09/29/24 1616    sennosides-docusate (PERICOLACE) 8.6-50 MG per tablet 2 tablet, 2 tablet, Oral, BID PRN **AND** polyethylene glycol (MIRALAX) packet 17 g, 17 g, Oral, Daily PRN **AND** bisacodyl (DULCOLAX) EC tablet 5 mg, 5 mg, Oral, Daily PRN **AND** bisacodyl (DULCOLAX) suppository 10 mg, 10 mg, Rectal, Daily PRN, Janette Shukla, APRN    doxycycline  (VIBRAMYCIN) 100 mg in sodium chloride 0.9 % 100 mL MBP, 100 mg, Intravenous, Q12H, Hay Waters PA, 100 mg at 24 0508    ondansetron (ZOFRAN) injection 4 mg, 4 mg, Intravenous, Q6H PRN, Janette Shukla APRCINDY, 4 mg at 24 0955    phytonadione (AQUA-MEPHYTON, VITAMIN K) 10 mg in sodium chloride 0.9 % 50 mL IVPB, 10 mg, Intravenous, Once, Brunner, Mark I, MD    prochlorperazine (COMPAZINE) injection 5 mg, 5 mg, Intravenous, Q6H PRN, Ellie Baumann MD, 5 mg at 24 1144    sodium chloride 0.9 % flush 10 mL, 10 mL, Intravenous, Q12H, Janette Shukla, APRN, 10 mL at 24 0841    sodium chloride 0.9 % flush 10 mL, 10 mL, Intravenous, PRN, Janette Shukla, APRN    sodium chloride 0.9 % infusion 40 mL, 40 mL, Intravenous, PRN, Janette Shukla, APRN    sodium chloride 0.9 % infusion, 100 mL/hr, Intravenous, Continuous, Janette Shukla, APRN, Last Rate: 100 mL/hr at 24 0846, 100 mL/hr at 24 0846    Antibiotics:  Anti-Infectives (From admission, onward)      Ordered     Dose/Rate Route Frequency Start Stop    24 1514  doxycycline (VIBRAMYCIN) 100 mg in sodium chloride 0.9 % 100 mL MBP        Ordering Provider: Hay Waters PA    100 mg  over 60 Minutes Intravenous Every 12 Hours 24 1800 10/05/24 1759    24 2215  vancomycin (VANCOCIN) 1,000 mg in sodium chloride 0.9 % 250 mL IVPB-VTB        Ordering Provider: Jamee Manriquez RPH    20 mg/kg × 55.2 kg  250 mL/hr over 60 Minutes Intravenous Once 24 2315 24 2343    24  piperacillin-tazobactam (ZOSYN) 3.375 g IVPB in 100 mL NS MBP (CD)        Ordering Provider: Bacilio Espino MD    3.375 g  over 30 Minutes Intravenous Once 24 2100 24 9467              Review of Systems:  See above.       Physical Exam:   Vital Signs  Temp (24hrs), Av.4 °F (38 °C), Min:99 °F (37.2 °C), Max:103.1 °F (39.5 °C)    Temp  Min: 99 °F (37.2 °C)  Max: 103.1 °F (39.5 °C)  BP  Min: 91/75  Max:  118/72  Pulse  Min: 60  Max: 104  Resp  Min: 16  Max: 18  SpO2  Min: 95 %  Max: 100 %    GENERAL: Awake and alert, in no acute distress.   HEENT: Normocephalic, atraumatic.  PERRL. EOMI. No conjunctival injection. No icterus. Oropharynx clear without evidence of thrush or exudate.  NECK: Supple   HEART: RRR; No murmur, rubs, gallops.   LUNGS: Clear to auscultation bilaterally without wheezing, rales, rhonchi. Normal respiratory effort. Nonlabored.  ABDOMEN: Soft, nontender, nondistended. No rebound or guarding. NO mass or HSM.  EXT:  No cyanosis, clubbing or edema. No cord.  :  Without Su catheter.  MSK: No joint effusions or erythema  SKIN: Warm and dry without cutaneous eruptions on Inspection/palpation.    NEURO: Oriented to PPT.  Motor 5/5 strength  PSYCHIATRIC: Normal insight and judgment. Cooperative with PE    Laboratory Data    Results from last 7 days   Lab Units 09/29/24  0640 09/28/24  0718 09/27/24  1801   WBC 10*3/mm3 3.42 2.89* 2.54*   HEMOGLOBIN g/dL 13.6 13.5 14.0   HEMATOCRIT % 39.4 39.7 40.9   PLATELETS 10*3/mm3 133* 123* 132*     Results from last 7 days   Lab Units 09/29/24  0640   SODIUM mmol/L 135*   POTASSIUM mmol/L 3.3*   CHLORIDE mmol/L 100   CO2 mmol/L 22.0   BUN mg/dL 4*   CREATININE mg/dL 0.75   GLUCOSE mg/dL 92   CALCIUM mg/dL 8.0*     Results from last 7 days   Lab Units 09/29/24  0640   ALK PHOS U/L 251*   BILIRUBIN mg/dL 2.1*   ALT (SGPT) U/L 2,972*   AST (SGOT) U/L 3,202*             Results from last 7 days   Lab Units 09/27/24  1801   LACTATE mmol/L 0.8     Results from last 7 days   Lab Units 09/28/24  0718   CK TOTAL U/L 57         Estimated Creatinine Clearance: 88.6 mL/min (by C-G formula based on SCr of 0.75 mg/dL).      Microbiology:  Microbiology Results (last 10 days)       Procedure Component Value - Date/Time    MRSA Screen, PCR (Inpatient) - Swab, Nares [[id number]]  (Normal) Collected: 09/27/24 4102    Lab Status: Final result Specimen: Swab from Nares Updated:  09/28/24 0906     MRSA PCR Negative    Narrative:      The negative predictive value of this diagnostic test is high and should only be used to consider de-escalating anti-MRSA therapy. A positive result may indicate colonization with MRSA and must be correlated clinically.  MRSA Negative    Blood Culture - Blood, Wrist, Right [[id number]]  (Normal) Collected: 09/27/24 2039    Lab Status: Preliminary result Specimen: Blood from Wrist, Right Updated: 09/28/24 2132     Blood Culture No growth at 24 hours    Blood Culture - Blood, Arm, Right [[id number]]  (Normal) Collected: 09/27/24 1801    Lab Status: Preliminary result Specimen: Blood from Arm, Right Updated: 09/28/24 1916     Blood Culture No growth at 24 hours    COVID PRE-OP / PRE-PROCEDURE SCREENING ORDER (NO ISOLATION) - Swab, Nasopharynx [[id number]]  (Normal) Collected: 09/27/24 1728    Lab Status: Final result Specimen: Swab from Nasopharynx Updated: 09/27/24 1847    Narrative:      The following orders were created for panel order COVID PRE-OP / PRE-PROCEDURE SCREENING ORDER (NO ISOLATION) - Swab, Nasopharynx.  Procedure                               Abnormality         Status                     ---------                               -----------         ------                     COVID-19 and FLU A/B PCR...[[id number]]  Normal              Final result                 Please view results for these tests on the individual orders.    COVID-19 and FLU A/B PCR, 1 HR TAT - Swab, Nasopharynx [[id number]]  (Normal) Collected: 09/27/24 1728    Lab Status: Final result Specimen: Swab from Nasopharynx Updated: 09/27/24 1847     COVID19 Not Detected     Influenza A PCR Not Detected     Influenza B PCR Not Detected    Narrative:      Fact sheet for providers: https://www.fda.gov/media/156465/download    Fact sheet for patients: https://www.fda.gov/media/611106/download    Test performed by PCR.    Urine Culture - Urine, Urine, Clean Catch [[id number]] Collected: 09/27/24  1728    Lab Status: Final result Specimen: Urine, Clean Catch Updated: 09/29/24 0941     Urine Culture >100,000 CFU/mL Mixed Beena Isolated    Narrative:      Specimen contains mixed organisms of questionable pathogenicity suggestive of contamination. If symptoms persist, suggest recollection.  Colonization of the urinary tract without infection is common. Treatment is discouraged unless the patient is symptomatic, pregnant, or undergoing an invasive urologic procedure.    Respiratory Panel PCR w/COVID-19(SARS-CoV-2) ALBERTO/ALIN/HAKAN/PAD/COR/RASHI In-House, NP Swab in UTM/VTM, 2 HR TAT - Swab, Nasopharynx [[id number]]  (Normal) Collected: 09/27/24 1728    Lab Status: Final result Specimen: Swab from Nasopharynx Updated: 09/27/24 2200     ADENOVIRUS, PCR Not Detected     Coronavirus 229E Not Detected     Coronavirus HKU1 Not Detected     Coronavirus NL63 Not Detected     Coronavirus OC43 Not Detected     COVID19 Not Detected     Human Metapneumovirus Not Detected     Human Rhinovirus/Enterovirus Not Detected     Influenza A PCR Not Detected     Influenza B PCR Not Detected     Parainfluenza Virus 1 Not Detected     Parainfluenza Virus 2 Not Detected     Parainfluenza Virus 3 Not Detected     Parainfluenza Virus 4 Not Detected     RSV, PCR Not Detected     Bordetella pertussis pcr Not Detected     Bordetella parapertussis PCR Not Detected     Chlamydophila pneumoniae PCR Not Detected     Mycoplasma pneumo by PCR Not Detected    Narrative:      In the setting of a positive respiratory panel with a viral infection PLUS a negative procalcitonin without other underlying concern for bacterial infection, consider observing off antibiotics or discontinuation of antibiotics and continue supportive care. If the respiratory panel is positive for atypical bacterial infection (Bordetella pertussis, Chlamydophila pneumoniae, or Mycoplasma pneumoniae), consider antibiotic de-escalation to target atypical bacterial infection.                   Radiology:  Imaging Results (Last 72 Hours)       Procedure Component Value Units Date/Time    US Liver [[id number]] Collected: 09/28/24 1328     Updated: 09/28/24 1336    Narrative:      US LIVER    Date of Exam: 9/28/2024 5:34 AM EDT    Indication: elevated liver enzymes.    Comparison: CT abdomen and pelvis 9/27/2024    Technique: Grayscale and color Doppler ultrasound evaluation of the right upper quadrant was performed.      Findings:  Visualized portions of the pancreas appear normal.    Normal appearance of the liver. No focal liver lesion. The portal vein is patent with hepatopetal flow. The middle hepatic vein is patent with normal waveform.    Normal appearance of the gallbladder.    Normal appearance of the common bile duct measuring 0.2 cm in diameter.    Normal appearance of the right kidney.      Impression:      Impression:  Normal right upper quadrant ultrasound.        Electronically Signed: John Gallardo MD    9/28/2024 1:33 PM EDT    Workstation ID: [id number]    MRI Lumbar Spine With & Without Contrast [[id number]] Collected: 09/27/24 1938     Updated: 09/27/24 1945    Narrative:        MRI LUMBAR SPINE W WO CONTRAST    Date of Exam: 9/27/2024 6:43 PM EDT    Indication: sepsis, midline lumbar spine pain, eval for infectious process.     Comparison: None available.    Technique:  Routine multiplanar/multisequence sequence images of the lumbar spine were obtained before and after the uneventful administration of Multihance.        Findings:  There is normal height, alignment, and signal intensity of the lumbar vertebral bodies. Spinal cord terminates at the L1 level with normal signal seen within the conus.    Visualized paraspinal soft tissues appear within normal limits.    Postcontrast images demonstrate no pathologic contrast enhancement.    Axial images demonstrate:    L1/2: No significant disc bulge. Facet joints are within normal limits. No spinal canal stenosis or neural foraminal  narrowing.    L2/3: No significant disc bulge. Facet joints appear within normal limits. No spinal canal stenosis or neural foraminal narrowing.    L3/4: No significant disc bulge. Facet joints are within normal limits. No spinal canal stenosis or neural foraminal narrowing.    L4/5: No significant disc bulge. There are mild degenerative facet changes bilaterally. No spinal canal stenosis or neural foraminal narrowing.    5/S1: No significant disc bulge. There are mild degenerative facet changes left greater than right. No spinal canal stenosis or neural foraminal narrowing.      Impression:      Impression:    1. Mild degenerative facet change at the L4/5 and L5/S1 levels. There is no focal disc protrusion, spinal canal stenosis, or neural foraminal narrowing identified.        Electronically Signed: Sal Pacheco MD    9/27/2024 7:42 PM EDT    Workstation ID: [id number]    CT Abdomen Pelvis With Contrast [[id number]] Collected: 09/27/24 1826     Updated: 09/27/24 1835    Narrative:      CT ABDOMEN PELVIS W CONTRAST    Date of Exam: 9/27/2024 6:10 PM EDT    Indication: low back pain, fever, tachycardia.    Comparison: None available.    Technique: Axial CT images were obtained of the abdomen and pelvis following the uneventful intravenous administration of Isovue-300, 97 mL. Reconstructed coronal and sagittal images were also obtained. Automated exposure control and iterative   construction methods were used.      Findings:    Lung Bases:  The visualized lung bases and lower mediastinal structures are unremarkable.    Peritoneum:  No free intraperitoneal air is visualized. Small to moderate volume free fluid is visualized in the pelvis.    Abdominal wall:  Unremarkable.    Liver:  Liver is normal in size and contour. No focal lesions.    Biliary/Gallbladder:  The gallbladder is normal without evidence of radiopaque gallstones. The biliary tree is nondilated.    Pancreas:  Pancreas is within normal limits. There is no  evidence of pancreatic mass or peripancreatic inflammatory changes.    Spleen:  Spleen is normal in size and contour.    Gastrointestinal/Mesentery:   No evidence of bowel obstruction or gross inflammatory changes. The appendix appears within normal limits.      Adrenals:  Adrenal glands are unremarkable.    Kidneys:  The kidneys are in anatomic position. No evidence of nephrolithiasis. No evidence of hydronephrosis or significant perinephric fat stranding.    Bladder:   The urinary bladder is unremarkable.    Reproductive organs:    The uterus and ovaries appear within millimeters on CT.    Lymph Nodes:  No significant adenopathy is identified.     Vasculature:  Unremarkable.    Osseous Structures:    No acute fracture or aggressive lesions.        Impression:      Impression:    Small to moderate volume free fluid in the pelvis. Finding may be physiologic and possibly secondary to recently ruptured ovarian cyst however, in the setting of fever or signs of sepsis, underlying infectious process not excluded. Clinically correlate.    Otherwise, unremarkable contrast-enhanced CT of the abdomen and pelvis.        Electronically Signed: Albert Chau MD    9/27/2024 6:32 PM EDT    Workstation ID: [id number]              Impression:   Sepsis- manifested by fever, tachycardia, leukopenia, thrombocytopenia, elevated procalcitonin, elevated  transaminases with unclear etiology.  She has a risk factor for exposure to ticks with pets that go outside.   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  Nausea and vomiting with CT scan that showed some free pelvic fluid that may be c/w ruptured ovarian cyst.  Leukopenia, improved  Thrombocytopenia  Transaminitis-significantly worse.  Her bilirubin is now mildly elevated.  Her viral hepatitis panel was negative.  Her CMV serology was negative.  I will  order EBV and CMV PCR studies.  Elevated procalcitonin  Coagulopathy.      PLAN/RECOMMENDATIONS:   Follow blood cultures--negative to date.  Follow tick-borne work up pending.  Lyme EIA negative.  Follow STI work up, EBV, CMV--negative. Monospot negative.   EBV and CMV PCR studies  Discontinue Zosyn  Continue IV Doxycycline for empiric coverage for tick-borne illness  Avoid acetaminophen  Continue N-acetylcysteine protocol  Markers for autoimmune disease    Ozzy Givens MD saw and examined patient, verified hx and PE, read all radiographic studies, reviewed labs and micro data, and formulated dx, plan for treatment and all medical decision making.      Hay Waters PA-C for Ozzy Givens MD    I discussed her complex situation with Dr. Brunner in detail today.  Her high complexity medical problems required high complexity medical decision making today.    Ozzy Givens MD  9/29/2024  16:52 EDT

## 2024-09-29 NOTE — PROGRESS NOTES
[facility] Medicine Services  PROGRESS NOTE    Patient Name: Madalyn Alexandre  : 2000  MRN: [id number]    Date of Admission: 2024  Primary Care Physician: Provider, No Known    Subjective   Subjective     CC: fever    HPI:  Had a fever around 4-5 pm yesterday (103F) but has since defervesced.  She still complains of some nausea but is generally feeling a bit better. Significant other at bedside.     Objective   Objective     Vital Signs:   Temp:  [98.8 °F (37.1 °C)-103.1 °F (39.5 °C)] 99.3 °F (37.4 °C)  Heart Rate:  [] 78  Resp:  [16-20] 18  BP: ()/(74-88) 104/88     Physical Exam:  Constitutional: No acute distress, awake, alert  HENT: NCAT, mucous membranes moist  Respiratory: Clear to auscultation bilaterally, respiratory effort normal   Cardiovascular: RRR, no murmurs, rubs, or gallops  Gastrointestinal: Positive bowel sounds, soft, nontender, nondistended  Musculoskeletal: No bilateral ankle edema  Psychiatric: Appropriate affect, cooperative  Neurologic: Oriented x 3, strength symmetric in all extremities, Cranial Nerves grossly intact to confrontation, speech clear  Skin: No rashes   Results Reviewed:  LAB RESULTS:      Lab 24  0640 24  2225 24  1801   WBC 3.42 2.89*  --  2.54*   HEMOGLOBIN 13.6 13.5  --  14.0   HEMATOCRIT 39.4 39.7  --  40.9   PLATELETS 133* 123*  --  132*   NEUTROS ABS 2.77 2.40  --  2.07   IMMATURE GRANS (ABS)  --   --   --  0.01   LYMPHS ABS  --   --   --  0.31*   MONOS ABS  --   --   --  0.13   EOS ABS 0.00 0.00  --  0.01   MCV 92.1 93.2  --  93.0   PROCALCITONIN  --  0.96*  --  0.69*   LACTATE  --   --   --  0.8   PROTIME 16.0*  --  16.6*  --          Lab 2440 09/28/24  0718 09/27/24  1801   SODIUM 135* 137 136   POTASSIUM 3.3* 3.7 3.6   CHLORIDE 100 103 102   CO2 22.0 23.0 24.0   ANION GAP 13.0 11.0 10.0   BUN 4* 5* 6   CREATININE 0.75 0.72 0.77   EGFR 112.8 118.4 109.3   GLUCOSE 92 77 117*    CALCIUM 8.0* 8.3* 8.7   MAGNESIUM  --  2.4  --          Lab 09/29/24  0640 09/28/24  0718 09/27/24  1801   TOTAL PROTEIN 6.5 6.1 6.9   ALBUMIN 3.5 3.6 4.0   GLOBULIN 3.0 2.5 2.9   ALT (SGPT) 2,972* 917* 614*   AST (SGOT) 3,202* 869* 614*   BILIRUBIN 2.1* 1.0 0.6   ALK PHOS 251* 178* 203*   LIPASE  --   --  25         Lab 09/29/24  0640 09/27/24  2225   PROTIME 16.0* 16.6*   INR 1.27* 1.33*                 Brief Urine Lab Results  (Last result in the past 365 days)        Color   Clarity   Blood   Leuk Est   Nitrite   Protein   CREAT   Urine HCG        09/28/24 1825               Negative               Microbiology Results Abnormal       Procedure Component Value - Date/Time    Urine Culture - Urine, Urine, Clean Catch [[id number]] Collected: 09/27/24 1728    Lab Status: Final result Specimen: Urine, Clean Catch Updated: 09/29/24 0941     Urine Culture >100,000 CFU/mL Mixed Beena Isolated    Narrative:      Specimen contains mixed organisms of questionable pathogenicity suggestive of contamination. If symptoms persist, suggest recollection.  Colonization of the urinary tract without infection is common. Treatment is discouraged unless the patient is symptomatic, pregnant, or undergoing an invasive urologic procedure.    Blood Culture - Blood, Wrist, Right [[id number]]  (Normal) Collected: 09/27/24 2039    Lab Status: Preliminary result Specimen: Blood from Wrist, Right Updated: 09/28/24 2132     Blood Culture No growth at 24 hours    Blood Culture - Blood, Arm, Right [[id number]]  (Normal) Collected: 09/27/24 1801    Lab Status: Preliminary result Specimen: Blood from Arm, Right Updated: 09/28/24 1916     Blood Culture No growth at 24 hours    MRSA Screen, PCR (Inpatient) - Swab, Nares [[id number]]  (Normal) Collected: 09/27/24 2250    Lab Status: Final result Specimen: Swab from Nares Updated: 09/28/24 0906     MRSA PCR Negative    Narrative:      The negative predictive value of this diagnostic test is high and  should only be used to consider de-escalating anti-MRSA therapy. A positive result may indicate colonization with MRSA and must be correlated clinically.  MRSA Negative    Respiratory Panel PCR w/COVID-19(SARS-CoV-2) ALBERTO/ALIN/HAKAN/PAD/COR/RASHI In-House, NP Swab in UTM/VTM, 2 HR TAT - Swab, Nasopharynx [[id number]]  (Normal) Collected: 09/27/24 1728    Lab Status: Final result Specimen: Swab from Nasopharynx Updated: 09/27/24 2200     ADENOVIRUS, PCR Not Detected     Coronavirus 229E Not Detected     Coronavirus HKU1 Not Detected     Coronavirus NL63 Not Detected     Coronavirus OC43 Not Detected     COVID19 Not Detected     Human Metapneumovirus Not Detected     Human Rhinovirus/Enterovirus Not Detected     Influenza A PCR Not Detected     Influenza B PCR Not Detected     Parainfluenza Virus 1 Not Detected     Parainfluenza Virus 2 Not Detected     Parainfluenza Virus 3 Not Detected     Parainfluenza Virus 4 Not Detected     RSV, PCR Not Detected     Bordetella pertussis pcr Not Detected     Bordetella parapertussis PCR Not Detected     Chlamydophila pneumoniae PCR Not Detected     Mycoplasma pneumo by PCR Not Detected    Narrative:      In the setting of a positive respiratory panel with a viral infection PLUS a negative procalcitonin without other underlying concern for bacterial infection, consider observing off antibiotics or discontinuation of antibiotics and continue supportive care. If the respiratory panel is positive for atypical bacterial infection (Bordetella pertussis, Chlamydophila pneumoniae, or Mycoplasma pneumoniae), consider antibiotic de-escalation to target atypical bacterial infection.    COVID PRE-OP / PRE-PROCEDURE SCREENING ORDER (NO ISOLATION) - Swab, Nasopharynx [[id number]]  (Normal) Collected: 09/27/24 1728    Lab Status: Final result Specimen: Swab from Nasopharynx Updated: 09/27/24 1847    Narrative:      The following orders were created for panel order COVID PRE-OP / PRE-PROCEDURE SCREENING  ORDER (NO ISOLATION) - Swab, Nasopharynx.  Procedure                               Abnormality         Status                     ---------                               -----------         ------                     COVID-19 and FLU A/B PCR...[[id number]]  Normal              Final result                 Please view results for these tests on the individual orders.    COVID-19 and FLU A/B PCR, 1 HR TAT - Swab, Nasopharynx [[id number]]  (Normal) Collected: 09/27/24 1728    Lab Status: Final result Specimen: Swab from Nasopharynx Updated: 09/27/24 1847     COVID19 Not Detected     Influenza A PCR Not Detected     Influenza B PCR Not Detected    Narrative:      Fact sheet for providers: https://www.fda.gov/media/308147/download    Fact sheet for patients: https://www.fda.gov/media/879275/download    Test performed by PCR.            US Liver    Result Date: 9/28/2024  US LIVER Date of Exam: 9/28/2024 5:34 AM EDT Indication: elevated liver enzymes. Comparison: CT abdomen and pelvis 9/27/2024 Technique: Grayscale and color Doppler ultrasound evaluation of the right upper quadrant was performed. Findings: Visualized portions of the pancreas appear normal. Normal appearance of the liver. No focal liver lesion. The portal vein is patent with hepatopetal flow. The middle hepatic vein is patent with normal waveform. Normal appearance of the gallbladder. Normal appearance of the common bile duct measuring 0.2 cm in diameter. Normal appearance of the right kidney.     Impression: Impression: Normal right upper quadrant ultrasound. Electronically Signed: John Gallardo MD  9/28/2024 1:33 PM EDT  Workstation ID: [id number]    MRI Lumbar Spine With & Without Contrast    Result Date: 9/27/2024  MRI LUMBAR SPINE W WO CONTRAST Date of Exam: 9/27/2024 6:43 PM EDT Indication: sepsis, midline lumbar spine pain, eval for infectious process.  Comparison: None available. Technique:  Routine multiplanar/multisequence sequence images of the  lumbar spine were obtained before and after the uneventful administration of Multihance.  Findings: There is normal height, alignment, and signal intensity of the lumbar vertebral bodies. Spinal cord terminates at the L1 level with normal signal seen within the conus. Visualized paraspinal soft tissues appear within normal limits. Postcontrast images demonstrate no pathologic contrast enhancement. Axial images demonstrate: L1/2: No significant disc bulge. Facet joints are within normal limits. No spinal canal stenosis or neural foraminal narrowing. L2/3: No significant disc bulge. Facet joints appear within normal limits. No spinal canal stenosis or neural foraminal narrowing. L3/4: No significant disc bulge. Facet joints are within normal limits. No spinal canal stenosis or neural foraminal narrowing. L4/5: No significant disc bulge. There are mild degenerative facet changes bilaterally. No spinal canal stenosis or neural foraminal narrowing. 5/S1: No significant disc bulge. There are mild degenerative facet changes left greater than right. No spinal canal stenosis or neural foraminal narrowing.     Impression: Impression: 1. Mild degenerative facet change at the L4/5 and L5/S1 levels. There is no focal disc protrusion, spinal canal stenosis, or neural foraminal narrowing identified. Electronically Signed: Sal Pacheco MD  9/27/2024 7:42 PM EDT  Workstation ID: [id number]    CT Abdomen Pelvis With Contrast    Result Date: 9/27/2024  CT ABDOMEN PELVIS W CONTRAST Date of Exam: 9/27/2024 6:10 PM EDT Indication: low back pain, fever, tachycardia. Comparison: None available. Technique: Axial CT images were obtained of the abdomen and pelvis following the uneventful intravenous administration of Isovue-300, 97 mL. Reconstructed coronal and sagittal images were also obtained. Automated exposure control and iterative construction methods were used. Findings: Lung Bases: The visualized lung bases and lower mediastinal  structures are unremarkable. Peritoneum: No free intraperitoneal air is visualized. Small to moderate volume free fluid is visualized in the pelvis. Abdominal wall: Unremarkable. Liver: Liver is normal in size and contour. No focal lesions. Biliary/Gallbladder: The gallbladder is normal without evidence of radiopaque gallstones. The biliary tree is nondilated. Pancreas: Pancreas is within normal limits. There is no evidence of pancreatic mass or peripancreatic inflammatory changes. Spleen: Spleen is normal in size and contour. Gastrointestinal/Mesentery: No evidence of bowel obstruction or gross inflammatory changes. The appendix appears within normal limits.  Adrenals: Adrenal glands are unremarkable. Kidneys: The kidneys are in anatomic position. No evidence of nephrolithiasis. No evidence of hydronephrosis or significant perinephric fat stranding. Bladder: The urinary bladder is unremarkable. Reproductive organs:  The uterus and ovaries appear within millimeters on CT. Lymph Nodes: No significant adenopathy is identified. Vasculature: Unremarkable. Osseous Structures:  No acute fracture or aggressive lesions.     Impression: Impression: Small to moderate volume free fluid in the pelvis. Finding may be physiologic and possibly secondary to recently ruptured ovarian cyst however, in the setting of fever or signs of sepsis, underlying infectious process not excluded. Clinically correlate. Otherwise, unremarkable contrast-enhanced CT of the abdomen and pelvis. Electronically Signed: Albert Chau MD  9/27/2024 6:32 PM EDT  Workstation ID: [id number]         Current medications:  Scheduled Meds:doxycycline, 100 mg, Intravenous, Q12H  phytonadione (AQUA-MEPHYTON, VITAMIN K) 10 mg in sodium chloride 0.9 % 50 mL IVPB, 10 mg, Intravenous, Once  piperacillin-tazobactam, 4.5 g, Intravenous, Q8H  sodium chloride, 10 mL, Intravenous, Q12H      Continuous Infusions:acetylcysteine (ACETADOTE) 6000 mg in D5W infusion (NON-APAP  LIVER FAILURE) Third Dose, 6.25 mg/kg/hr  sodium chloride, 100 mL/hr, Last Rate: 100 mL/hr (09/29/24 0846)      PRN Meds:.  acetaminophen    senna-docusate sodium **AND** polyethylene glycol **AND** bisacodyl **AND** bisacodyl    ondansetron    sodium chloride    sodium chloride    Assessment & Plan   Assessment & Plan     Active Hospital Problems    Diagnosis  POA    **Sepsis [A41.9]  Yes    Elevated liver enzymes [R74.8]  Yes    Acute bilateral low back pain without sciatica [M54.50]  Yes    Elevated prolactin level [R79.89]  Yes      Resolved Hospital Problems   No resolved problems to display.        Brief Hospital Course to date:  Madalyn Alexandre is a 24 y.o. female with no significant PMH other than STI (Chlamydia) in 2020 who presented with fever of unknown etiology. She was found to have fever on admission with elevated transaminases and thrombocytopenia.    Plan:    SIRS: Leukopenia, fever, tachycardia, elevated procalcitonin.    - IV fluid bolus given in the ED, continue gentle IV fluid  - CBC in the a.m.  - Vancomycin and Zosyn started in the ED, stopped Vanc given negative MRSA PCR.  ID added on Doxycycline   - BC x 2 pending  - Urine culture pending  - ID consulted, appreciate Dr. Givens   - Monospot, HIV negative  - CBC, CMP in the a.m.  -- Patient with pelvic exam in the ED, later informed apparently swab was not sent to lab, patient does have previous history of Chlamydia, ? Repeat pelvic exam however discussed with Dr. 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 Brunner's assistance  - CMP daily  -- LFTs worsening and now tBili slightly elevated. INR improved this am  -- checking for tick borne illnesses as noted above     Low back pain  - MRI negative for acute findings  - Tylenol as needed      Total time spent: Time Spent: Time Spent: 35 minutes  Time spent includes time reviewing chart, face-to-face time, counseling patient/family/caregiver, ordering medications/tests/procedures, communicating with other health care professionals, documenting clinical information in the electronic health record, and coordination of care.    Expected Discharge Location and Transportation: home  Expected Discharge   Expected Discharge Date: 10/1/2024; Expected Discharge Time:      VTE Prophylaxis:  Mechanical VTE prophylaxis orders are present.         AM-PAC 6 Clicks Score (PT): 24 (09/29/24 0800)    CODE STATUS:   Code Status and Medical Interventions: CPR (Attempt to Resuscitate); Full Support   Ordered at: 09/27/24 5314     Code Status (Patient has no pulse and is not breathing):    CPR (Attempt to Resuscitate)     Medical Interventions (Patient has pulse or is breathing):    Full Support       Ellie Baumann MD  09/29/24

## 2024-09-30 VITALS
SYSTOLIC BLOOD PRESSURE: 108 MMHG | DIASTOLIC BLOOD PRESSURE: 78 MMHG | HEART RATE: 78 BPM | HEIGHT: 60 IN | WEIGHT: 121.6 LBS | OXYGEN SATURATION: 96 % | RESPIRATION RATE: 16 BRPM | TEMPERATURE: 99.1 F | BODY MASS INDEX: 23.87 KG/M2

## 2024-09-30 LAB
ALBUMIN SERPL-MCNC: 3.5 G/DL (ref 3.5–5.2)
ALBUMIN/GLOB SERPL: 1.3 G/DL
ALP SERPL-CCNC: 228 U/L (ref 39–117)
ALT SERPL W P-5'-P-CCNC: 5626 U/L (ref 1–33)
ANION GAP SERPL CALCULATED.3IONS-SCNC: 16 MMOL/L (ref 5–15)
AST SERPL-CCNC: 6391 U/L (ref 1–32)
BASOPHILS # BLD MANUAL: 0 10*3/MM3 (ref 0–0.2)
BASOPHILS NFR BLD MANUAL: 0 % (ref 0–1.5)
BILIRUB SERPL-MCNC: 2.8 MG/DL (ref 0–1.2)
BUN SERPL-MCNC: 4 MG/DL (ref 6–20)
BUN/CREAT SERPL: 6.7 (ref 7–25)
C TRACH RRNA SPEC QL NAA+PROBE: NEGATIVE
CALCIUM SPEC-SCNC: 8.2 MG/DL (ref 8.6–10.5)
CENTROMERE B AB SER-ACNC: <0.2 AI (ref 0–0.9)
CHLORIDE SERPL-SCNC: 96 MMOL/L (ref 98–107)
CHROMATIN AB SERPL-ACNC: <0.2 AI (ref 0–0.9)
CO2 SERPL-SCNC: 21 MMOL/L (ref 22–29)
CREAT SERPL-MCNC: 0.6 MG/DL (ref 0.57–1)
DEPRECATED RDW RBC AUTO: 40.8 FL (ref 37–54)
DSDNA AB SER-ACNC: 1 IU/ML (ref 0–9)
EBV NA IGG SER IA-ACNC: 393 U/ML (ref 0–17.9)
EBV VCA IGG SER IA-ACNC: 213 U/ML (ref 0–17.9)
EBV VCA IGM SER IA-ACNC: <36 U/ML (ref 0–35.9)
EGFRCR SERPLBLD CKD-EPI 2021: 127.1 ML/MIN/1.73
ENA JO1 AB SER-ACNC: <0.2 AI (ref 0–0.9)
ENA RNP AB SER-ACNC: <0.2 AI (ref 0–0.9)
ENA SCL70 AB SER-ACNC: <0.2 AI (ref 0–0.9)
ENA SM AB SER-ACNC: <0.2 AI (ref 0–0.9)
ENA SS-A AB SER-ACNC: <0.2 AI (ref 0–0.9)
ENA SS-B AB SER-ACNC: <0.2 AI (ref 0–0.9)
EOSINOPHIL # BLD MANUAL: 0 10*3/MM3 (ref 0–0.4)
EOSINOPHIL NFR BLD MANUAL: 0 % (ref 0.3–6.2)
ERYTHROCYTE [DISTWIDTH] IN BLOOD BY AUTOMATED COUNT: 12 % (ref 12.3–15.4)
GLOBULIN UR ELPH-MCNC: 2.6 GM/DL
GLUCOSE SERPL-MCNC: 108 MG/DL (ref 65–99)
HCT VFR BLD AUTO: 37.4 % (ref 34–46.6)
HGB BLD-MCNC: 13 G/DL (ref 12–15.9)
INR PPP: 1.49 (ref 0.89–1.12)
LYMPHOCYTES # BLD MANUAL: 0.86 10*3/MM3 (ref 0.7–3.1)
LYMPHOCYTES NFR BLD MANUAL: 14 % (ref 5–12)
Lab: NORMAL
MCH RBC QN AUTO: 31.9 PG (ref 26.6–33)
MCHC RBC AUTO-ENTMCNC: 34.8 G/DL (ref 31.5–35.7)
MCV RBC AUTO: 91.7 FL (ref 79–97)
MONOCYTES # BLD: 0.57 10*3/MM3 (ref 0.1–0.9)
N GONORRHOEA RRNA SPEC QL NAA+PROBE: NEGATIVE
NEUTROPHILS # BLD AUTO: 2.67 10*3/MM3 (ref 1.7–7)
NEUTROPHILS NFR BLD MANUAL: 49 % (ref 42.7–76)
NEUTS BAND NFR BLD MANUAL: 16 % (ref 0–5)
PLAT MORPH BLD: NORMAL
PLATELET # BLD AUTO: 118 10*3/MM3 (ref 140–450)
PMV BLD AUTO: 11.9 FL (ref 6–12)
POTASSIUM SERPL-SCNC: 3.5 MMOL/L (ref 3.5–5.2)
PROT SERPL-MCNC: 6.1 G/DL (ref 6–8.5)
PROTHROMBIN TIME: 18.1 SECONDS (ref 12.2–14.5)
RBC # BLD AUTO: 4.08 10*6/MM3 (ref 3.77–5.28)
RBC MORPH BLD: NORMAL
SCAN SLIDE: NORMAL
SERVICE CMNT-IMP: ABNORMAL
SODIUM SERPL-SCNC: 133 MMOL/L (ref 136–145)
T VAGINALIS RRNA SPEC QL NAA+PROBE: NEGATIVE
VARIANT LYMPHS NFR BLD MANUAL: 21 % (ref 19.6–45.3)
WBC MORPH BLD: NORMAL
WBC NRBC COR # BLD AUTO: 4.1 10*3/MM3 (ref 3.4–10.8)

## 2024-09-30 PROCEDURE — 25810000003 SODIUM CHLORIDE 0.9 % SOLUTION: Performed by: NURSE PRACTITIONER

## 2024-09-30 PROCEDURE — 25010000002 ONDANSETRON PER 1 MG: Performed by: NURSE PRACTITIONER

## 2024-09-30 PROCEDURE — 80053 COMPREHEN METABOLIC PANEL: CPT | Performed by: INTERNAL MEDICINE

## 2024-09-30 PROCEDURE — 25010000002 ACETYLCYSTEINE PER 100 MG: Performed by: INTERNAL MEDICINE

## 2024-09-30 PROCEDURE — 86665 EPSTEIN-BARR CAPSID VCA: CPT | Performed by: INTERNAL MEDICINE

## 2024-09-30 PROCEDURE — 87517 HEPATITIS B DNA QUANT: CPT | Performed by: INTERNAL MEDICINE

## 2024-09-30 PROCEDURE — 99239 HOSP IP/OBS DSCHRG MGMT >30: CPT | Performed by: INTERNAL MEDICINE

## 2024-09-30 PROCEDURE — 99232 SBSQ HOSP IP/OBS MODERATE 35: CPT | Performed by: INTERNAL MEDICINE

## 2024-09-30 PROCEDURE — 0 DEXTROSE 5 % SOLUTION 1,000 ML FLEX CONT: Performed by: INTERNAL MEDICINE

## 2024-09-30 PROCEDURE — 85025 COMPLETE CBC W/AUTO DIFF WBC: CPT | Performed by: INTERNAL MEDICINE

## 2024-09-30 PROCEDURE — 85610 PROTHROMBIN TIME: CPT | Performed by: INTERNAL MEDICINE

## 2024-09-30 PROCEDURE — 85007 BL SMEAR W/DIFF WBC COUNT: CPT | Performed by: INTERNAL MEDICINE

## 2024-09-30 PROCEDURE — 86664 EPSTEIN-BARR NUCLEAR ANTIGEN: CPT | Performed by: INTERNAL MEDICINE

## 2024-09-30 PROCEDURE — 87040 BLOOD CULTURE FOR BACTERIA: CPT | Performed by: NURSE PRACTITIONER

## 2024-09-30 RX ORDER — PROCHLORPERAZINE EDISYLATE 5 MG/ML
5 INJECTION INTRAMUSCULAR; INTRAVENOUS EVERY 6 HOURS PRN
Start: 2024-09-30

## 2024-09-30 RX ORDER — ONDANSETRON 2 MG/ML
4 INJECTION INTRAMUSCULAR; INTRAVENOUS EVERY 6 HOURS PRN
Start: 2024-09-30

## 2024-09-30 RX ORDER — IBUPROFEN 400 MG/1
400 TABLET, FILM COATED ORAL ONCE
Status: COMPLETED | OUTPATIENT
Start: 2024-09-30 | End: 2024-09-30

## 2024-09-30 RX ADMIN — ONDANSETRON 4 MG: 2 INJECTION INTRAMUSCULAR; INTRAVENOUS at 13:50

## 2024-09-30 RX ADMIN — Medication 10 ML: at 08:35

## 2024-09-30 RX ADMIN — IBUPROFEN 400 MG: 400 TABLET, FILM COATED ORAL at 08:34

## 2024-09-30 RX ADMIN — DOXYCYCLINE 100 MG: 100 INJECTION, POWDER, LYOPHILIZED, FOR SOLUTION INTRAVENOUS at 06:14

## 2024-09-30 RX ADMIN — SODIUM CHLORIDE 100 ML/HR: 9 INJECTION, SOLUTION INTRAVENOUS at 08:37

## 2024-09-30 RX ADMIN — ACETYLCYSTEINE 6.25 MG/KG/HR: 6 INJECTION, SOLUTION INTRAVENOUS at 13:47

## 2024-09-30 NOTE — NURSING NOTE
GI on call paged around 0315. Dr. Baumann, on call GI provider returned page.  Provider contacted in order to follow up on patient's continuous fever and further enzyme elevation this morning with AM Labs. On call hospitalist paged around midnight and charge nurse was advised to then consult with GI on call provider regarding an appropriate antipyretic.  Patient's liver enzymes had increased more with early morning labs this morning and GI on call advised to hold  ibuprofen and follow up. Patient's temperature decreased to 100.7 around 0600 with the use of ice and cooling but increased again prior to shift change this AM. Dr. Brunner paged and returned call. He stated the 1x order for motrin was ok to give for now. Day shift nurse informed and stated she would give the medicine, now timed for 0800, and would follow up as needed. Pt not currently in acute distress, on  96% on RA, SR on the monitor and normotensive and resting comfortably.

## 2024-09-30 NOTE — CASE MANAGEMENT/SOCIAL WORK
Continued Stay Note  Psychiatric     Patient Name: Madalyn Alexandre  MRN: [id number]  Today's Date: 9/30/2024    Admit Date: 9/27/2024    Plan: transfer to    Discharge Plan       Row Name 09/30/24 1551       Plan    Plan transfer to     Patient/Family in Agreement with Plan yes    Final Discharge Disposition Code 02 - [facility] for  care    Final Note I spoke with this patient regarding her transfer to [facility] 7E unit rm 721 per the bedside RN. She is in agreement. The  EMS has been scheduled through [facility], and the PCS is completed and placed in the drop box. Per Dr Baumann, she does not require cardiac monitoring or drips during transport. MD and RN were notified that EMS will be calling the unit directly for the ETA of transport. She denies having any further discharge planning.                   Discharge Codes    No documentation.                 Expected Discharge Date and Time       Expected Discharge Date Expected Discharge Time    Oct 1, 2024               Jammie Wallace RN

## 2024-09-30 NOTE — CASE MANAGEMENT/SOCIAL WORK
Discharge Planning Assessment  [facility]     Patient Name: Madalyn Alexandre  MRN: [id number]  Today's Date: 9/30/2024    Admit Date: 9/27/2024    Plan: home   Discharge Needs Assessment       Row Name 09/30/24 1012       Living Environment    People in Home significant other    Current Living Arrangements apartment    Primary Care Provided by self       Transition Planning    Patient/Family Anticipates Transition to home       Discharge Needs Assessment    Readmission Within the Last 30 Days no previous admission in last 30 days    Equipment Currently Used at Home none    Concerns to be Addressed basic needs;discharge planning                   Discharge Plan       Row Name 09/30/24 1013       Plan    Plan home    Patient/Family in Agreement with Plan yes    Plan Comments I met with this patient bedside. She lives with her SO in [facility]. She is indepenedent with activities of daily living and mobility. She anticipates returning home after this hospitalization, and her SO can transport. Case management will follow.    Final Discharge Disposition Code 01 - home or self-care                  Continued Care and Services - Admitted Since 9/27/2024    No active coordination exists for this encounter.       Expected Discharge Date and Time       Expected Discharge Date Expected Discharge Time    Oct 1, 2024            Demographic Summary       Row Name 09/30/24 1011       General Information    General Information Comments I confirmed that Ms Alexandre goes to a PCP at the [facility] and she has Jose PRATT                   Functional Status       Row Name 09/30/24 1012       Functional Status, IADL    Medications independent    Meal Preparation independent    Housekeeping independent    Laundry independent    Shopping independent                   Psychosocial    No documentation.                  Abuse/Neglect    No documentation.                  Legal    No documentation.                   Substance Abuse    No documentation.                  Patient Forms    No documentation.                     Jammie Wallace RN

## 2024-09-30 NOTE — DISCHARGE SUMMARY
[facility] Medicine Services  DISCHARGE SUMMARY    Patient Name: Madalyn Alexandre  : 2000  MRN: [id number]    Date of Admission: 2024  5:06 PM  Date of Discharge:  2024  Primary Care Physician: Provider, No Known    Consults       Date and Time Order Name Status Description    2024  1:23 PM Inpatient Infectious Diseases Consult Completed     2024  3:55 AM Inpatient Obstetrics / Gynecology Consult      2024  9:53 PM Inpatient Gastroenterology Consult Completed             Hospital Course     Presenting Problem: sepsis    Active Hospital Problems    Diagnosis  POA    **Sepsis [A41.9]  Yes    Elevated liver enzymes [R74.8]  Yes    Acute bilateral low back pain without sciatica [M54.50]  Yes    Elevated prolactin level [R79.89]  Yes      Resolved Hospital Problems   No resolved problems to display.          Hospital Course:  Madalyn Alexandre is a 24 y.o. female with no significant PMH other than STI (Chlamydia) in  who presented with fever of unknown etiology. She was found to have fever on admission with elevated transaminases and thrombocytopenia. At this time, leading diagnosis is tick borne illness, however, pt continues to have fevers and LFTs have continued to worsen.  Given her worsening liver function, feel it is better for her to be at a transplant center for closer monitoring and I have spoken with  who have accepted her for transfer.        Sepsis of unclear etiology: Leukopenia, fever, tachycardia, elevated procalcitonin.    - IV fluid bolus given in the ED, continue gentle IV fluid  - Vancomycin and Zosyn started in the ED, stopped Vanc given negative MRSA PCR.  ID added on Doxycycline   - BC x 2 NGTD  - Urine culture NGTD  - ID consulted, appreciate Dr. Givens   - Monospot, HIV negative  - CBC, CMP in the a.m.  -- Patient with pelvic exam in the ED, later informed apparently swab was not sent to lab, patient does have previous history of  Chlamydia, ? Repeat pelvic exam however discussed with Dr. Espino who did Pelvic exam, no discharge, no tenderness, cervix appeared normal   -- Free fluid is noted on CT abd/pelvis though, consider possible GYN consult but no abdominal pain or complaints on exam at this time as thought to possibly be secondary to recently ruptured ovarian cyst   -- urine GC/chlamydia PENDING  -- need to rule out tick borne illness given below, sent for Ehrlichia, lyme, etc- no tick exposure per patient but she does have a dog who is outside frequently per ID   -- continues to be febrile and unable to tolerate acetaminophen and/or much ibuprofen due to below.  Okay for one time dose of Motrin this am per GI.  Continue ice packs, cooling blankets as needed.       Acutely elevated liver enzymes  Coagulopathy   Thrombocytopenia (mild)  - Acute hep panel negative  - CT abdomen/pelvis negative for acute findings besides some small to moderate free fluid in pelvis as above   - Liver ultrasound unremarkable   - GI consulted, appreciate Dr. Brunner's assistance  -- LFTs continue to worsen to 6400/5000 AST/ALT this am and tbili worse. INR also now worse at 1.49.    -- discussed with Dr. Brunner and advised transfer to a facility with transplant services available.  I called over to  and discussed with Dr. Eliceo Lutz (Internal Medicine) who accepted the patient.  -- duplex ordered to evaluate for Budd-Chiari, etc.   -- checking for tick borne illnesses as noted above, EBV/CMV PENDING   -- of note, no offending medications/toxin exposures (pt denies any supplement use other than biotin and collagen. She denies any OCP/hormonal therapies).      Low back pain  - MRI negative for acute findings  - Tylenol now on hold due to above         Discharge Follow Up Recommendations for outpatient labs/diagnostics:   Transferring to  Hospital when bed available.  Accepting MD, Dr. Rajan Isaacs.     Day of Discharge     HPI:   Feels okay today,  still febrile overnight as she was unable to get NSAIDs or Tylenol.  Still nauseated at times as well.     Review of Systems  Gen-  chills  CV- No chest pain, palpitations  Resp- No cough, dyspnea  GI- + Nausea, no diarrhea, +constipation x 2 days, mild abd pain      Vital Signs:   Temp:  [99.6 °F (37.6 °C)-102.7 °F (39.3 °C)] 100.1 °F (37.8 °C)  Heart Rate:  [62-87] 84  Resp:  [16-18] 16  BP: (104-127)/(64-83) 104/67      Physical Exam:  Constitutional: No acute distress, awake, alert  HENT: NCAT, mucous membranes moist  Respiratory: Clear to auscultation bilaterally, respiratory effort normal   Cardiovascular: RRR, no murmurs, rubs, or gallops  Gastrointestinal: Positive bowel sounds, soft, nontender, nondistended  Musculoskeletal: No bilateral ankle edema  Psychiatric: Appropriate affect, cooperative  Neurologic: Oriented x 3, strength symmetric in all extremities, Cranial Nerves grossly intact to confrontation, speech clear  Skin: No rashes     Pertinent  and/or Most Recent Results     LAB RESULTS:      Lab 09/30/24  0041 09/29/24  0640 09/28/24  0718 09/27/24  2225 09/27/24  1801   WBC 4.10 3.42 2.89*  --  2.54*   HEMOGLOBIN 13.0 13.6 13.5  --  14.0   HEMATOCRIT 37.4 39.4 39.7  --  40.9   PLATELETS 118* 133* 123*  --  132*   NEUTROS ABS 2.67 2.77 2.40  --  2.07   IMMATURE GRANS (ABS)  --   --   --   --  0.01   LYMPHS ABS  --   --   --   --  0.31*   MONOS ABS  --   --   --   --  0.13   EOS ABS 0.00 0.00 0.00  --  0.01   MCV 91.7 92.1 93.2  --  93.0   PROCALCITONIN  --   --  0.96*  --  0.69*   LACTATE  --   --   --   --  0.8   PROTIME 18.1* 16.0*  --  16.6*  --          Lab 09/30/24  0041 09/29/24  0640 09/28/24  0718 09/27/24  1801   SODIUM 133* 135* 137 136   POTASSIUM 3.5 3.3* 3.7 3.6   CHLORIDE 96* 100 103 102   CO2 21.0* 22.0 23.0 24.0   ANION GAP 16.0* 13.0 11.0 10.0   BUN 4* 4* 5* 6   CREATININE 0.60 0.75 0.72 0.77   EGFR 127.1 112.8 118.4 109.3   GLUCOSE 108* 92 77 117*   CALCIUM 8.2* 8.0* 8.3* 8.7    MAGNESIUM  --   --  2.4  --          Lab 09/30/24  0041 09/29/24  0640 09/28/24  0718 09/27/24  1801   TOTAL PROTEIN 6.1 6.5 6.1 6.9   ALBUMIN 3.5 3.5 3.6 4.0   GLOBULIN 2.6 3.0 2.5 2.9   ALT (SGPT) 5,626* 2,972* 917* 614*   AST (SGOT) 6,391* 3,202* 869* 614*   BILIRUBIN 2.8* 2.1* 1.0 0.6   ALK PHOS 228* 251* 178* 203*   LIPASE  --   --   --  25         Lab 09/30/24  0041 09/29/24  0640 09/27/24  2225   PROTIME 18.1* 16.0* 16.6*   INR 1.49* 1.27* 1.33*                 Brief Urine Lab Results  (Last result in the past 365 days)        Color   Clarity   Blood   Leuk Est   Nitrite   Protein   CREAT   Urine HCG        09/28/24 1825               Negative             Microbiology Results (last 10 days)       Procedure Component Value - Date/Time    MRSA Screen, PCR (Inpatient) - Swab, Nares [[id number]]  (Normal) Collected: 09/27/24 2250    Lab Status: Final result Specimen: Swab from Nares Updated: 09/28/24 0906     MRSA PCR Negative    Narrative:      The negative predictive value of this diagnostic test is high and should only be used to consider de-escalating anti-MRSA therapy. A positive result may indicate colonization with MRSA and must be correlated clinically.  MRSA Negative    Blood Culture - Blood, Wrist, Right [[id number]]  (Normal) Collected: 09/27/24 2039    Lab Status: Preliminary result Specimen: Blood from Wrist, Right Updated: 09/29/24 2131     Blood Culture No growth at 2 days    Blood Culture - Blood, Arm, Right [[id number]]  (Normal) Collected: 09/27/24 1801    Lab Status: Preliminary result Specimen: Blood from Arm, Right Updated: 09/29/24 1916     Blood Culture No growth at 2 days    COVID PRE-OP / PRE-PROCEDURE SCREENING ORDER (NO ISOLATION) - Swab, Nasopharynx [[id number]]  (Normal) Collected: 09/27/24 1728    Lab Status: Final result Specimen: Swab from Nasopharynx Updated: 09/27/24 5703    Narrative:      The following orders were created for panel order COVID PRE-OP / PRE-PROCEDURE SCREENING  ORDER (NO ISOLATION) - Swab, Nasopharynx.  Procedure                               Abnormality         Status                     ---------                               -----------         ------                     COVID-19 and FLU A/B PCR...[[id number]]  Normal              Final result                 Please view results for these tests on the individual orders.    COVID-19 and FLU A/B PCR, 1 HR TAT - Swab, Nasopharynx [[id number]]  (Normal) Collected: 09/27/24 1728    Lab Status: Final result Specimen: Swab from Nasopharynx Updated: 09/27/24 1847     COVID19 Not Detected     Influenza A PCR Not Detected     Influenza B PCR Not Detected    Narrative:      Fact sheet for providers: https://www.fda.gov/media/328257/download    Fact sheet for patients: https://www.fda.gov/media/180393/download    Test performed by PCR.    Urine Culture - Urine, Urine, Clean Catch [[id number]] Collected: 09/27/24 1728    Lab Status: Final result Specimen: Urine, Clean Catch Updated: 09/29/24 0941     Urine Culture >100,000 CFU/mL Mixed Beena Isolated    Narrative:      Specimen contains mixed organisms of questionable pathogenicity suggestive of contamination. If symptoms persist, suggest recollection.  Colonization of the urinary tract without infection is common. Treatment is discouraged unless the patient is symptomatic, pregnant, or undergoing an invasive urologic procedure.    Respiratory Panel PCR w/COVID-19(SARS-CoV-2) ALBERTO/[facility]/HAKAN/PAD/COR/RASHI In-House, NP Swab in UTM/VTM, 2 HR TAT - Swab, Nasopharynx [[id number]]  (Normal) Collected: 09/27/24 1728    Lab Status: Final result Specimen: Swab from Nasopharynx Updated: 09/27/24 2200     ADENOVIRUS, PCR Not Detected     Coronavirus 229E Not Detected     Coronavirus HKU1 Not Detected     Coronavirus NL63 Not Detected     Coronavirus OC43 Not Detected     COVID19 Not Detected     Human Metapneumovirus Not Detected     Human Rhinovirus/Enterovirus Not Detected     Influenza A PCR Not  Detected     Influenza B PCR Not Detected     Parainfluenza Virus 1 Not Detected     Parainfluenza Virus 2 Not Detected     Parainfluenza Virus 3 Not Detected     Parainfluenza Virus 4 Not Detected     RSV, PCR Not Detected     Bordetella pertussis pcr Not Detected     Bordetella parapertussis PCR Not Detected     Chlamydophila pneumoniae PCR Not Detected     Mycoplasma pneumo by PCR Not Detected    Narrative:      In the setting of a positive respiratory panel with a viral infection PLUS a negative procalcitonin without other underlying concern for bacterial infection, consider observing off antibiotics or discontinuation of antibiotics and continue supportive care. If the respiratory panel is positive for atypical bacterial infection (Bordetella pertussis, Chlamydophila pneumoniae, or Mycoplasma pneumoniae), consider antibiotic de-escalation to target atypical bacterial infection.            US Liver    Result Date: 9/28/2024  US LIVER Date of Exam: 9/28/2024 5:34 AM EDT Indication: elevated liver enzymes. Comparison: CT abdomen and pelvis 9/27/2024 Technique: Grayscale and color Doppler ultrasound evaluation of the right upper quadrant was performed. Findings: Visualized portions of the pancreas appear normal. Normal appearance of the liver. No focal liver lesion. The portal vein is patent with hepatopetal flow. The middle hepatic vein is patent with normal waveform. Normal appearance of the gallbladder. Normal appearance of the common bile duct measuring 0.2 cm in diameter. Normal appearance of the right kidney.     Impression: Normal right upper quadrant ultrasound. Electronically Signed: John Gallardo MD  9/28/2024 1:33 PM EDT  Workstation ID: [id number]    MRI Lumbar Spine With & Without Contrast    Result Date: 9/27/2024  MRI LUMBAR SPINE W WO CONTRAST Date of Exam: 9/27/2024 6:43 PM EDT Indication: sepsis, midline lumbar spine pain, eval for infectious process.  Comparison: None available. Technique:   Routine multiplanar/multisequence sequence images of the lumbar spine were obtained before and after the uneventful administration of Multihance.  Findings: There is normal height, alignment, and signal intensity of the lumbar vertebral bodies. Spinal cord terminates at the L1 level with normal signal seen within the conus. Visualized paraspinal soft tissues appear within normal limits. Postcontrast images demonstrate no pathologic contrast enhancement. Axial images demonstrate: L1/2: No significant disc bulge. Facet joints are within normal limits. No spinal canal stenosis or neural foraminal narrowing. L2/3: No significant disc bulge. Facet joints appear within normal limits. No spinal canal stenosis or neural foraminal narrowing. L3/4: No significant disc bulge. Facet joints are within normal limits. No spinal canal stenosis or neural foraminal narrowing. L4/5: No significant disc bulge. There are mild degenerative facet changes bilaterally. No spinal canal stenosis or neural foraminal narrowing. 5/S1: No significant disc bulge. There are mild degenerative facet changes left greater than right. No spinal canal stenosis or neural foraminal narrowing.     Impression: 1. Mild degenerative facet change at the L4/5 and L5/S1 levels. There is no focal disc protrusion, spinal canal stenosis, or neural foraminal narrowing identified. Electronically Signed: Sal Pacheco MD  9/27/2024 7:42 PM EDT  Workstation ID: [id number]    CT Abdomen Pelvis With Contrast    Result Date: 9/27/2024  CT ABDOMEN PELVIS W CONTRAST Date of Exam: 9/27/2024 6:10 PM EDT Indication: low back pain, fever, tachycardia. Comparison: None available. Technique: Axial CT images were obtained of the abdomen and pelvis following the uneventful intravenous administration of Isovue-300, 97 mL. Reconstructed coronal and sagittal images were also obtained. Automated exposure control and iterative construction methods were used. Findings: Lung Bases: The  visualized lung bases and lower mediastinal structures are unremarkable. Peritoneum: No free intraperitoneal air is visualized. Small to moderate volume free fluid is visualized in the pelvis. Abdominal wall: Unremarkable. Liver: Liver is normal in size and contour. No focal lesions. Biliary/Gallbladder: The gallbladder is normal without evidence of radiopaque gallstones. The biliary tree is nondilated. Pancreas: Pancreas is within normal limits. There is no evidence of pancreatic mass or peripancreatic inflammatory changes. Spleen: Spleen is normal in size and contour. Gastrointestinal/Mesentery: No evidence of bowel obstruction or gross inflammatory changes. The appendix appears within normal limits.  Adrenals: Adrenal glands are unremarkable. Kidneys: The kidneys are in anatomic position. No evidence of nephrolithiasis. No evidence of hydronephrosis or significant perinephric fat stranding. Bladder: The urinary bladder is unremarkable. Reproductive organs:  The uterus and ovaries appear within millimeters on CT. Lymph Nodes: No significant adenopathy is identified. Vasculature: Unremarkable. Osseous Structures:  No acute fracture or aggressive lesions.     Impression: Small to moderate volume free fluid in the pelvis. Finding may be physiologic and possibly secondary to recently ruptured ovarian cyst however, in the setting of fever or signs of sepsis, underlying infectious process not excluded. Clinically correlate. Otherwise, unremarkable contrast-enhanced CT of the abdomen and pelvis. Electronically Signed: Albert Chau MD  9/27/2024 6:32 PM EDT  Workstation ID: [id number]                 Plan for Follow-up of Pending Labs/Results:   Pending Labs       Order Current Status    Anti-Smooth Muscle Antibody Titer In process    Blood Culture - Blood, Hand, Right In process    Blood Culture - Blood, Hand, Right In process    CMV DNA, Quantitative, PCR In process    Chlamydia trachomatis, Neisseria gonorrhoeae,  Trichomonas vaginalis, PCR - Urine, Urine, Clean Catch In process    EBV Antibody Profile In process    Ehrlichia Profile DNA PCR In process    Ileana-Barr Virus, Quant In process    Hepatitis B DNA, Quantitative, PCR In process    Mitochondrial Antibodies, M2 In process    Protein Elec + Interp, Serum In process    Rickettsia Species DNA, Real-Time PCR In process    Blood Culture - Blood, Arm, Right Preliminary result    Blood Culture - Blood, Wrist, Right Preliminary result          Discharge Details        Discharge Medications        New Medications        Instructions Start Date   dextrose 5 % solution 1,000 mL with acetylcysteine 200 MG/ML solution 6,000 mg   6.25 mg/kg/hr (345 mg/hr), Intravenous, Continuous      doxycycline 100 mg in sodium chloride 0.9 % 100 mL IVPB   100 mg, Intravenous, Every 12 Hours             Stop These Medications      cyclobenzaprine 10 MG tablet  Commonly known as: FLEXERIL     diclofenac 75 MG EC tablet  Commonly known as: VOLTAREN              Allergies   Allergen Reactions    Vancomycin Itching     Infusion rate related         Discharge Disposition:      Diet: Regular House Diet  Hospital:           Activity:      Restrictions or Other Recommendations:         CODE STATUS:    Code Status and Medical Interventions: CPR (Attempt to Resuscitate); Full Support   Ordered at: 09/27/24 2148     Code Status (Patient has no pulse and is not breathing):    CPR (Attempt to Resuscitate)     Medical Interventions (Patient has pulse or is breathing):    Full Support       Future Appointments   Date Time Provider Department Center   5/29/2025  3:30 PM Margaux Gallardo APRN [facility]  [facility]                 Ellie Baumann MD  09/30/24      Time Spent on Discharge:  I spent  35  minutes on this discharge activity which included: face-to-face encounter with the patient, reviewing the data in the system, coordination of the care with the nursing staff as well as consultants, documentation,  and entering orders.

## 2024-09-30 NOTE — PLAN OF CARE
Goal Outcome Evaluation:  Plan of Care Reviewed With: patient        Progress: no change  Outcome Evaluation: VSS. RA. Alert and oriented x4. No complaints of pain. Pt complaints of nausea. PRN's given. Pt being transferred to [facility] room 721. Report called to RN . pt being transferred via  EMS. resting comfortably. no further complaints at this time.    Ok to discharge with both IV's in place per MD.

## 2024-09-30 NOTE — PAYOR COMM NOTE
"Mae Phillips RN  [facility]  Utilization Management  P:[phone]  F:[phone]    Auth # [id number]   Fax 2 of 2    Madalyn Madrigal \"Rosalva\" (24 y.o. Female)       Date of Birth   2000    Social Security Number       Address   [address]  [address] [place] [address]    Home Phone   [phone]    MRN   [id number]       Congregation   Unknown    Marital Status   Single                            Admission Date   24    Admission Type   Emergency    Admitting Provider   Ellie Baumann MD    Attending Provider   Ellie Baumann MD    Department, Room/Bed   [facility] 2F, S212/       Discharge Date       Discharge Disposition       Discharge Destination                                 Attending Provider: Ellie Baumann MD    Allergies: Vancomycin    Isolation: None   Infection: None   Code Status: CPR    Ht: 152.4 cm (60\")   Wt: 55.2 kg (121 lb 9.6 oz)    Admission Cmt: None   Principal Problem: Sepsis [A41.9]                   Active Insurance as of 2024       Primary Coverage       Payor Plan Insurance Group Employer/Plan Group    [employer]HE[employer] BLUE CROSS AN[employer] BLUE CROSS BLUE SHIELD PPO [id number]       Payor Plan Address Payor Plan Phone Number Payor Plan Fax Number Effective Dates    [address] [phone]  2024 - None Entered    [place] [address]         Subscriber Name Subscriber Birth Date Member ID       MADALYN MADRIGAL 2000 [id number]                     Emergency Contacts        (Rel.) Home Phone Work Phone Mobile Phone    scott madrigal (Mother) [phone] -- [phone]                 Physician Progress Notes (all)        Ellie Baumann MD at 24 0853              [facility] Medicine Services  PROGRESS NOTE    Patient Name: Madalyn Madrigal  : 2000  MRN: [id number]    Date of Admission: 2024  Primary Care Physician: Provider, No Known    Subjective "   Subjective     CC: fever    HPI:  Feels okay today, still febrile overnight as she was unable to get NSAIDs or Tylenol.  Still nauseated at times as well.        Objective   Objective     Vital Signs:   Temp:  [99.1 °F (37.3 °C)-102.7 °F (39.3 °C)] 102 °F (38.9 °C)  Heart Rate:  [60-88] 87  Resp:  [16-18] 16  BP: (117-127)/(64-83) 127/83     Physical Exam:  Constitutional: No acute distress, awake, alert  HENT: NCAT, mucous membranes moist  Respiratory: Clear to auscultation bilaterally, respiratory effort normal   Cardiovascular: RRR, no murmurs, rubs, or gallops  Gastrointestinal: Positive bowel sounds, soft, nontender, nondistended  Musculoskeletal: No bilateral ankle edema  Psychiatric: Appropriate affect, cooperative  Neurologic: Oriented x 3, strength symmetric in all extremities, Cranial Nerves grossly intact to confrontation, speech clear  Skin: No rashes   Results Reviewed:  LAB RESULTS:      Lab 09/30/24 0041 09/29/24  0640 09/28/24  0718 09/27/24  2225 09/27/24  1801   WBC 4.10 3.42 2.89*  --  2.54*   HEMOGLOBIN 13.0 13.6 13.5  --  14.0   HEMATOCRIT 37.4 39.4 39.7  --  40.9   PLATELETS 118* 133* 123*  --  132*   NEUTROS ABS 2.67 2.77 2.40  --  2.07   IMMATURE GRANS (ABS)  --   --   --   --  0.01   LYMPHS ABS  --   --   --   --  0.31*   MONOS ABS  --   --   --   --  0.13   EOS ABS 0.00 0.00 0.00  --  0.01   MCV 91.7 92.1 93.2  --  93.0   PROCALCITONIN  --   --  0.96*  --  0.69*   LACTATE  --   --   --   --  0.8   PROTIME 18.1* 16.0*  --  16.6*  --          Lab 09/30/24  0041 09/29/24  0640 09/28/24  0718 09/27/24  1801   SODIUM 133* 135* 137 136   POTASSIUM 3.5 3.3* 3.7 3.6   CHLORIDE 96* 100 103 102   CO2 21.0* 22.0 23.0 24.0   ANION GAP 16.0* 13.0 11.0 10.0   BUN 4* 4* 5* 6   CREATININE 0.60 0.75 0.72 0.77   EGFR 127.1 112.8 118.4 109.3   GLUCOSE 108* 92 77 117*   CALCIUM 8.2* 8.0* 8.3* 8.7   MAGNESIUM  --   --  2.4  --          Lab 09/30/24  0041 09/29/24  0640 09/28/24  0718 09/27/24  1801   TOTAL  PROTEIN 6.1 6.5 6.1 6.9   ALBUMIN 3.5 3.5 3.6 4.0   GLOBULIN 2.6 3.0 2.5 2.9   ALT (SGPT) 5,626* 2,972* 917* 614*   AST (SGOT) 6,391* 3,202* 869* 614*   BILIRUBIN 2.8* 2.1* 1.0 0.6   ALK PHOS 228* 251* 178* 203*   LIPASE  --   --   --  25         Lab 09/30/24  0041 09/29/24  0640 09/27/24  2225   PROTIME 18.1* 16.0* 16.6*   INR 1.49* 1.27* 1.33*                 Brief Urine Lab Results  (Last result in the past 365 days)        Color   Clarity   Blood   Leuk Est   Nitrite   Protein   CREAT   Urine HCG        09/28/24 1825               Negative               Microbiology Results Abnormal       Procedure Component Value - Date/Time    Blood Culture - Blood, Wrist, Right [[id number]]  (Normal) Collected: 09/27/24 2039    Lab Status: Preliminary result Specimen: Blood from Wrist, Right Updated: 09/29/24 2131     Blood Culture No growth at 2 days    Blood Culture - Blood, Arm, Right [[id number]]  (Normal) Collected: 09/27/24 1801    Lab Status: Preliminary result Specimen: Blood from Arm, Right Updated: 09/29/24 1916     Blood Culture No growth at 2 days    Urine Culture - Urine, Urine, Clean Catch [[id number]] Collected: 09/27/24 1728    Lab Status: Final result Specimen: Urine, Clean Catch Updated: 09/29/24 0941     Urine Culture >100,000 CFU/mL Mixed Beena Isolated    Narrative:      Specimen contains mixed organisms of questionable pathogenicity suggestive of contamination. If symptoms persist, suggest recollection.  Colonization of the urinary tract without infection is common. Treatment is discouraged unless the patient is symptomatic, pregnant, or undergoing an invasive urologic procedure.    MRSA Screen, PCR (Inpatient) - Swab, Nares [[id number]]  (Normal) Collected: 09/27/24 2250    Lab Status: Final result Specimen: Swab from Nares Updated: 09/28/24 0906     MRSA PCR Negative    Narrative:      The negative predictive value of this diagnostic test is high and should only be used to consider de-escalating anti-MRSA  therapy. A positive result may indicate colonization with MRSA and must be correlated clinically.  MRSA Negative    Respiratory Panel PCR w/COVID-19(SARS-CoV-2) ALBERTO/ALIN/HAKAN/PAD/COR/RASHI In-House, NP Swab in UTM/VTM, 2 HR TAT - Swab, Nasopharynx [[id number]]  (Normal) Collected: 09/27/24 1728    Lab Status: Final result Specimen: Swab from Nasopharynx Updated: 09/27/24 2200     ADENOVIRUS, PCR Not Detected     Coronavirus 229E Not Detected     Coronavirus HKU1 Not Detected     Coronavirus NL63 Not Detected     Coronavirus OC43 Not Detected     COVID19 Not Detected     Human Metapneumovirus Not Detected     Human Rhinovirus/Enterovirus Not Detected     Influenza A PCR Not Detected     Influenza B PCR Not Detected     Parainfluenza Virus 1 Not Detected     Parainfluenza Virus 2 Not Detected     Parainfluenza Virus 3 Not Detected     Parainfluenza Virus 4 Not Detected     RSV, PCR Not Detected     Bordetella pertussis pcr Not Detected     Bordetella parapertussis PCR Not Detected     Chlamydophila pneumoniae PCR Not Detected     Mycoplasma pneumo by PCR Not Detected    Narrative:      In the setting of a positive respiratory panel with a viral infection PLUS a negative procalcitonin without other underlying concern for bacterial infection, consider observing off antibiotics or discontinuation of antibiotics and continue supportive care. If the respiratory panel is positive for atypical bacterial infection (Bordetella pertussis, Chlamydophila pneumoniae, or Mycoplasma pneumoniae), consider antibiotic de-escalation to target atypical bacterial infection.    COVID PRE-OP / PRE-PROCEDURE SCREENING ORDER (NO ISOLATION) - Swab, Nasopharynx [[id number]]  (Normal) Collected: 09/27/24 1728    Lab Status: Final result Specimen: Swab from Nasopharynx Updated: 09/27/24 1847    Narrative:      The following orders were created for panel order COVID PRE-OP / PRE-PROCEDURE SCREENING ORDER (NO ISOLATION) - Swab, Nasopharynx.  Procedure                                Abnormality         Status                     ---------                               -----------         ------                     COVID-19 and FLU A/B PCR...[[id number]]  Normal              Final result                 Please view results for these tests on the individual orders.    COVID-19 and FLU A/B PCR, 1 HR TAT - Swab, Nasopharynx [[id number]]  (Normal) Collected: 09/27/24 1728    Lab Status: Final result Specimen: Swab from Nasopharynx Updated: 09/27/24 1847     COVID19 Not Detected     Influenza A PCR Not Detected     Influenza B PCR Not Detected    Narrative:      Fact sheet for providers: https://www.fda.gov/media/278131/download    Fact sheet for patients: https://www.fda.gov/media/334883/download    Test performed by PCR.            No radiology results from the last 24 hrs        Current medications:  Scheduled Meds:doxycycline, 100 mg, Intravenous, Q12H  sodium chloride, 10 mL, Intravenous, Q12H      Continuous Infusions:acetylcysteine (ACETADOTE) 6000 mg in D5W infusion (NON-APAP LIVER FAILURE) Third Dose, 6.25 mg/kg/hr, Last Rate: 6.25 mg/kg/hr (09/29/24 1616)  sodium chloride, 100 mL/hr, Last Rate: 100 mL/hr (09/30/24 0837)      PRN Meds:.  senna-docusate sodium **AND** polyethylene glycol **AND** bisacodyl **AND** bisacodyl    ondansetron    prochlorperazine    sodium chloride    sodium chloride    Assessment & Plan   Assessment & Plan     Active Hospital Problems    Diagnosis  POA    **Sepsis [A41.9]  Yes    Elevated liver enzymes [R74.8]  Yes    Acute bilateral low back pain without sciatica [M54.50]  Yes    Elevated prolactin level [R79.89]  Yes      Resolved Hospital Problems   No resolved problems to display.        Brief Hospital Course to date:  Madalyn Alexandre is a 24 y.o. female with no significant PMH other than STI (Chlamydia) in 2020 who presented with fever of unknown etiology. She was found to have fever on admission with elevated transaminases and  thrombocytopenia. At this time, leading diagnosis is tick borne illness, however, pt continues to have fevers and LFTs have continued to worsen.  Given her worsening liver function, feel it is better for her to be at a transplant center for closer monitoring and I have spoken with  who have accepted her for transfer.         Sepsis of unclear etiology: Leukopenia, fever, tachycardia, elevated procalcitonin.    - IV fluid bolus given in the ED, continue gentle IV fluid  - Vancomycin and Zosyn started in the ED, stopped Vanc given negative MRSA PCR.  ID added on Doxycycline   - BC x 2 NGTD  - Urine culture NGTD  - ID consulted, appreciate Dr. Givens   - Monospot, HIV negative  - CBC, CMP in the a.m.  -- Patient with pelvic exam in the ED, later informed apparently swab was not sent to lab, patient does have previous history of Chlamydia, ? Repeat pelvic exam however discussed with Dr. Espino who did Pelvic exam, no discharge, no tenderness, cervix appeared normal   -- Free fluid is noted on CT abd/pelvis though, consider possible GYN consult but no abdominal pain or complaints on exam at this time as thought to possibly be secondary to recently ruptured ovarian cyst   -- urine GC/chlamydia PENDING  -- need to rule out tick borne illness given below, sent for Ehrlichia, lyme, etc- no tick exposure per patient but she does have a dog who is outside frequently per ID   -- continues to be febrile and unable to tolerate acetaminophen and/or much ibuprofen due to below.  Okay for one time dose of Motrin this am per GI.  Continue ice packs, cooling blankets as needed.       Acutely elevated liver enzymes  Coagulopathy   Thrombocytopenia (mild)  - Acute hep panel negative  - CT abdomen/pelvis negative for acute findings besides some small to moderate free fluid in pelvis as above   - Liver ultrasound unremarkable   - GI consulted, appreciate Dr. Brunner's assistance  -- LFTs continue to worsen to 6400/5000 AST/ALT this am  and tbili worse. INR also now worse at 1.49.    -- discussed with Dr. Brunner and advised transfer to a facility with transplant services available.  I called over to  and discussed with Dr. Eliceo Lutz (Internal Medicine) who accepted the patient.  -- duplex ordered to evaluate for Budd-Chiari, etc.   -- checking for tick borne illnesses as noted above, EBV/CMV PENDING   -- of note, no offending medications/toxin exposures (pt denies any supplement use other than biotin and collagen. She denies any OCP/hormonal therapies).      Low back pain  - MRI negative for acute findings  - Tylenol now on hold due to above       Total time spent: Time Spent: Time Spent: 50 minutes  Time spent includes time reviewing chart, face-to-face time, counseling patient/family/caregiver, ordering medications/tests/procedures, communicating with other health care professionals, documenting clinical information in the electronic health record, and coordination of care.    Expected Discharge Location and Transportation: transfer to  when bed available   Expected Discharge   Expected Discharge Date: 10/1/2024; Expected Discharge Time:      VTE Prophylaxis:  Mechanical VTE prophylaxis orders are present.         AM-PAC 6 Clicks Score (PT): 24 (09/29/24 2000)    CODE STATUS:   Code Status and Medical Interventions: CPR (Attempt to Resuscitate); Full Support   Ordered at: 09/27/24 1607     Code Status (Patient has no pulse and is not breathing):    CPR (Attempt to Resuscitate)     Medical Interventions (Patient has pulse or is breathing):    Full Support       Ellie Baumann MD  09/30/24       Electronically signed by Ellie Baumann MD at 09/30/24 5953       Brunner, Mark I, MD at 09/29/24 1353          GI Daily Progress Note  Subjective:    Chief Complaint: Follow-up acute hepatitis    Patient's back pain is significantly improved.  She has poor appetite today and also notes fatigue.  No abdominal pain or nausea.  High  "fever yesterday evening to 103.1.    Objective:    /76 (BP Location: Right arm, Patient Position: Lying)   Pulse 60   Temp 99.1 °F (37.3 °C) (Oral)   Resp 18   Ht 152.4 cm (60\")   Wt 55.2 kg (121 lb 9.6 oz)   LMP 09/10/2024 (Exact Date)   SpO2 99%   BMI 23.75 kg/m²     Physical Exam  Constitutional:       General: She is not in acute distress.     Appearance: She is ill-appearing. She is not toxic-appearing or diaphoretic.   HENT:      Head: Normocephalic.      Nose: No congestion.      Mouth/Throat:      Mouth: Mucous membranes are moist.   Eyes:      General: No scleral icterus.     Conjunctiva/sclera: Conjunctivae normal.   Cardiovascular:      Rate and Rhythm: Normal rate.      Pulses: Normal pulses.   Pulmonary:      Effort: Pulmonary effort is normal. No respiratory distress.   Abdominal:      General: Bowel sounds are normal. There is no distension.      Palpations: Abdomen is soft.      Tenderness: There is no abdominal tenderness. There is no guarding.   Musculoskeletal:      Cervical back: Normal range of motion.      Right lower leg: No edema.      Left lower leg: No edema.   Skin:     General: Skin is warm and dry.      Coloration: Skin is not jaundiced.   Neurological:      General: No focal deficit present.      Mental Status: She is alert and oriented to person, place, and time.   Psychiatric:         Mood and Affect: Mood normal.         Behavior: Behavior normal.     Lab  Lab Results   Component Value Date    WBC 3.42 09/29/2024    HGB 13.6 09/29/2024    HGB 13.5 09/28/2024    HGB 14.0 09/27/2024    MCV 92.1 09/29/2024     (L) 09/29/2024    INR 1.27 (H) 09/29/2024    INR 1.33 (H) 09/27/2024     Lab Results   Component Value Date    GLUCOSE 92 09/29/2024    BUN 4 (L) 09/29/2024    CREATININE 0.75 09/29/2024    BCR 5.3 (L) 09/29/2024     (L) 09/29/2024    K 3.3 (L) 09/29/2024    CO2 22.0 09/29/2024    CALCIUM 8.0 (L) 09/29/2024    ALBUMIN 3.5 09/29/2024    ALKPHOS 251 (H) " 09/29/2024    BILITOT 2.1 (H) 09/29/2024    ALT 2,972 (H) 09/29/2024    AST 3,202 (H) 09/29/2024      Latest Reference Range & Units 09/28/24 07:18   Creatine Kinase 20 - 180 U/L 57      Latest Reference Range & Units 09/28/24 07:18   Ammonia 11 - 51 umol/L 10 (L)   Ceruloplasmin 19 - 39 mg/dL 24      Latest Reference Range & Units 09/27/24 20:39   CMV IgG 0.00 - 0.59 U/mL <0.60   CMV IgM 0.0 - 29.9 AU/mL <30.0      Latest Reference Range & Units 09/28/24 18:25   Amphetamine, Urine Qual Negative  Negative   Barbiturates Screen, Urine Negative  Negative   Benzodiazepine Screen, Urine Negative  Negative   Buprenorphine, Screen, Urine Negative  Negative   Cocaine Screen, Urine Negative  Negative   Fentanyl, Urine Negative  Negative   Methamphetamine, Ur Negative  Negative   Methadone Screen , Urine Negative  Negative   Opiate Screen Negative  Negative   Oxycodone Screen, Urine Negative  Negative   Phencyclidine (PCP), Urine Negative  Negative   THC Screen, Urine Negative  Negative   Tricyclic Antidepressants Screen Negative  Negative     Assessment:    Acute marked transaminase elevation, worsening. Now with mild bilirubin elevation. On NAC protocol for possible Tylenol toxicity and possible impending liver failure. DILI from naproxen is not felt likely. Favor infectious etiology of hepatitis. ID consultant is covering tick-born diseases, pending serology. Liver vasculature appeared normal on RUQ duplex US.  Hepatitis panel, CMV serology, and Monospot negative.  Ceruloplasmin, CK, and drug screen are negative.  Autoimmune studies pending.  Fever, improving throughout today, but seems to spike each evening around 5 PM.  Back pain, improving. MRI spine unremarkable. No evidence for pyelonephritis on CT. Urine culture: mixed acosta (contaminated).  Leukopenia with left shift and absolute lymphocytopenia, slightly improved.  Thrombocytopenia, persistent.  Mild coagulopathy, stable. Not responsive to vitamin  K.    Plan:    >> Avoid naproxen.  >> Continue N-acetylcysteine protocol.  >> Awaiting autoimmune markers.  >> Awaiting EBV serology.  >> Repeat LFTs and PT/INR tomorrow.  >> If bilirubin or INR significantly worsen, recommend referral to liver transplant center.    Mark I. Brunner, MD  24  13:53 EDT      Electronically signed by Brunner, Mark I, MD at 24 1607       Ellie Baumann MD at 24 1119              [facility] Medicine Services  PROGRESS NOTE    Patient Name: Madalyn Alexandre  : 2000  MRN: [id number]    Date of Admission: 2024  Primary Care Physician: Provider, No Known    Subjective   Subjective     CC: fever    HPI:  Had a fever around 4-5 pm yesterday (103F) but has since defervesced.  She still complains of some nausea but is generally feeling a bit better. Significant other at bedside.     Objective   Objective     Vital Signs:   Temp:  [98.8 °F (37.1 °C)-103.1 °F (39.5 °C)] 99.3 °F (37.4 °C)  Heart Rate:  [] 78  Resp:  [16-20] 18  BP: ()/(74-88) 104/88     Physical Exam:  Constitutional: No acute distress, awake, alert  HENT: NCAT, mucous membranes moist  Respiratory: Clear to auscultation bilaterally, respiratory effort normal   Cardiovascular: RRR, no murmurs, rubs, or gallops  Gastrointestinal: Positive bowel sounds, soft, nontender, nondistended  Musculoskeletal: No bilateral ankle edema  Psychiatric: Appropriate affect, cooperative  Neurologic: Oriented x 3, strength symmetric in all extremities, Cranial Nerves grossly intact to confrontation, speech clear  Skin: No rashes   Results Reviewed:  LAB RESULTS:      Lab 24  0640 24  0718 24  2225 24  1801   WBC 3.42 2.89*  --  2.54*   HEMOGLOBIN 13.6 13.5  --  14.0   HEMATOCRIT 39.4 39.7  --  40.9   PLATELETS 133* 123*  --  132*   NEUTROS ABS 2.77 2.40  --  2.07   IMMATURE GRANS (ABS)  --   --   --  0.01   LYMPHS ABS  --   --   --  0.31*   MONOS ABS  --   --    --  0.13   EOS ABS 0.00 0.00  --  0.01   MCV 92.1 93.2  --  93.0   PROCALCITONIN  --  0.96*  --  0.69*   LACTATE  --   --   --  0.8   PROTIME 16.0*  --  16.6*  --          Lab 09/29/24  0640 09/28/24  0718 09/27/24  1801   SODIUM 135* 137 136   POTASSIUM 3.3* 3.7 3.6   CHLORIDE 100 103 102   CO2 22.0 23.0 24.0   ANION GAP 13.0 11.0 10.0   BUN 4* 5* 6   CREATININE 0.75 0.72 0.77   EGFR 112.8 118.4 109.3   GLUCOSE 92 77 117*   CALCIUM 8.0* 8.3* 8.7   MAGNESIUM  --  2.4  --          Lab 09/29/24  0640 09/28/24  0718 09/27/24  1801   TOTAL PROTEIN 6.5 6.1 6.9   ALBUMIN 3.5 3.6 4.0   GLOBULIN 3.0 2.5 2.9   ALT (SGPT) 2,972* 917* 614*   AST (SGOT) 3,202* 869* 614*   BILIRUBIN 2.1* 1.0 0.6   ALK PHOS 251* 178* 203*   LIPASE  --   --  25         Lab 09/29/24  0640 09/27/24  2225   PROTIME 16.0* 16.6*   INR 1.27* 1.33*                 Brief Urine Lab Results  (Last result in the past 365 days)        Color   Clarity   Blood   Leuk Est   Nitrite   Protein   CREAT   Urine HCG        09/28/24 1825               Negative               Microbiology Results Abnormal       Procedure Component Value - Date/Time    Urine Culture - Urine, Urine, Clean Catch [[id number]] Collected: 09/27/24 1728    Lab Status: Final result Specimen: Urine, Clean Catch Updated: 09/29/24 0941     Urine Culture >100,000 CFU/mL Mixed Beena Isolated    Narrative:      Specimen contains mixed organisms of questionable pathogenicity suggestive of contamination. If symptoms persist, suggest recollection.  Colonization of the urinary tract without infection is common. Treatment is discouraged unless the patient is symptomatic, pregnant, or undergoing an invasive urologic procedure.    Blood Culture - Blood, Wrist, Right [[id number]]  (Normal) Collected: 09/27/24 2039    Lab Status: Preliminary result Specimen: Blood from Wrist, Right Updated: 09/28/24 2132     Blood Culture No growth at 24 hours    Blood Culture - Blood, Arm, Right [[id number]]  (Normal)  Collected: 09/27/24 1801    Lab Status: Preliminary result Specimen: Blood from Arm, Right Updated: 09/28/24 1916     Blood Culture No growth at 24 hours    MRSA Screen, PCR (Inpatient) - Swab, Nares [[id number]]  (Normal) Collected: 09/27/24 2250    Lab Status: Final result Specimen: Swab from Nares Updated: 09/28/24 0906     MRSA PCR Negative    Narrative:      The negative predictive value of this diagnostic test is high and should only be used to consider de-escalating anti-MRSA therapy. A positive result may indicate colonization with MRSA and must be correlated clinically.  MRSA Negative    Respiratory Panel PCR w/COVID-19(SARS-CoV-2) ALBERTO/ALIN/HAKAN/PAD/COR/RASHI In-House, NP Swab in UTM/VTM, 2 HR TAT - Swab, Nasopharynx [[id number]]  (Normal) Collected: 09/27/24 1728    Lab Status: Final result Specimen: Swab from Nasopharynx Updated: 09/27/24 2200     ADENOVIRUS, PCR Not Detected     Coronavirus 229E Not Detected     Coronavirus HKU1 Not Detected     Coronavirus NL63 Not Detected     Coronavirus OC43 Not Detected     COVID19 Not Detected     Human Metapneumovirus Not Detected     Human Rhinovirus/Enterovirus Not Detected     Influenza A PCR Not Detected     Influenza B PCR Not Detected     Parainfluenza Virus 1 Not Detected     Parainfluenza Virus 2 Not Detected     Parainfluenza Virus 3 Not Detected     Parainfluenza Virus 4 Not Detected     RSV, PCR Not Detected     Bordetella pertussis pcr Not Detected     Bordetella parapertussis PCR Not Detected     Chlamydophila pneumoniae PCR Not Detected     Mycoplasma pneumo by PCR Not Detected    Narrative:      In the setting of a positive respiratory panel with a viral infection PLUS a negative procalcitonin without other underlying concern for bacterial infection, consider observing off antibiotics or discontinuation of antibiotics and continue supportive care. If the respiratory panel is positive for atypical bacterial infection (Bordetella pertussis, Chlamydophila  pneumoniae, or Mycoplasma pneumoniae), consider antibiotic de-escalation to target atypical bacterial infection.    COVID PRE-OP / PRE-PROCEDURE SCREENING ORDER (NO ISOLATION) - Swab, Nasopharynx [[id number]]  (Normal) Collected: 09/27/24 1728    Lab Status: Final result Specimen: Swab from Nasopharynx Updated: 09/27/24 1847    Narrative:      The following orders were created for panel order COVID PRE-OP / PRE-PROCEDURE SCREENING ORDER (NO ISOLATION) - Swab, Nasopharynx.  Procedure                               Abnormality         Status                     ---------                               -----------         ------                     COVID-19 and FLU A/B PCR...[[id number]]  Normal              Final result                 Please view results for these tests on the individual orders.    COVID-19 and FLU A/B PCR, 1 HR TAT - Swab, Nasopharynx [[id number]]  (Normal) Collected: 09/27/24 1728    Lab Status: Final result Specimen: Swab from Nasopharynx Updated: 09/27/24 1847     COVID19 Not Detected     Influenza A PCR Not Detected     Influenza B PCR Not Detected    Narrative:      Fact sheet for providers: https://www.fda.gov/media/341707/download    Fact sheet for patients: https://www.fda.gov/media/822897/download    Test performed by PCR.            US Liver    Result Date: 9/28/2024  US LIVER Date of Exam: 9/28/2024 5:34 AM EDT Indication: elevated liver enzymes. Comparison: CT abdomen and pelvis 9/27/2024 Technique: Grayscale and color Doppler ultrasound evaluation of the right upper quadrant was performed. Findings: Visualized portions of the pancreas appear normal. Normal appearance of the liver. No focal liver lesion. The portal vein is patent with hepatopetal flow. The middle hepatic vein is patent with normal waveform. Normal appearance of the gallbladder. Normal appearance of the common bile duct measuring 0.2 cm in diameter. Normal appearance of the right kidney.     Impression: Impression: Normal  right upper quadrant ultrasound. Electronically Signed: John Gallardo MD  9/28/2024 1:33 PM EDT  Workstation ID: [id number]    MRI Lumbar Spine With & Without Contrast    Result Date: 9/27/2024  MRI LUMBAR SPINE W WO CONTRAST Date of Exam: 9/27/2024 6:43 PM EDT Indication: sepsis, midline lumbar spine pain, eval for infectious process.  Comparison: None available. Technique:  Routine multiplanar/multisequence sequence images of the lumbar spine were obtained before and after the uneventful administration of Multihance.  Findings: There is normal height, alignment, and signal intensity of the lumbar vertebral bodies. Spinal cord terminates at the L1 level with normal signal seen within the conus. Visualized paraspinal soft tissues appear within normal limits. Postcontrast images demonstrate no pathologic contrast enhancement. Axial images demonstrate: L1/2: No significant disc bulge. Facet joints are within normal limits. No spinal canal stenosis or neural foraminal narrowing. L2/3: No significant disc bulge. Facet joints appear within normal limits. No spinal canal stenosis or neural foraminal narrowing. L3/4: No significant disc bulge. Facet joints are within normal limits. No spinal canal stenosis or neural foraminal narrowing. L4/5: No significant disc bulge. There are mild degenerative facet changes bilaterally. No spinal canal stenosis or neural foraminal narrowing. 5/S1: No significant disc bulge. There are mild degenerative facet changes left greater than right. No spinal canal stenosis or neural foraminal narrowing.     Impression: Impression: 1. Mild degenerative facet change at the L4/5 and L5/S1 levels. There is no focal disc protrusion, spinal canal stenosis, or neural foraminal narrowing identified. Electronically Signed: Sal Pacheco MD  9/27/2024 7:42 PM EDT  Workstation ID: [id number]    CT Abdomen Pelvis With Contrast    Result Date: 9/27/2024  CT ABDOMEN PELVIS W CONTRAST Date of Exam: 9/27/2024  6:10 PM EDT Indication: low back pain, fever, tachycardia. Comparison: None available. Technique: Axial CT images were obtained of the abdomen and pelvis following the uneventful intravenous administration of Isovue-300, 97 mL. Reconstructed coronal and sagittal images were also obtained. Automated exposure control and iterative construction methods were used. Findings: Lung Bases: The visualized lung bases and lower mediastinal structures are unremarkable. Peritoneum: No free intraperitoneal air is visualized. Small to moderate volume free fluid is visualized in the pelvis. Abdominal wall: Unremarkable. Liver: Liver is normal in size and contour. No focal lesions. Biliary/Gallbladder: The gallbladder is normal without evidence of radiopaque gallstones. The biliary tree is nondilated. Pancreas: Pancreas is within normal limits. There is no evidence of pancreatic mass or peripancreatic inflammatory changes. Spleen: Spleen is normal in size and contour. Gastrointestinal/Mesentery: No evidence of bowel obstruction or gross inflammatory changes. The appendix appears within normal limits.  Adrenals: Adrenal glands are unremarkable. Kidneys: The kidneys are in anatomic position. No evidence of nephrolithiasis. No evidence of hydronephrosis or significant perinephric fat stranding. Bladder: The urinary bladder is unremarkable. Reproductive organs:  The uterus and ovaries appear within millimeters on CT. Lymph Nodes: No significant adenopathy is identified. Vasculature: Unremarkable. Osseous Structures:  No acute fracture or aggressive lesions.     Impression: Impression: Small to moderate volume free fluid in the pelvis. Finding may be physiologic and possibly secondary to recently ruptured ovarian cyst however, in the setting of fever or signs of sepsis, underlying infectious process not excluded. Clinically correlate. Otherwise, unremarkable contrast-enhanced CT of the abdomen and pelvis. Electronically Signed: Albert  MD Kandi  9/27/2024 6:32 PM EDT  Workstation ID: [id number]         Current medications:  Scheduled Meds:doxycycline, 100 mg, Intravenous, Q12H  phytonadione (AQUA-MEPHYTON, VITAMIN K) 10 mg in sodium chloride 0.9 % 50 mL IVPB, 10 mg, Intravenous, Once  piperacillin-tazobactam, 4.5 g, Intravenous, Q8H  sodium chloride, 10 mL, Intravenous, Q12H      Continuous Infusions:acetylcysteine (ACETADOTE) 6000 mg in D5W infusion (NON-APAP LIVER FAILURE) Third Dose, 6.25 mg/kg/hr  sodium chloride, 100 mL/hr, Last Rate: 100 mL/hr (09/29/24 0846)      PRN Meds:.  acetaminophen    senna-docusate sodium **AND** polyethylene glycol **AND** bisacodyl **AND** bisacodyl    ondansetron    sodium chloride    sodium chloride    Assessment & Plan   Assessment & Plan     Active Hospital Problems    Diagnosis  POA    **Sepsis [A41.9]  Yes    Elevated liver enzymes [R74.8]  Yes    Acute bilateral low back pain without sciatica [M54.50]  Yes    Elevated prolactin level [R79.89]  Yes      Resolved Hospital Problems   No resolved problems to display.        Brief Hospital Course to date:  Madalyn Alexandre is a 24 y.o. female with no significant PMH other than STI (Chlamydia) in 2020 who presented with fever of unknown etiology. She was found to have fever on admission with elevated transaminases and thrombocytopenia.    Plan:    SIRS: Leukopenia, fever, tachycardia, elevated procalcitonin.    - IV fluid bolus given in the ED, continue gentle IV fluid  - CBC in the a.m.  - Vancomycin and Zosyn started in the ED, stopped Vanc given negative MRSA PCR.  ID added on Doxycycline   - BC x 2 pending  - Urine culture pending  - ID consulted, appreciate Dr. Givens   - Monospot, HIV negative  - CBC, CMP in the a.m.  -- Patient with pelvic exam in the ED, later informed apparently swab was not sent to lab, patient does have previous history of Chlamydia, ? Repeat pelvic exam however discussed with Dr. 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 Brunner's assistance  - CMP daily  -- LFTs worsening and now tBili slightly elevated. INR improved this am  -- checking for tick borne illnesses as noted above     Low back pain  - MRI negative for acute findings  - Tylenol as needed      Total time spent: Time Spent: Time Spent: 35 minutes  Time spent includes time reviewing chart, face-to-face time, counseling patient/family/caregiver, ordering medications/tests/procedures, communicating with other health care professionals, documenting clinical information in the electronic health record, and coordination of care.    Expected Discharge Location and Transportation: home  Expected Discharge   Expected Discharge Date: 10/1/2024; Expected Discharge Time:      VTE Prophylaxis:  Mechanical VTE prophylaxis orders are present.         AM-PAC 6 Clicks Score (PT): 24 (09/29/24 0800)    CODE STATUS:   Code Status and Medical Interventions: CPR (Attempt to Resuscitate); Full Support   Ordered at: 09/27/24 2149     Code Status (Patient has no pulse and is not breathing):    CPR (Attempt to Resuscitate)     Medical Interventions (Patient has pulse or is breathing):    Full Support       Ellie Baumann MD  09/29/24        Electronically signed by Ellie Baumann MD at 09/29/24 1123       Ozzy Givens MD at 09/29/24 1117              INFECTIOUS DISEASE PROGRESS NOTE    Madalyn Alexandre  2000  [id number]    Date of Consult: 9/28/2024    Admission Date: 9/27/2024      Requesting Provider: Ellie Baumann MD  Evaluating Physician: Ozzy Givens MD    Reason for Consultation: Fever of unclear etiology    History of present illness:    Patient is a 24 y.o. female with h/o chlamydia, irregular menses, vaping, and marijuana use who presented to [facility] ED on 9/27 with low back pain with acute onset of fevers, rigors, nausea, and vomiting.  She was recently seen at [facility] for the back pain and started on muscle relaxers that she stated do help.  She has a pet cat and her boyfriend has a pet dog that goes outside and has risk for tick-borne illness.  On arrival to the ED, the patient had a Tmax of 102.5 and HR of 121.  Initial labs were WBC 2500 with 82% neutrophils, lactic acid 0.8, PCT 0.69, , , and bilirubin 0.6.  A respiratory panel PCR was negative.  A UA WBC was 0-2 with negative culture to date. A hepatitis panel was negative.  An HIV was negative.  A Monospot was negative.  CMV and EBV serologies are pending. Blood cultures are pending.  A CT/GC/trichomonas PCR is pending. A MRSA PCR is negative.  A tick-borne work up is pending.  Her ALT and AST are worse at 917 and 869, respectively, on 9/28 and her WBC is 2,900 with 51% segs/32% bands.  A CT scan of a/p with contrast on 9/27 showed small to moderate free pelvic fluid c/w possible ruptured ovarian cyst.  An MRI of lumbar spine showed mild degenerative changes.  A liver US showed no acute findings. She is currently on Vancomycin and Zosyn.  ID was asked to evaluate and manage her antibiotic therapy.     9/29/24: Tmax 103.1, currently 99.3.  WBC improved to 3.42 with 37% segs/44% bands, plts better to 133, ALT worse to 2972, and AST worse to 3202,  Bilirubin worse to 2.1.  UDS negative. HCG negative. Lyme EIA negative. CMV negative. She denies n/v/d, rashes, soa/cough, no headaches, and no pain.    Past Medical History:   Diagnosis Date    Abnormal Pap smear of cervix 4/12/22    Chlamydia 10/06/2020    Elevated prolactin level     Irregular menses        Past Surgical History:   Procedure Laterality Date    NO PAST SURGERIES         Family History   Problem Relation Age of Onset    No Known Problems Other     Diabetes type II Maternal Grandmother        Social History     Socioeconomic History    Marital status: Single   Tobacco Use    Smoking status: Never    Smokeless tobacco: Never   Vaping Use    Vaping status: Former    Substances: Nicotine, Flavoring    Devices: [employer]annels[employer] tank   Substance and Sexual Activity    Alcohol use: No    Drug use: Not Currently     Types: Marijuana    Sexual activity: Yes     Partners: Male     Birth control/protection: Condom, None     Comment: occasional use       Allergies   Allergen Reactions    Vancomycin Itching     Infusion rate related         Medication:    Current Facility-Administered Medications:     [COMPLETED] acetylcysteine (ACETADOTE) 8,280 mg in dextrose (D5W) 5 % 200 mL infusion, 150 mg/kg, Intravenous, Once, Last Rate: 241.4 mL/hr at 09/29/24 0251, 8,280 mg at 09/29/24 0251 **FOLLOWED BY** [COMPLETED] acetylcysteine (ACETADOTE) 2,760 mg in dextrose (D5W) 5 % 500 mL infusion, 50 mg/kg, Intravenous, Once, Last Rate: 125 mL/hr at 09/29/24 0009, 2,760 mg at 09/29/24 0009 **FOLLOWED BY** acetylcysteine (ACETADOTE) 6,000 mg in dextrose (D5W) 5 % 1,000 mL infusion, 6.25 mg/kg/hr, Intravenous, Continuous, Brunner, Mark I, MD, Last Rate: 57.5 mL/hr at 09/29/24 1616, 6.25 mg/kg/hr at 09/29/24 1616    sennosides-docusate (PERICOLACE) 8.6-50 MG per tablet 2 tablet, 2 tablet, Oral, BID PRN **AND** polyethylene glycol (MIRALAX) packet 17 g, 17 g, Oral, Daily PRN **AND** bisacodyl (DULCOLAX) EC tablet 5 mg, 5 mg, Oral,  Daily PRN **AND** bisacodyl (DULCOLAX) suppository 10 mg, 10 mg, Rectal, Daily PRN, Janette Shukla, APRN    doxycycline (VIBRAMYCIN) 100 mg in sodium chloride 0.9 % 100 mL MBP, 100 mg, Intravenous, Q12H, Hay Waters PA, 100 mg at 24 0508    ondansetron (ZOFRAN) injection 4 mg, 4 mg, Intravenous, Q6H PRN, Janette Shukla APRN, 4 mg at 24 0955    phytonadione (AQUA-MEPHYTON, VITAMIN K) 10 mg in sodium chloride 0.9 % 50 mL IVPB, 10 mg, Intravenous, Once, Brunner, Mark I, MD    prochlorperazine (COMPAZINE) injection 5 mg, 5 mg, Intravenous, Q6H PRN, Ellie Baumann MD, 5 mg at 24 1144    sodium chloride 0.9 % flush 10 mL, 10 mL, Intravenous, Q12H, Janette Shukla APRN, 10 mL at 24 0841    sodium chloride 0.9 % flush 10 mL, 10 mL, Intravenous, PRN, Janette Shukla, APRN    sodium chloride 0.9 % infusion 40 mL, 40 mL, Intravenous, PRN, Janette Shukla, APRN    sodium chloride 0.9 % infusion, 100 mL/hr, Intravenous, Continuous, Janette Shukla, APRN, Last Rate: 100 mL/hr at 24 0846, 100 mL/hr at 24 0846    Antibiotics:  Anti-Infectives (From admission, onward)      Ordered     Dose/Rate Route Frequency Start Stop    24 1514  doxycycline (VIBRAMYCIN) 100 mg in sodium chloride 0.9 % 100 mL MBP        Ordering Provider: Hay Waters PA    100 mg  over 60 Minutes Intravenous Every 12 Hours 24 1800 10/05/24 1759    24 2215  vancomycin (VANCOCIN) 1,000 mg in sodium chloride 0.9 % 250 mL IVPB-VTB        Ordering Provider: Jamee Manriquez RPH    20 mg/kg × 55.2 kg  250 mL/hr over 60 Minutes Intravenous Once 24 2315 24 2343    24  piperacillin-tazobactam (ZOSYN) 3.375 g IVPB in 100 mL NS MBP (CD)        Ordering Provider: Bacilio Espino MD    3.375 g  over 30 Minutes Intravenous Once 24 2100 24              Review of Systems:  See above.       Physical Exam:   Vital Signs  Temp (24hrs), Av.4 °F (38 °C), Min:99  °F (37.2 °C), Max:103.1 °F (39.5 °C)    Temp  Min: 99 °F (37.2 °C)  Max: 103.1 °F (39.5 °C)  BP  Min: 91/75  Max: 118/72  Pulse  Min: 60  Max: 104  Resp  Min: 16  Max: 18  SpO2  Min: 95 %  Max: 100 %    GENERAL: Awake and alert, in no acute distress.   HEENT: Normocephalic, atraumatic.  PERRL. EOMI. No conjunctival injection. No icterus. Oropharynx clear without evidence of thrush or exudate.  NECK: Supple   HEART: RRR; No murmur, rubs, gallops.   LUNGS: Clear to auscultation bilaterally without wheezing, rales, rhonchi. Normal respiratory effort. Nonlabored.  ABDOMEN: Soft, nontender, nondistended. No rebound or guarding. NO mass or HSM.  EXT:  No cyanosis, clubbing or edema. No cord.  :  Without Su catheter.  MSK: No joint effusions or erythema  SKIN: Warm and dry without cutaneous eruptions on Inspection/palpation.    NEURO: Oriented to PPT.  Motor 5/5 strength  PSYCHIATRIC: Normal insight and judgment. Cooperative with PE    Laboratory Data    Results from last 7 days   Lab Units 09/29/24  0640 09/28/24  0718 09/27/24  1801   WBC 10*3/mm3 3.42 2.89* 2.54*   HEMOGLOBIN g/dL 13.6 13.5 14.0   HEMATOCRIT % 39.4 39.7 40.9   PLATELETS 10*3/mm3 133* 123* 132*     Results from last 7 days   Lab Units 09/29/24  0640   SODIUM mmol/L 135*   POTASSIUM mmol/L 3.3*   CHLORIDE mmol/L 100   CO2 mmol/L 22.0   BUN mg/dL 4*   CREATININE mg/dL 0.75   GLUCOSE mg/dL 92   CALCIUM mg/dL 8.0*     Results from last 7 days   Lab Units 09/29/24  0640   ALK PHOS U/L 251*   BILIRUBIN mg/dL 2.1*   ALT (SGPT) U/L 2,972*   AST (SGOT) U/L 3,202*             Results from last 7 days   Lab Units 09/27/24  1801   LACTATE mmol/L 0.8     Results from last 7 days   Lab Units 09/28/24  0718   CK TOTAL U/L 57         Estimated Creatinine Clearance: 88.6 mL/min (by C-G formula based on SCr of 0.75 mg/dL).      Microbiology:  Microbiology Results (last 10 days)       Procedure Component Value - Date/Time    MRSA Screen, PCR (Inpatient) - Swab, Nares  [[id number]]  (Normal) Collected: 09/27/24 2250    Lab Status: Final result Specimen: Swab from Nares Updated: 09/28/24 0906     MRSA PCR Negative    Narrative:      The negative predictive value of this diagnostic test is high and should only be used to consider de-escalating anti-MRSA therapy. A positive result may indicate colonization with MRSA and must be correlated clinically.  MRSA Negative    Blood Culture - Blood, Wrist, Right [[id number]]  (Normal) Collected: 09/27/24 2039    Lab Status: Preliminary result Specimen: Blood from Wrist, Right Updated: 09/28/24 2132     Blood Culture No growth at 24 hours    Blood Culture - Blood, Arm, Right [[id number]]  (Normal) Collected: 09/27/24 1801    Lab Status: Preliminary result Specimen: Blood from Arm, Right Updated: 09/28/24 1916     Blood Culture No growth at 24 hours    COVID PRE-OP / PRE-PROCEDURE SCREENING ORDER (NO ISOLATION) - Swab, Nasopharynx [[id number]]  (Normal) Collected: 09/27/24 1728    Lab Status: Final result Specimen: Swab from Nasopharynx Updated: 09/27/24 1847    Narrative:      The following orders were created for panel order COVID PRE-OP / PRE-PROCEDURE SCREENING ORDER (NO ISOLATION) - Swab, Nasopharynx.  Procedure                               Abnormality         Status                     ---------                               -----------         ------                     COVID-19 and FLU A/B PCR...[[id number]]  Normal              Final result                 Please view results for these tests on the individual orders.    COVID-19 and FLU A/B PCR, 1 HR TAT - Swab, Nasopharynx [[id number]]  (Normal) Collected: 09/27/24 1728    Lab Status: Final result Specimen: Swab from Nasopharynx Updated: 09/27/24 1847     COVID19 Not Detected     Influenza A PCR Not Detected     Influenza B PCR Not Detected    Narrative:      Fact sheet for providers: https://www.fda.gov/media/633315/download    Fact sheet for patients:  https://www.fda.gov/media/664372/download    Test performed by PCR.    Urine Culture - Urine, Urine, Clean Catch [[id number]] Collected: 09/27/24 1728    Lab Status: Final result Specimen: Urine, Clean Catch Updated: 09/29/24 0941     Urine Culture >100,000 CFU/mL Mixed Beena Isolated    Narrative:      Specimen contains mixed organisms of questionable pathogenicity suggestive of contamination. If symptoms persist, suggest recollection.  Colonization of the urinary tract without infection is common. Treatment is discouraged unless the patient is symptomatic, pregnant, or undergoing an invasive urologic procedure.    Respiratory Panel PCR w/COVID-19(SARS-CoV-2) ALBERTO/ALIN/HAKAN/PAD/COR/RASHI In-House, NP Swab in UTM/VTM, 2 HR TAT - Swab, Nasopharynx [[id number]]  (Normal) Collected: 09/27/24 1728    Lab Status: Final result Specimen: Swab from Nasopharynx Updated: 09/27/24 2200     ADENOVIRUS, PCR Not Detected     Coronavirus 229E Not Detected     Coronavirus HKU1 Not Detected     Coronavirus NL63 Not Detected     Coronavirus OC43 Not Detected     COVID19 Not Detected     Human Metapneumovirus Not Detected     Human Rhinovirus/Enterovirus Not Detected     Influenza A PCR Not Detected     Influenza B PCR Not Detected     Parainfluenza Virus 1 Not Detected     Parainfluenza Virus 2 Not Detected     Parainfluenza Virus 3 Not Detected     Parainfluenza Virus 4 Not Detected     RSV, PCR Not Detected     Bordetella pertussis pcr Not Detected     Bordetella parapertussis PCR Not Detected     Chlamydophila pneumoniae PCR Not Detected     Mycoplasma pneumo by PCR Not Detected    Narrative:      In the setting of a positive respiratory panel with a viral infection PLUS a negative procalcitonin without other underlying concern for bacterial infection, consider observing off antibiotics or discontinuation of antibiotics and continue supportive care. If the respiratory panel is positive for atypical bacterial infection (Bordetella  pertussis, Chlamydophila pneumoniae, or Mycoplasma pneumoniae), consider antibiotic de-escalation to target atypical bacterial infection.                  Radiology:  Imaging Results (Last 72 Hours)       Procedure Component Value Units Date/Time    US Liver [[id number]] Collected: 09/28/24 1328     Updated: 09/28/24 1336    Narrative:      US LIVER    Date of Exam: 9/28/2024 5:34 AM EDT    Indication: elevated liver enzymes.    Comparison: CT abdomen and pelvis 9/27/2024    Technique: Grayscale and color Doppler ultrasound evaluation of the right upper quadrant was performed.      Findings:  Visualized portions of the pancreas appear normal.    Normal appearance of the liver. No focal liver lesion. The portal vein is patent with hepatopetal flow. The middle hepatic vein is patent with normal waveform.    Normal appearance of the gallbladder.    Normal appearance of the common bile duct measuring 0.2 cm in diameter.    Normal appearance of the right kidney.      Impression:      Impression:  Normal right upper quadrant ultrasound.        Electronically Signed: John Gallardo MD    9/28/2024 1:33 PM EDT    Workstation ID: [id number]    MRI Lumbar Spine With & Without Contrast [[id number]] Collected: 09/27/24 1938     Updated: 09/27/24 1945    Narrative:        MRI LUMBAR SPINE W WO CONTRAST    Date of Exam: 9/27/2024 6:43 PM EDT    Indication: sepsis, midline lumbar spine pain, eval for infectious process.     Comparison: None available.    Technique:  Routine multiplanar/multisequence sequence images of the lumbar spine were obtained before and after the uneventful administration of Multihance.        Findings:  There is normal height, alignment, and signal intensity of the lumbar vertebral bodies. Spinal cord terminates at the L1 level with normal signal seen within the conus.    Visualized paraspinal soft tissues appear within normal limits.    Postcontrast images demonstrate no pathologic contrast  enhancement.    Axial images demonstrate:    L1/2: No significant disc bulge. Facet joints are within normal limits. No spinal canal stenosis or neural foraminal narrowing.    L2/3: No significant disc bulge. Facet joints appear within normal limits. No spinal canal stenosis or neural foraminal narrowing.    L3/4: No significant disc bulge. Facet joints are within normal limits. No spinal canal stenosis or neural foraminal narrowing.    L4/5: No significant disc bulge. There are mild degenerative facet changes bilaterally. No spinal canal stenosis or neural foraminal narrowing.    5/S1: No significant disc bulge. There are mild degenerative facet changes left greater than right. No spinal canal stenosis or neural foraminal narrowing.      Impression:      Impression:    1. Mild degenerative facet change at the L4/5 and L5/S1 levels. There is no focal disc protrusion, spinal canal stenosis, or neural foraminal narrowing identified.        Electronically Signed: Sal Pacheco MD    9/27/2024 7:42 PM EDT    Workstation ID: [id number]    CT Abdomen Pelvis With Contrast [[id number]] Collected: 09/27/24 1826     Updated: 09/27/24 1835    Narrative:      CT ABDOMEN PELVIS W CONTRAST    Date of Exam: 9/27/2024 6:10 PM EDT    Indication: low back pain, fever, tachycardia.    Comparison: None available.    Technique: Axial CT images were obtained of the abdomen and pelvis following the uneventful intravenous administration of Isovue-300, 97 mL. Reconstructed coronal and sagittal images were also obtained. Automated exposure control and iterative   construction methods were used.      Findings:    Lung Bases:  The visualized lung bases and lower mediastinal structures are unremarkable.    Peritoneum:  No free intraperitoneal air is visualized. Small to moderate volume free fluid is visualized in the pelvis.    Abdominal wall:  Unremarkable.    Liver:  Liver is normal in size and contour. No focal  lesions.    Biliary/Gallbladder:  The gallbladder is normal without evidence of radiopaque gallstones. The biliary tree is nondilated.    Pancreas:  Pancreas is within normal limits. There is no evidence of pancreatic mass or peripancreatic inflammatory changes.    Spleen:  Spleen is normal in size and contour.    Gastrointestinal/Mesentery:   No evidence of bowel obstruction or gross inflammatory changes. The appendix appears within normal limits.      Adrenals:  Adrenal glands are unremarkable.    Kidneys:  The kidneys are in anatomic position. No evidence of nephrolithiasis. No evidence of hydronephrosis or significant perinephric fat stranding.    Bladder:   The urinary bladder is unremarkable.    Reproductive organs:    The uterus and ovaries appear within millimeters on CT.    Lymph Nodes:  No significant adenopathy is identified.     Vasculature:  Unremarkable.    Osseous Structures:    No acute fracture or aggressive lesions.        Impression:      Impression:    Small to moderate volume free fluid in the pelvis. Finding may be physiologic and possibly secondary to recently ruptured ovarian cyst however, in the setting of fever or signs of sepsis, underlying infectious process not excluded. Clinically correlate.    Otherwise, unremarkable contrast-enhanced CT of the abdomen and pelvis.        Electronically Signed: Albert Chau MD    9/27/2024 6:32 PM EDT    Workstation ID: [id number]              Impression:   Sepsis- manifested by fever, tachycardia, leukopenia, thrombocytopenia, elevated procalcitonin, elevated  transaminases with unclear etiology.  She has a risk factor for exposure to ticks with pets that go outside.   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  Nausea and vomiting with CT scan that showed some free pelvic fluid that may be c/w ruptured ovarian  cyst.  Leukopenia, improved  Thrombocytopenia  Transaminitis-significantly worse.  Her bilirubin is now mildly elevated.  Her viral hepatitis panel was negative.  Her CMV serology was negative.  I will order EBV and CMV PCR studies.  Elevated procalcitonin  Coagulopathy.      PLAN/RECOMMENDATIONS:   Follow blood cultures--negative to date.  Follow tick-borne work up pending.  Lyme EIA negative.  Follow STI work up, EBV, CMV--negative. Monospot negative.   EBV and CMV PCR studies  Discontinue Zosyn  Continue IV Doxycycline for empiric coverage for tick-borne illness  Avoid acetaminophen  Continue N-acetylcysteine protocol  Markers for autoimmune disease    Ozzy Givens MD saw and examined patient, verified hx and PE, read all radiographic studies, reviewed labs and micro data, and formulated dx, plan for treatment and all medical decision making.      Hay Waters PA-C for Ozzy Givens MD    I discussed her complex situation with Dr. Brunner in detail today.  Her high complexity medical problems required high complexity medical decision making today.    Ozzy Givens MD  2024  16:52 EDT                    Electronically signed by Ozzy Givens MD at 24 1653       Ellie Baumann MD at 24 0529              [facility] Medicine Services  PROGRESS NOTE    Patient Name: Madalyn Alexandre  : 2000  MRN: [id number]    Date of Admission: 2024  Primary Care Physician: Provider, No Known    Subjective   Subjective     CC: fever    HPI:  Feeling pretty nauseated this am, febrile overnight but feeling better now.     Objective   Objective     Vital Signs:   Temp:  [99.8 °F (37.7 °C)-102.5 °F (39.2 °C)] 100.8 °F (38.2 °C)  Heart Rate:  [] 111  Resp:  [18] 18  BP: ()/(57-83) 96/67     Physical Exam:  Constitutional: No acute distress, awake, alert  HENT: NCAT, mucous membranes moist  Respiratory: Clear to auscultation bilaterally, respiratory  effort normal   Cardiovascular: RRR, no murmurs, rubs, or gallops  Gastrointestinal: Positive bowel sounds, soft, nontender, nondistended  Musculoskeletal: No bilateral ankle edema  Psychiatric: Appropriate affect, cooperative  Neurologic: Oriented x 3, strength symmetric in all extremities, Cranial Nerves grossly intact to confrontation, speech clear  Skin: No rashes   Results Reviewed:  LAB RESULTS:      Lab 09/27/24 2225 09/27/24  1801   WBC  --  2.54*   HEMOGLOBIN  --  14.0   HEMATOCRIT  --  40.9   PLATELETS  --  132*   NEUTROS ABS  --  2.07   IMMATURE GRANS (ABS)  --  0.01   LYMPHS ABS  --  0.31*   MONOS ABS  --  0.13   EOS ABS  --  0.01   MCV  --  93.0   PROCALCITONIN  --  0.69*   LACTATE  --  0.8   PROTIME 16.6*  --          Lab 09/27/24  1801   SODIUM 136   POTASSIUM 3.6   CHLORIDE 102   CO2 24.0   ANION GAP 10.0   BUN 6   CREATININE 0.77   EGFR 109.3   GLUCOSE 117*   CALCIUM 8.7         Lab 09/27/24  1801   TOTAL PROTEIN 6.9   ALBUMIN 4.0   GLOBULIN 2.9   ALT (SGPT) 614*   AST (SGOT) 614*   BILIRUBIN 0.6   ALK PHOS 203*   LIPASE 25         Lab 09/27/24 2225   PROTIME 16.6*   INR 1.33*                 Brief Urine Lab Results  (Last result in the past 365 days)        Color   Clarity   Blood   Leuk Est   Nitrite   Protein   CREAT   Urine HCG        09/27/24 1733               Negative               Microbiology Results Abnormal       Procedure Component Value - Date/Time    Respiratory Panel PCR w/COVID-19(SARS-CoV-2) ALBERTO/ALIN/HAKAN/PAD/COR/RASHI In-House, NP Swab in UTM/VTM, 2 HR TAT - Swab, Nasopharynx [[id number]]  (Normal) Collected: 09/27/24 1728    Lab Status: Final result Specimen: Swab from Nasopharynx Updated: 09/27/24 2200     ADENOVIRUS, PCR Not Detected     Coronavirus 229E Not Detected     Coronavirus HKU1 Not Detected     Coronavirus NL63 Not Detected     Coronavirus OC43 Not Detected     COVID19 Not Detected     Human Metapneumovirus Not Detected     Human Rhinovirus/Enterovirus Not Detected      Influenza A PCR Not Detected     Influenza B PCR Not Detected     Parainfluenza Virus 1 Not Detected     Parainfluenza Virus 2 Not Detected     Parainfluenza Virus 3 Not Detected     Parainfluenza Virus 4 Not Detected     RSV, PCR Not Detected     Bordetella pertussis pcr Not Detected     Bordetella parapertussis PCR Not Detected     Chlamydophila pneumoniae PCR Not Detected     Mycoplasma pneumo by PCR Not Detected    Narrative:      In the setting of a positive respiratory panel with a viral infection PLUS a negative procalcitonin without other underlying concern for bacterial infection, consider observing off antibiotics or discontinuation of antibiotics and continue supportive care. If the respiratory panel is positive for atypical bacterial infection (Bordetella pertussis, Chlamydophila pneumoniae, or Mycoplasma pneumoniae), consider antibiotic de-escalation to target atypical bacterial infection.    COVID PRE-OP / PRE-PROCEDURE SCREENING ORDER (NO ISOLATION) - Swab, Nasopharynx [[id number]]  (Normal) Collected: 09/27/24 1728    Lab Status: Final result Specimen: Swab from Nasopharynx Updated: 09/27/24 1847    Narrative:      The following orders were created for panel order COVID PRE-OP / PRE-PROCEDURE SCREENING ORDER (NO ISOLATION) - Swab, Nasopharynx.  Procedure                               Abnormality         Status                     ---------                               -----------         ------                     COVID-19 and FLU A/B PCR...[[id number]]  Normal              Final result                 Please view results for these tests on the individual orders.    COVID-19 and FLU A/B PCR, 1 HR TAT - Swab, Nasopharynx [[id number]]  (Normal) Collected: 09/27/24 1728    Lab Status: Final result Specimen: Swab from Nasopharynx Updated: 09/27/24 1847     COVID19 Not Detected     Influenza A PCR Not Detected     Influenza B PCR Not Detected    Narrative:      Fact sheet for providers:  https://www.fda.gov/media/989322/download    Fact sheet for patients: https://www.fda.gov/media/642912/download    Test performed by PCR.            MRI Lumbar Spine With & Without Contrast    Result Date: 9/27/2024  MRI LUMBAR SPINE W WO CONTRAST Date of Exam: 9/27/2024 6:43 PM EDT Indication: sepsis, midline lumbar spine pain, eval for infectious process.  Comparison: None available. Technique:  Routine multiplanar/multisequence sequence images of the lumbar spine were obtained before and after the uneventful administration of Multihance.  Findings: There is normal height, alignment, and signal intensity of the lumbar vertebral bodies. Spinal cord terminates at the L1 level with normal signal seen within the conus. Visualized paraspinal soft tissues appear within normal limits. Postcontrast images demonstrate no pathologic contrast enhancement. Axial images demonstrate: L1/2: No significant disc bulge. Facet joints are within normal limits. No spinal canal stenosis or neural foraminal narrowing. L2/3: No significant disc bulge. Facet joints appear within normal limits. No spinal canal stenosis or neural foraminal narrowing. L3/4: No significant disc bulge. Facet joints are within normal limits. No spinal canal stenosis or neural foraminal narrowing. L4/5: No significant disc bulge. There are mild degenerative facet changes bilaterally. No spinal canal stenosis or neural foraminal narrowing. 5/S1: No significant disc bulge. There are mild degenerative facet changes left greater than right. No spinal canal stenosis or neural foraminal narrowing.     Impression: Impression: 1. Mild degenerative facet change at the L4/5 and L5/S1 levels. There is no focal disc protrusion, spinal canal stenosis, or neural foraminal narrowing identified. Electronically Signed: Sal Pacheco MD  9/27/2024 7:42 PM EDT  Workstation ID: [id number]    CT Abdomen Pelvis With Contrast    Result Date: 9/27/2024  CT ABDOMEN PELVIS W CONTRAST Date  of Exam: 9/27/2024 6:10 PM EDT Indication: low back pain, fever, tachycardia. Comparison: None available. Technique: Axial CT images were obtained of the abdomen and pelvis following the uneventful intravenous administration of Isovue-300, 97 mL. Reconstructed coronal and sagittal images were also obtained. Automated exposure control and iterative construction methods were used. Findings: Lung Bases: The visualized lung bases and lower mediastinal structures are unremarkable. Peritoneum: No free intraperitoneal air is visualized. Small to moderate volume free fluid is visualized in the pelvis. Abdominal wall: Unremarkable. Liver: Liver is normal in size and contour. No focal lesions. Biliary/Gallbladder: The gallbladder is normal without evidence of radiopaque gallstones. The biliary tree is nondilated. Pancreas: Pancreas is within normal limits. There is no evidence of pancreatic mass or peripancreatic inflammatory changes. Spleen: Spleen is normal in size and contour. Gastrointestinal/Mesentery: No evidence of bowel obstruction or gross inflammatory changes. The appendix appears within normal limits.  Adrenals: Adrenal glands are unremarkable. Kidneys: The kidneys are in anatomic position. No evidence of nephrolithiasis. No evidence of hydronephrosis or significant perinephric fat stranding. Bladder: The urinary bladder is unremarkable. Reproductive organs:  The uterus and ovaries appear within millimeters on CT. Lymph Nodes: No significant adenopathy is identified. Vasculature: Unremarkable. Osseous Structures:  No acute fracture or aggressive lesions.     Impression: Impression: Small to moderate volume free fluid in the pelvis. Finding may be physiologic and possibly secondary to recently ruptured ovarian cyst however, in the setting of fever or signs of sepsis, underlying infectious process not excluded. Clinically correlate. Otherwise, unremarkable contrast-enhanced CT of the abdomen and pelvis.  Electronically Signed: Albert Chau MD  9/27/2024 6:32 PM EDT  Workstation ID: [id number]         Current medications:  Scheduled Meds:piperacillin-tazobactam, 4.5 g, Intravenous, Q8H  sodium chloride, 10 mL, Intravenous, Q12H  vancomycin, 1,000 mg, Intravenous, Q12H      Continuous Infusions:Pharmacy to dose vancomycin,   sodium chloride, 100 mL/hr, Last Rate: 100 mL/hr (09/27/24 5578)      PRN Meds:.  acetaminophen    senna-docusate sodium **AND** polyethylene glycol **AND** bisacodyl **AND** bisacodyl    ondansetron    Pharmacy to dose vancomycin    sodium chloride    sodium chloride    Assessment & Plan   Assessment & Plan     Active Hospital Problems    Diagnosis  POA    **Sepsis [A41.9]  Yes    Elevated liver enzymes [R74.8]  Yes    Acute bilateral low back pain without sciatica [M54.50]  Yes    Elevated prolactin level [R79.89]  Yes      Resolved Hospital Problems   No resolved problems to display.        Brief Hospital Course to date:  Madalyn Alexandre is a 24 y.o. female with no significant PMH other than STI (Chlamydia) in 2020 who presented with fever of unknown etiology. She was found to have fever on admission with elevated transaminases and thrombocytopenia.    Plan:    SIRS: Leukopenia, fever, tachycardia, elevated procalcitonin.    - IV fluid bolus given in the ED, continue gentle IV fluid  - CBC in the a.m.  - Vancomycin and Zosyn started in the ED, continue for now  - BC x 2 pending  - Urine culture pending  - ID consult for evaluation in the AM   - Monospot, HIV negative  - CBC, CMP in the a.m.  -- Patient with pelvic exam in the ED, later informed apparently swab was not sent to lab, patient does have previous history of Chlamydia, ? Repeat pelvic exam however discussed with Dr. 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  -- checking for tick borne illnesses as noted above     Low back pain  - MRI negative for acute findings  - Tylenol as needed      Total time spent: Time Spent: Time Spent: 35 minutes  Time spent includes time reviewing chart, face-to-face time, counseling patient/family/caregiver, ordering medications/tests/procedures, communicating with other health care professionals, documenting clinical information in the electronic health record, and coordination of care.    Expected Discharge Location and Transportation: home  Expected Discharge   Expected Discharge Date: 9/30/2024; Expected Discharge Time:      VTE Prophylaxis:  Mechanical VTE prophylaxis orders are present.         AM-PAC 6 Clicks Score (PT): 24 (09/27/24 2146)    CODE STATUS:   Code Status and Medical Interventions: CPR (Attempt to Resuscitate); Full Support   Ordered at: 09/27/24 2148     Code Status (Patient has no pulse and is not breathing):    CPR (Attempt to Resuscitate)     Medical Interventions (Patient has pulse or is breathing):    Full Support       Ellie Baumann MD  09/28/24       Electronically signed by Ellie Baumann MD at 09/28/24 1321          Consult Notes (all)        Ck Best MD at 09/28/24 1916              Referring Provider: Ellie Baumann MD   Reason for Consultation: Sepsis    Patient Care Team:  Provider, No Known as PCP - General    Chief complaint Fever, nausea and vomiting, back  pain    Subjective .     History of present illness: Madalyn Alexandre is a 24 y.o.  LMP 2026 female who presented to the ED with complaint of  back pain, vomiting, fever.  She states that 3 days ago she developed low back pain.  She then had acute onset of fever with associated nausea and vomiting.  She was evaluated at [facility] who prescribed muscle relaxers which she notes does help the back pain but then it returns when the medication wears off.  Upon arrival to the ED, labs are concerning for elevated liver enzymes, elevated procalcitonin and leukopenia.  CT abdomen/pelvis notes small to moderate volume free fluid in the pelvis otherwise no acute findings.  MRI lumbar spine is negative for acute findings.  She is currently sexually active in a monogamous relationship  She was treated for chlamydia cervicitis several years ago  Her last pap was  and negative  Contraception is withdrawal and has used Plan B about 4 times in the last year.  She does not typically experience dyspareunia but does have pain cramp-like with orgasm.  She reports no abnormal vaginal discharge.  She had a pelvic exam in the emergency department which was essentially completely negative including the absence of pain on bimanual exam.  Pelvic imaging reveals a small to moderate amount of pelvic fluid which may well be physiologic.  There is no evidence of ovarian torsion.  She has had no gynecologic surgery in the past.    Review of Systems  Pertinent items are noted in HPI, all other systems reviewed and negative    History  Past Medical History:   Diagnosis Date    Abnormal Pap smear of cervix 20    Chlamydia 10/06/2020    Elevated prolactin level     Irregular menses    ,   Past Surgical History:   Procedure Laterality Date    NO PAST SURGERIES     ,   Family History   Problem Relation Age of Onset    No Known Problems Other     Diabetes type II Maternal Grandmother    ,   Social History     Socioeconomic History    Marital  status: Single   Tobacco Use    Smoking status: Never    Smokeless tobacco: Never   Vaping Use    Vaping status: Former    Substances: Nicotine, Flavoring    Devices: Refillable tank   Substance and Sexual Activity    Alcohol use: No    Drug use: Not Currently     Types: Marijuana    Sexual activity: Yes     Partners: Male     Birth control/protection: Condom, None     Comment: occasional use     E-cigarette/Vaping    E-cigarette/Vaping Use Former User      E-cigarette/Vaping Substances    Nicotine Yes     THC No     CBD No     Flavoring Yes      E-cigarette/Vaping Devices    Disposable No     Pre-filled or Refillable Cartridge No     Refillable Tank Yes     Pre-filled Pod No          ,   Medications Prior to Admission   Medication Sig Dispense Refill Last Dose    cyclobenzaprine (FLEXERIL) 10 MG tablet Take 1 tablet by mouth At Night As Needed for Muscle Spasms. 10 tablet 0     diclofenac (VOLTAREN) 75 MG EC tablet Take 1 tablet by mouth 2 (Two) Times a Day As Needed (pain). 20 tablet 0    , Scheduled Meds:  acetylcysteine (ACETADOTE) 8,280 mg in dextrose (D5W) 5 % 200 mL infusion, 150 mg/kg, Intravenous, Once   Followed by  acetylcysteine (ACETADOTE) 2,760 mg in dextrose (D5W) 5 % 500 mL infusion, 50 mg/kg, Intravenous, Once  doxycycline, 100 mg, Intravenous, Q12H  phytonadione (AQUA-MEPHYTON, VITAMIN K) 10 mg in sodium chloride 0.9 % 50 mL IVPB, 10 mg, Intravenous, Once  piperacillin-tazobactam, 4.5 g, Intravenous, Q8H  sodium chloride, 10 mL, Intravenous, Q12H   , Continuous Infusions:  acetylcysteine (ACETADOTE) 6000 mg in D5W infusion (NON-APAP LIVER FAILURE) Third Dose, 6.25 mg/kg/hr  sodium chloride, 100 mL/hr, Last Rate: 100 mL/hr (09/27/24 7845)   , PRN Meds:    acetaminophen    senna-docusate sodium **AND** polyethylene glycol **AND** bisacodyl **AND** bisacodyl    ondansetron    sodium chloride    sodium chloride, and Allergies:  Vancomycin    Objective     Vital Signs   Temp:  [98.8 °F (37.1 °C)-103.1  °F (39.5 °C)] 103.1 °F (39.5 °C)  Heart Rate:  [] 104  Resp:  [14-20] 20  BP: ()/(57-82) 111/82    Physical Exam:   General Appearance: alert, appears stated age, and cooperative  Throat: oopharynx normal  Lungs: clear to auscultation, respirations regular, respirations even, and respirations unlabored  Heart: regular rhythm & normal rate, normal S1, S2, no murmur, no gallop, no rub, and no click  Abdomen: normal bowel sounds, no masses, soft non-tender, no guarding, and no rebound tenderness  Extremities: moves extremities well, no edema, no cyanosis, and no redness  Skin: no bleeding, bruising or rash  Psych: normal    Results Review:   I reviewed the patient's new clinical results.      Assessment & Plan       Sepsis    Elevated prolactin level    Elevated liver enzymes    Acute bilateral low back pain without sciatica      Fever, nausea vomiting, back pain  I think the likelihood of a gynecologic source of the above is quite low.  She is currently being treated with antibiotics that would cover pelvic inflammatory disease.  Should she fail to respond to either the current treatment regimen or the tincture of time and further gynecologic evaluation is needed please do not hesitate to contact us    I discussed the patients findings and my recommendations with patient and family    Ck Best MD  09/28/24  19:16 EDT      Electronically signed by Ck Best MD, 09/28/24, 7:25 PM EDT.            Electronically signed by Ck Best MD at 09/28/24 1925       Ozzy Givens MD at 09/28/24 1456        Consult Orders    1. Inpatient Infectious Diseases Consult [[id number]] ordered by Ellie Baumann MD at 09/28/24 1323                     INFECTIOUS DISEASE CONSULT/INITIAL HOSPITAL VISIT    Madalyn Alexandre  2000  [id number]    Date of Consult: 9/28/2024    Admission Date: 9/27/2024      Requesting Provider: Ellie Baumann MD  Evaluating Physician: Ozzy Givens  MD    Reason for Consultation: Fever of unclear etiology    History of present illness:    Patient is a 24 y.o. female with h/o chlamydia, irregular menses, vaping, and marijuana use who presented to [facility] ED on 9/27 with low back pain with acute onset of fevers, rigors, nausea, and vomiting.  She was recently seen at [facility] for the back pain and started on muscle relaxers that she stated do help.  She has a pet cat and her boyfriend has a pet dog that goes outside and has risk for tick-borne illness.  On arrival to the ED, the patient had a Tmax of 102.5 and HR of 121.  Initial labs were WBC 2500 with 82% neutrophils, lactic acid 0.8, PCT 0.69, , , and bilirubin 0.6.  A respiratory panel PCR was negative.  A UA WBC was 0-2 with negative culture to date. A hepatitis panel was negative.  An HIV was negative.  A Monospot was negative.  CMV and EBV serologies are pending. Blood cultures are pending.  A CT/GC/trichomonas PCR is pending. A MRSA PCR is negative.  A tick-borne work up is pending.  Her ALT and AST are worse at 917 and 869, respectively, on 9/28 and her WBC is 2,900 with 51% segs/32% bands.  A CT scan of a/p with contrast on 9/27 showed small to moderate free pelvic fluid c/w possible ruptured ovarian cyst.  An MRI of lumbar spine showed mild degenerative changes.  A liver US showed no acute findings. She is currently on Vancomycin and Zosyn.  ID was asked to evaluate and manage her antibiotic therapy.     Past Medical History:   Diagnosis Date    Abnormal Pap smear of cervix 4/12/22    Chlamydia 10/06/2020    Elevated prolactin level     Irregular menses        Past Surgical History:   Procedure Laterality Date    NO PAST SURGERIES         Family History   Problem Relation Age of Onset    No Known Problems Other     Diabetes type II Maternal Grandmother        Social History     Socioeconomic History    Marital status: Single   Tobacco Use    Smoking status: Never    Smokeless tobacco: Never    Vaping Use    Vaping status: Former    Substances: Nicotine, Flavoring    Devices: Refillable tank   Substance and Sexual Activity    Alcohol use: No    Drug use: Not Currently     Types: Marijuana    Sexual activity: Yes     Partners: Male     Birth control/protection: Condom, None     Comment: occasional use       Allergies   Allergen Reactions    Vancomycin Itching     Infusion rate related         Medication:    Current Facility-Administered Medications:     acetaminophen (TYLENOL) tablet 650 mg, 650 mg, Oral, Q4H PRN, Janette Shukla APRN, 650 mg at 09/28/24 1706    acetylcysteine (ACETADOTE) 8,280 mg in dextrose (D5W) 5 % 200 mL infusion, 150 mg/kg, Intravenous, Once **FOLLOWED BY** acetylcysteine (ACETADOTE) 2,760 mg in dextrose (D5W) 5 % 500 mL infusion, 50 mg/kg, Intravenous, Once **FOLLOWED BY** acetylcysteine (ACETADOTE) 6,000 mg in dextrose (D5W) 5 % 1,000 mL infusion, 6.25 mg/kg/hr, Intravenous, Continuous, Brunner, Mark I, MD    sennosides-docusate (PERICOLACE) 8.6-50 MG per tablet 2 tablet, 2 tablet, Oral, BID PRN **AND** polyethylene glycol (MIRALAX) packet 17 g, 17 g, Oral, Daily PRN **AND** bisacodyl (DULCOLAX) EC tablet 5 mg, 5 mg, Oral, Daily PRN **AND** bisacodyl (DULCOLAX) suppository 10 mg, 10 mg, Rectal, Daily PRN, Janette Shukla APRN    doxycycline (VIBRAMYCIN) 100 mg in sodium chloride 0.9 % 100 mL MBP, 100 mg, Intravenous, Q12H, Hay Waters PA, 100 mg at 09/28/24 1706    ondansetron (ZOFRAN) injection 4 mg, 4 mg, Intravenous, Q6H PRN, Janette Shukla APRN, 4 mg at 09/28/24 1707    phytonadione (AQUA-MEPHYTON, VITAMIN K) 10 mg in sodium chloride 0.9 % 50 mL IVPB, 10 mg, Intravenous, Once, Brunner, Mark I, MD    piperacillin-tazobactam (ZOSYN) 4.5 g IVPB in 100 mL NS MBP (CD), 4.5 g, Intravenous, Q8H, Janette Shukla, APRN, 4.5 g at 09/28/24 1157    sodium chloride 0.9 % flush 10 mL, 10 mL, Intravenous, Q12H, Janette Shukla APRN, 10 mL at 09/27/24 2244    sodium chloride 0.9  % flush 10 mL, 10 mL, Intravenous, PRN, Janette Shukla, DEANNA    sodium chloride 0.9 % infusion 40 mL, 40 mL, Intravenous, PRN, Janette Shukla APRN    sodium chloride 0.9 % infusion, 100 mL/hr, Intravenous, Continuous, Janette Shukla APRN, Last Rate: 100 mL/hr at 09/27/24 2245, 100 mL/hr at 09/27/24 2245    Antibiotics:  Anti-Infectives (From admission, onward)      Ordered     Dose/Rate Route Frequency Start Stop    09/28/24 1514  doxycycline (VIBRAMYCIN) 100 mg in sodium chloride 0.9 % 100 mL MBP        Ordering Provider: Hay Waters PA    100 mg  over 60 Minutes Intravenous Every 12 Hours 09/28/24 1800 10/05/24 1759    09/27/24 2153  piperacillin-tazobactam (ZOSYN) 4.5 g IVPB in 100 mL NS MBP (CD)        Note to Pharmacy: First dose given in ED   Ordering Provider: Janette Shukla APRN    4.5 g  over 4 Hours Intravenous Every 8 Hours 09/28/24 0300 10/03/24 0259    09/27/24 2215  vancomycin (VANCOCIN) 1,000 mg in sodium chloride 0.9 % 250 mL IVPB-VTB        Ordering Provider: Jamee Manriquez RPH    20 mg/kg × 55.2 kg  250 mL/hr over 60 Minutes Intravenous Once 09/27/24 2315 09/27/24 2343    09/27/24 2027  piperacillin-tazobactam (ZOSYN) 3.375 g IVPB in 100 mL NS MBP (CD)        Ordering Provider: Bacilio Espino MD    3.375 g  over 30 Minutes Intravenous Once 09/27/24 2100 09/27/24 2145              Review of Systems:  Constitutional-- + Fever, chills.  Appetite decreased, and no malaise. No fatigue.  HEENT-- No new vision, hearing or throat complaints.  No epistaxis or oral sores.  Denies odynophagia or dysphagia. photophobia or neck stiffness.  CV-- No chest pain,  Resp-- No SOB/cough  GI- + nausea, vomiting, no diarrhea.  No hematochezia, melena, or hematemesis. Denies jaundice or chronic liver disease.  -- No dysuria, hematuria, or flank pain.  Denies hesitancy, urgency or burning with urination.  Heme- No active bruising or bleeding;   MS-- no swelling or pain in the bones or joints of  arms/legs.  + lower back pain.  Neuro-- No acute focal weakness or numbness in the arms or legs.    Skin--No rashes or lesions      Physical Exam:   Vital Signs  Temp (24hrs), Av.2 °F (37.9 °C), Min:98.8 °F (37.1 °C), Max:103.1 °F (39.5 °C)    Temp  Min: 98.8 °F (37.1 °C)  Max: 103.1 °F (39.5 °C)  BP  Min: 94/63  Max: 114/74  Pulse  Min: 74  Max: 114  Resp  Min: 14  Max: 20  SpO2  Min: 94 %  Max: 99 %    GENERAL: Awake and alert, in no acute distress.   HEENT: Normocephalic, atraumatic.  PERRL. EOMI. No conjunctival injection. No icterus. Oropharynx clear without evidence of thrush or exudate.  NECK: Supple   HEART: RRR; No murmur, rubs, gallops.   LUNGS: Clear to auscultation bilaterally without wheezing, rales, rhonchi. Normal respiratory effort. Nonlabored.  ABDOMEN: Soft, nontender, nondistended. No rebound or guarding. NO mass or HSM.  EXT:  No cyanosis, clubbing or edema. No cord.  :  Without Su catheter.  MSK: No joint effusions or erythema  SKIN: Warm and dry without cutaneous eruptions on Inspection/palpation.    NEURO: Oriented to PPT.  Motor 5/5 strength  PSYCHIATRIC: Normal insight and judgment. Cooperative with PE    Laboratory Data    Results from last 7 days   Lab Units 24  0718 24  1801   WBC 10*3/mm3 2.89* 2.54*   HEMOGLOBIN g/dL 13.5 14.0   HEMATOCRIT % 39.7 40.9   PLATELETS 10*3/mm3 123* 132*     Results from last 7 days   Lab Units 24  0718   SODIUM mmol/L 137   POTASSIUM mmol/L 3.7   CHLORIDE mmol/L 103   CO2 mmol/L 23.0   BUN mg/dL 5*   CREATININE mg/dL 0.72   GLUCOSE mg/dL 77   CALCIUM mg/dL 8.3*     Results from last 7 days   Lab Units 24  0718   ALK PHOS U/L 178*   BILIRUBIN mg/dL 1.0   ALT (SGPT) U/L 917*   AST (SGOT) U/L 869*             Results from last 7 days   Lab Units 24  1801   LACTATE mmol/L 0.8     Results from last 7 days   Lab Units 24  0718   CK TOTAL U/L 57         Estimated Creatinine Clearance: 92.3 mL/min (by C-G formula based  on SCr of 0.72 mg/dL).      Microbiology:  Microbiology Results (last 10 days)       Procedure Component Value - Date/Time    MRSA Screen, PCR (Inpatient) - Swab, Nares [[id number]]  (Normal) Collected: 09/27/24 2250    Lab Status: Final result Specimen: Swab from Nares Updated: 09/28/24 0906     MRSA PCR Negative    Narrative:      The negative predictive value of this diagnostic test is high and should only be used to consider de-escalating anti-MRSA therapy. A positive result may indicate colonization with MRSA and must be correlated clinically.  MRSA Negative    Blood Culture - Blood, Arm, Right [[id number]]  (Normal) Collected: 09/27/24 1801    Lab Status: Preliminary result Specimen: Blood from Arm, Right Updated: 09/28/24 1916     Blood Culture No growth at 24 hours    COVID PRE-OP / PRE-PROCEDURE SCREENING ORDER (NO ISOLATION) - Swab, Nasopharynx [[id number]]  (Normal) Collected: 09/27/24 1728    Lab Status: Final result Specimen: Swab from Nasopharynx Updated: 09/27/24 1847    Narrative:      The following orders were created for panel order COVID PRE-OP / PRE-PROCEDURE SCREENING ORDER (NO ISOLATION) - Swab, Nasopharynx.  Procedure                               Abnormality         Status                     ---------                               -----------         ------                     COVID-19 and FLU A/B PCR...[[id number]]  Normal              Final result                 Please view results for these tests on the individual orders.    COVID-19 and FLU A/B PCR, 1 HR TAT - Swab, Nasopharynx [[id number]]  (Normal) Collected: 09/27/24 1728    Lab Status: Final result Specimen: Swab from Nasopharynx Updated: 09/27/24 1847     COVID19 Not Detected     Influenza A PCR Not Detected     Influenza B PCR Not Detected    Narrative:      Fact sheet for providers: https://www.fda.gov/media/615208/download    Fact sheet for patients: https://www.fda.gov/media/063432/download    Test performed by PCR.    Urine Culture  - Urine, Urine, Clean Catch [[id number]]  (Normal) Collected: 09/27/24 1728    Lab Status: Preliminary result Specimen: Urine, Clean Catch Updated: 09/28/24 1226     Urine Culture No growth    Respiratory Panel PCR w/COVID-19(SARS-CoV-2) ALBERTO/ALIN/HAKAN/PAD/COR/RASHI In-House, NP Swab in UTM/VTM, 2 HR TAT - Swab, Nasopharynx [[id number]]  (Normal) Collected: 09/27/24 1728    Lab Status: Final result Specimen: Swab from Nasopharynx Updated: 09/27/24 2200     ADENOVIRUS, PCR Not Detected     Coronavirus 229E Not Detected     Coronavirus HKU1 Not Detected     Coronavirus NL63 Not Detected     Coronavirus OC43 Not Detected     COVID19 Not Detected     Human Metapneumovirus Not Detected     Human Rhinovirus/Enterovirus Not Detected     Influenza A PCR Not Detected     Influenza B PCR Not Detected     Parainfluenza Virus 1 Not Detected     Parainfluenza Virus 2 Not Detected     Parainfluenza Virus 3 Not Detected     Parainfluenza Virus 4 Not Detected     RSV, PCR Not Detected     Bordetella pertussis pcr Not Detected     Bordetella parapertussis PCR Not Detected     Chlamydophila pneumoniae PCR Not Detected     Mycoplasma pneumo by PCR Not Detected    Narrative:      In the setting of a positive respiratory panel with a viral infection PLUS a negative procalcitonin without other underlying concern for bacterial infection, consider observing off antibiotics or discontinuation of antibiotics and continue supportive care. If the respiratory panel is positive for atypical bacterial infection (Bordetella pertussis, Chlamydophila pneumoniae, or Mycoplasma pneumoniae), consider antibiotic de-escalation to target atypical bacterial infection.                  Radiology:  Imaging Results (Last 72 Hours)       Procedure Component Value Units Date/Time    US Liver [[id number]] Collected: 09/28/24 1328     Updated: 09/28/24 1336    Narrative:      US LIVER    Date of Exam: 9/28/2024 5:34 AM EDT    Indication: elevated liver  enzymes.    Comparison: CT abdomen and pelvis 9/27/2024    Technique: Grayscale and color Doppler ultrasound evaluation of the right upper quadrant was performed.      Findings:  Visualized portions of the pancreas appear normal.    Normal appearance of the liver. No focal liver lesion. The portal vein is patent with hepatopetal flow. The middle hepatic vein is patent with normal waveform.    Normal appearance of the gallbladder.    Normal appearance of the common bile duct measuring 0.2 cm in diameter.    Normal appearance of the right kidney.      Impression:      Impression:  Normal right upper quadrant ultrasound.        Electronically Signed: oJhn Gallardo MD    9/28/2024 1:33 PM EDT    Workstation ID: [id number]    MRI Lumbar Spine With & Without Contrast [[id number]] Collected: 09/27/24 1938     Updated: 09/27/24 1945    Narrative:        MRI LUMBAR SPINE W WO CONTRAST    Date of Exam: 9/27/2024 6:43 PM EDT    Indication: sepsis, midline lumbar spine pain, eval for infectious process.     Comparison: None available.    Technique:  Routine multiplanar/multisequence sequence images of the lumbar spine were obtained before and after the uneventful administration of Multihance.        Findings:  There is normal height, alignment, and signal intensity of the lumbar vertebral bodies. Spinal cord terminates at the L1 level with normal signal seen within the conus.    Visualized paraspinal soft tissues appear within normal limits.    Postcontrast images demonstrate no pathologic contrast enhancement.    Axial images demonstrate:    L1/2: No significant disc bulge. Facet joints are within normal limits. No spinal canal stenosis or neural foraminal narrowing.    L2/3: No significant disc bulge. Facet joints appear within normal limits. No spinal canal stenosis or neural foraminal narrowing.    L3/4: No significant disc bulge. Facet joints are within normal limits. No spinal canal stenosis or neural foraminal  narrowing.    L4/5: No significant disc bulge. There are mild degenerative facet changes bilaterally. No spinal canal stenosis or neural foraminal narrowing.    5/S1: No significant disc bulge. There are mild degenerative facet changes left greater than right. No spinal canal stenosis or neural foraminal narrowing.      Impression:      Impression:    1. Mild degenerative facet change at the L4/5 and L5/S1 levels. There is no focal disc protrusion, spinal canal stenosis, or neural foraminal narrowing identified.        Electronically Signed: Sal Pacheco MD    9/27/2024 7:42 PM EDT    Workstation ID: [id number]    CT Abdomen Pelvis With Contrast [[id number]] Collected: 09/27/24 1826     Updated: 09/27/24 1835    Narrative:      CT ABDOMEN PELVIS W CONTRAST    Date of Exam: 9/27/2024 6:10 PM EDT    Indication: low back pain, fever, tachycardia.    Comparison: None available.    Technique: Axial CT images were obtained of the abdomen and pelvis following the uneventful intravenous administration of Isovue-300, 97 mL. Reconstructed coronal and sagittal images were also obtained. Automated exposure control and iterative   construction methods were used.      Findings:    Lung Bases:  The visualized lung bases and lower mediastinal structures are unremarkable.    Peritoneum:  No free intraperitoneal air is visualized. Small to moderate volume free fluid is visualized in the pelvis.    Abdominal wall:  Unremarkable.    Liver:  Liver is normal in size and contour. No focal lesions.    Biliary/Gallbladder:  The gallbladder is normal without evidence of radiopaque gallstones. The biliary tree is nondilated.    Pancreas:  Pancreas is within normal limits. There is no evidence of pancreatic mass or peripancreatic inflammatory changes.    Spleen:  Spleen is normal in size and contour.    Gastrointestinal/Mesentery:   No evidence of bowel obstruction or gross inflammatory changes. The appendix appears within normal limits.       Adrenals:  Adrenal glands are unremarkable.    Kidneys:  The kidneys are in anatomic position. No evidence of nephrolithiasis. No evidence of hydronephrosis or significant perinephric fat stranding.    Bladder:   The urinary bladder is unremarkable.    Reproductive organs:    The uterus and ovaries appear within millimeters on CT.    Lymph Nodes:  No significant adenopathy is identified.     Vasculature:  Unremarkable.    Osseous Structures:    No acute fracture or aggressive lesions.        Impression:      Impression:    Small to moderate volume free fluid in the pelvis. Finding may be physiologic and possibly secondary to recently ruptured ovarian cyst however, in the setting of fever or signs of sepsis, underlying infectious process not excluded. Clinically correlate.    Otherwise, unremarkable contrast-enhanced CT of the abdomen and pelvis.        Electronically Signed: Albert Chau MD    9/27/2024 6:32 PM EDT    Workstation ID: [id number]              Impression:   Sepsis- manifested by fever, tachycardia, leukopenia, thrombocytopenia, elevated procalcitonin, elevated  transaminases with unclear etiology.  She has a risk factor for exposure to ticks with pets that go outside.   This presentation is consistent with ehrlichiosis with transaminitis, leukopenia, and thrombocytopenia She has had a h/o STI with chlamydia, so will need STI work up as well.  Fevers, hectic  Lower back pain, with no focal infection seen by MRI.  Nausea and vomiting with CT scan that showed some free pelvic fluid that may be c/w ruptured ovarian cyst.  Leukopenia  Thrombocytopenia  Elevate liver transaminases with negative Hepatitis panel, worse.  Elevated procalcitonin, worse.      PLAN/RECOMMENDATIONS:   Thank you for asking us to see Madalyn Alexandre, I recommend the following:  Follow blood cultures.  Follow tick-borne work up  Follow STI work up, EBV, CMV. Monospot negative.   Check HCG test.  Continue Zosyn  Stop Vancomycin as  MRSA PCR was negative and no evidence of MRSA infection at this time.  Start Doxycycline 100 mg IV Q12H for empiric coverage for tick-borne illness    Ozzy Givens MD saw and examined patient, verified hx and PE, read all radiographic studies, reviewed labs and micro data, and formulated dx, plan for treatment and all medical decision making.      Hay Waters PA-C for Ozzy Givens MD       I discussed her complex situation with her and her mother in detail today.    Ozzy Givens MD  9/28/2024  19:30 EDT                    Electronically signed by Ozzy Givens MD at 09/28/24 1933       Brunner, Mark I, MD at 09/28/24 1332        Consult Orders    1. Inpatient Gastroenterology Consult [[id number]] ordered by Janette Shukla, DEANNA at 09/27/24 2140                 [facility] [place] [facility]astroenterology Consult    Referring Provider: Arti Baumann MD    PCP: Provider, No Known    Reason for Consultation: Abnormal LFTs.    Chief complaint: Back pain    History of present illness:    Madalyn Alexandre is a 24 y.o. female who is admitted with a 1 week history of back pain.  There was associated vomiting.  In the emergency room she was found to be febrile to 102.5.  She is found to have elevated LFTs mainly in a hepatocellular pattern, with AST and ALT around 600 and borderline alkaline phosphatase elevation of 203.  Bilirubin is normal. Rather extensive evaluation in the emergency room showed unremarkable spinal MRI, negative pelvic exam, negative monospot, neg HIV, and negative CT scan abdomen and pelvis (apart from a small amount of dependent peritoneal fluid). Respiratory PCR panel and hepatitis panel are negative.  For back pain, the patient recently started naproxen and cyclobenzaprine on 9/25/2024.  Patient has taken Tylenol around-the-clock since Wednesday, September 25, 2024 (exactly 4 g/day).  She does not drink alcohol.  Denies use of over-the-counter alternative medicine/herbal  remedies.    Allergies:  Vancomycin    Scheduled Meds:  piperacillin-tazobactam, 4.5 g, Intravenous, Q8H  sodium chloride, 10 mL, Intravenous, Q12H  vancomycin, 1,000 mg, Intravenous, Q12H    Infusions:  Pharmacy to dose vancomycin,   sodium chloride, 100 mL/hr, Last Rate: 100 mL/hr (09/27/24 3578)    PRN Meds:    acetaminophen    senna-docusate sodium **AND** polyethylene glycol **AND** bisacodyl **AND** bisacodyl    ondansetron    Pharmacy to dose vancomycin    sodium chloride    sodium chloride    Home Meds:  Medications Prior to Admission   Medication Sig Dispense Refill Last Dose    cyclobenzaprine (FLEXERIL) 10 MG tablet Take 1 tablet by mouth At Night As Needed for Muscle Spasms. 10 tablet 0     diclofenac (VOLTAREN) 75 MG EC tablet Take 1 tablet by mouth 2 (Two) Times a Day As Needed (pain). 20 tablet 0      ROS: Review of Systems   Constitutional:  Positive for activity change, appetite change, chills, fatigue and fever. Negative for unexpected weight change.   HENT:  Negative for mouth sores and trouble swallowing.    Respiratory:  Negative for cough, chest tightness and shortness of breath.    Cardiovascular:  Negative for chest pain and palpitations.   Gastrointestinal:  Positive for constipation, nausea and vomiting. Negative for abdominal pain, blood in stool and diarrhea.   Musculoskeletal:  Positive for back pain.   Skin:  Negative for color change.   Hematological:  Does not bruise/bleed easily.   Psychiatric/Behavioral: Negative.  Negative for agitation, behavioral problems and confusion.      PAST MED HX:  Past Medical History:   Diagnosis Date    Abnormal Pap smear of cervix 4/12/22    Chlamydia 10/06/2020    Elevated prolactin level     Irregular menses      PAST SURG HX:  Past Surgical History:   Procedure Laterality Date    NO PAST SURGERIES       FAM HX:  Family History   Problem Relation Age of Onset    No Known Problems Other     Diabetes type II Maternal Grandmother      SOC HX:  Social  "History     Socioeconomic History    Marital status: Single   Tobacco Use    Smoking status: Never    Smokeless tobacco: Never   Vaping Use    Vaping status: Former    Substances: Nicotine, Flavoring    Devices: Refillable tank   Substance and Sexual Activity    Alcohol use: No    Drug use: Not Currently     Types: Marijuana    Sexual activity: Yes     Partners: Male     Birth control/protection: Condom, None     Comment: occasional use     PHYSICAL EXAM  /74 (BP Location: Right arm, Patient Position: Lying)   Pulse 88   Temp 98.8 °F (37.1 °C)   Resp 16   Ht 152.4 cm (60\")   Wt 55.2 kg (121 lb 9.6 oz)   LMP 09/10/2024 (Exact Date)   SpO2 98%   BMI 23.75 kg/m²   Wt Readings from Last 3 Encounters:   09/27/24 55.2 kg (121 lb 9.6 oz)   09/25/24 68.9 kg (152 lb)   05/23/24 68.5 kg (151 lb)   ,body mass index is 23.75 kg/m².  Physical Exam  Constitutional:       General: She is not in acute distress.     Appearance: She is ill-appearing. She is not toxic-appearing or diaphoretic.   HENT:      Head: Normocephalic.      Nose: Nose normal. No congestion.      Mouth/Throat:      Mouth: Mucous membranes are moist.      Pharynx: No oropharyngeal exudate.   Eyes:      General: No scleral icterus.     Conjunctiva/sclera: Conjunctivae normal.   Cardiovascular:      Rate and Rhythm: Normal rate.      Pulses: Normal pulses.   Pulmonary:      Effort: Pulmonary effort is normal. No respiratory distress.   Abdominal:      General: Bowel sounds are normal.      Palpations: Abdomen is soft.      Tenderness: There is abdominal tenderness. There is no guarding.      Comments: Mildly tender in the epigastrium.   Musculoskeletal:      Cervical back: Normal range of motion.      Right lower leg: No edema.      Left lower leg: No edema.   Skin:     General: Skin is warm and dry.      Coloration: Skin is not jaundiced.   Neurological:      General: No focal deficit present.      Mental Status: She is alert and oriented to person, " place, and time.      Comments: No asterixis.   Psychiatric:         Mood and Affect: Mood normal.         Behavior: Behavior normal.     Results Review:   I reviewed the patient's new clinical results.    Lab Results   Component Value Date    WBC 2.89 (L) 09/28/2024    HGB 13.5 09/28/2024    HGB 14.0 09/27/2024    HCT 39.7 09/28/2024    MCV 93.2 09/28/2024     (L) 09/28/2024       Lab Results   Component Value Date    INR 1.33 (H) 09/27/2024       Lab Results   Component Value Date    GLUCOSE 77 09/28/2024    BUN 5 (L) 09/28/2024    CREATININE 0.72 09/28/2024    BCR 6.9 (L) 09/28/2024     09/28/2024    K 3.7 09/28/2024    CO2 23.0 09/28/2024    CALCIUM 8.3 (L) 09/28/2024    ALBUMIN 3.6 09/28/2024    ALKPHOS 178 (H) 09/28/2024    BILITOT 1.0 09/28/2024     (H) 09/28/2024     (H) 09/28/2024      Latest Reference Range & Units 09/27/24 18:01   Ethanol 0 - 10 mg/dL <10   Acetaminophen 0.0 - 30.0 mcg/mL 8.5      Latest Reference Range & Units 09/27/24 20:39   Hep A IgM Non-Reactive  Non-Reactive   Hepatitis B Surface Ag Non-Reactive  Non-Reactive   Hep B Core IgM Non-Reactive  Non-Reactive   Hepatitis C Ab Non-Reactive  Non-Reactive      Latest Reference Range & Units 09/27/24 20:39   Monospot Negative  Negative   HIV-1/ HIV-2 Ab Non-Reactive  Non-Reactive     ASSESSMENTS/PLANS    Acute marked transaminase elevation.   Fever.  Back pain. MRI spine unremarkable. No evidence for pyelonephritis on CT. Urine culture NGTD.  Leukopenia with left shift and absolute lymphocytopenia.  Thrombocytopenia.  Mild coagulopathy.    Favor viral syndrome. No evidence for PVT or Budd chiari on portal duplex. Drug-induced liver injury from naproxen is possible, but rare. Will screen for autoimmune hepatitis, rhabdomyolysis, Lobo's, and vasoactive illicit drugs.    >> Avoid naproxen.  >> Autoimmune markers.  >> Ceruloplasmin.  >> CK.  >> Drug screen.  >> IV vitamin K and repeat INR tomorrow.  >> Doubt  acetaminophen toxicity, however, given coagulopathy and worsening LFTs will begin N-acetylcysteine protocol.    I discussed the patient's findings and my recommendations with patient and her mother present in the room.    Mark I. Brunner, MD  09/28/24  13:32 EDT      Electronically signed by Brunner, Mark I, MD at 09/28/24 9872

## 2024-09-30 NOTE — PROGRESS NOTES
[facility] Medicine Services  PROGRESS NOTE    Patient Name: Madalyn Alexandre  : 2000  MRN: [id number]    Date of Admission: 2024  Primary Care Physician: Provider, No Known    Subjective   Subjective     CC: fever    HPI:  Feels okay today, still febrile overnight as she was unable to get NSAIDs or Tylenol.  Still nauseated at times as well.        Objective   Objective     Vital Signs:   Temp:  [99.1 °F (37.3 °C)-102.7 °F (39.3 °C)] 102 °F (38.9 °C)  Heart Rate:  [60-88] 87  Resp:  [16-18] 16  BP: (117-127)/(64-83) 127/83     Physical Exam:  Constitutional: No acute distress, awake, alert  HENT: NCAT, mucous membranes moist  Respiratory: Clear to auscultation bilaterally, respiratory effort normal   Cardiovascular: RRR, no murmurs, rubs, or gallops  Gastrointestinal: Positive bowel sounds, soft, nontender, nondistended  Musculoskeletal: No bilateral ankle edema  Psychiatric: Appropriate affect, cooperative  Neurologic: Oriented x 3, strength symmetric in all extremities, Cranial Nerves grossly intact to confrontation, speech clear  Skin: No rashes   Results Reviewed:  LAB RESULTS:      Lab 24  0041 24  0640 24  0718 24  2225 24  1801   WBC 4.10 3.42 2.89*  --  2.54*   HEMOGLOBIN 13.0 13.6 13.5  --  14.0   HEMATOCRIT 37.4 39.4 39.7  --  40.9   PLATELETS 118* 133* 123*  --  132*   NEUTROS ABS 2.67 2.77 2.40  --  2.07   IMMATURE GRANS (ABS)  --   --   --   --  0.01   LYMPHS ABS  --   --   --   --  0.31*   MONOS ABS  --   --   --   --  0.13   EOS ABS 0.00 0.00 0.00  --  0.01   MCV 91.7 92.1 93.2  --  93.0   PROCALCITONIN  --   --  0.96*  --  0.69*   LACTATE  --   --   --   --  0.8   PROTIME 18.1* 16.0*  --  16.6*  --          Lab 24  0041 24  0640 24  0718 24  1801   SODIUM 133* 135* 137 136   POTASSIUM 3.5 3.3* 3.7 3.6   CHLORIDE 96* 100 103 102   CO2 21.0* 22.0 23.0 24.0   ANION GAP 16.0* 13.0 11.0 10.0   BUN 4* 4* 5* 6    CREATININE 0.60 0.75 0.72 0.77   EGFR 127.1 112.8 118.4 109.3   GLUCOSE 108* 92 77 117*   CALCIUM 8.2* 8.0* 8.3* 8.7   MAGNESIUM  --   --  2.4  --          Lab 09/30/24  0041 09/29/24  0640 09/28/24  0718 09/27/24  1801   TOTAL PROTEIN 6.1 6.5 6.1 6.9   ALBUMIN 3.5 3.5 3.6 4.0   GLOBULIN 2.6 3.0 2.5 2.9   ALT (SGPT) 5,626* 2,972* 917* 614*   AST (SGOT) 6,391* 3,202* 869* 614*   BILIRUBIN 2.8* 2.1* 1.0 0.6   ALK PHOS 228* 251* 178* 203*   LIPASE  --   --   --  25         Lab 09/30/24  0041 09/29/24  0640 09/27/24  2225   PROTIME 18.1* 16.0* 16.6*   INR 1.49* 1.27* 1.33*                 Brief Urine Lab Results  (Last result in the past 365 days)        Color   Clarity   Blood   Leuk Est   Nitrite   Protein   CREAT   Urine HCG        09/28/24 1825               Negative               Microbiology Results Abnormal       Procedure Component Value - Date/Time    Blood Culture - Blood, Wrist, Right [[id number]]  (Normal) Collected: 09/27/24 2039    Lab Status: Preliminary result Specimen: Blood from Wrist, Right Updated: 09/29/24 2131     Blood Culture No growth at 2 days    Blood Culture - Blood, Arm, Right [[id number]]  (Normal) Collected: 09/27/24 1801    Lab Status: Preliminary result Specimen: Blood from Arm, Right Updated: 09/29/24 1916     Blood Culture No growth at 2 days    Urine Culture - Urine, Urine, Clean Catch [[id number]] Collected: 09/27/24 1728    Lab Status: Final result Specimen: Urine, Clean Catch Updated: 09/29/24 0941     Urine Culture >100,000 CFU/mL Mixed Beena Isolated    Narrative:      Specimen contains mixed organisms of questionable pathogenicity suggestive of contamination. If symptoms persist, suggest recollection.  Colonization of the urinary tract without infection is common. Treatment is discouraged unless the patient is symptomatic, pregnant, or undergoing an invasive urologic procedure.    MRSA Screen, PCR (Inpatient) - Swab, Nares [[id number]]  (Normal) Collected: 09/27/24 2250    Lab  Status: Final result Specimen: Swab from Nares Updated: 09/28/24 0906     MRSA PCR Negative    Narrative:      The negative predictive value of this diagnostic test is high and should only be used to consider de-escalating anti-MRSA therapy. A positive result may indicate colonization with MRSA and must be correlated clinically.  MRSA Negative    Respiratory Panel PCR w/COVID-19(SARS-CoV-2) ALBERTO/ALIN/HAKAN/PAD/COR/RASHI In-House, NP Swab in UTM/VTM, 2 HR TAT - Swab, Nasopharynx [[id number]]  (Normal) Collected: 09/27/24 1728    Lab Status: Final result Specimen: Swab from Nasopharynx Updated: 09/27/24 2200     ADENOVIRUS, PCR Not Detected     Coronavirus 229E Not Detected     Coronavirus HKU1 Not Detected     Coronavirus NL63 Not Detected     Coronavirus OC43 Not Detected     COVID19 Not Detected     Human Metapneumovirus Not Detected     Human Rhinovirus/Enterovirus Not Detected     Influenza A PCR Not Detected     Influenza B PCR Not Detected     Parainfluenza Virus 1 Not Detected     Parainfluenza Virus 2 Not Detected     Parainfluenza Virus 3 Not Detected     Parainfluenza Virus 4 Not Detected     RSV, PCR Not Detected     Bordetella pertussis pcr Not Detected     Bordetella parapertussis PCR Not Detected     Chlamydophila pneumoniae PCR Not Detected     Mycoplasma pneumo by PCR Not Detected    Narrative:      In the setting of a positive respiratory panel with a viral infection PLUS a negative procalcitonin without other underlying concern for bacterial infection, consider observing off antibiotics or discontinuation of antibiotics and continue supportive care. If the respiratory panel is positive for atypical bacterial infection (Bordetella pertussis, Chlamydophila pneumoniae, or Mycoplasma pneumoniae), consider antibiotic de-escalation to target atypical bacterial infection.    COVID PRE-OP / PRE-PROCEDURE SCREENING ORDER (NO ISOLATION) - Swab, Nasopharynx [[id number]]  (Normal) Collected: 09/27/24 1728    Lab  Status: Final result Specimen: Swab from Nasopharynx Updated: 09/27/24 1847    Narrative:      The following orders were created for panel order COVID PRE-OP / PRE-PROCEDURE SCREENING ORDER (NO ISOLATION) - Swab, Nasopharynx.  Procedure                               Abnormality         Status                     ---------                               -----------         ------                     COVID-19 and FLU A/B PCR...[[id number]]  Normal              Final result                 Please view results for these tests on the individual orders.    COVID-19 and FLU A/B PCR, 1 HR TAT - Swab, Nasopharynx [[id number]]  (Normal) Collected: 09/27/24 1728    Lab Status: Final result Specimen: Swab from Nasopharynx Updated: 09/27/24 1847     COVID19 Not Detected     Influenza A PCR Not Detected     Influenza B PCR Not Detected    Narrative:      Fact sheet for providers: https://www.fda.gov/media/451816/download    Fact sheet for patients: https://www.fda.gov/media/900360/download    Test performed by PCR.            No radiology results from the last 24 hrs        Current medications:  Scheduled Meds:doxycycline, 100 mg, Intravenous, Q12H  sodium chloride, 10 mL, Intravenous, Q12H      Continuous Infusions:acetylcysteine (ACETADOTE) 6000 mg in D5W infusion (NON-APAP LIVER FAILURE) Third Dose, 6.25 mg/kg/hr, Last Rate: 6.25 mg/kg/hr (09/29/24 1616)  sodium chloride, 100 mL/hr, Last Rate: 100 mL/hr (09/30/24 0837)      PRN Meds:.  senna-docusate sodium **AND** polyethylene glycol **AND** bisacodyl **AND** bisacodyl    ondansetron    prochlorperazine    sodium chloride    sodium chloride    Assessment & Plan   Assessment & Plan     Active Hospital Problems    Diagnosis  POA    **Sepsis [A41.9]  Yes    Elevated liver enzymes [R74.8]  Yes    Acute bilateral low back pain without sciatica [M54.50]  Yes    Elevated prolactin level [R79.89]  Yes      Resolved Hospital Problems   No resolved problems to display.        Brief  Hospital Course to date:  Madalyn Alexandre is a 24 y.o. female with no significant PMH other than STI (Chlamydia) in 2020 who presented with fever of unknown etiology. She was found to have fever on admission with elevated transaminases and thrombocytopenia. At this time, leading diagnosis is tick borne illness, however, pt continues to have fevers and LFTs have continued to worsen.  Given her worsening liver function, feel it is better for her to be at a transplant center for closer monitoring and I have spoken with  who have accepted her for transfer.         Sepsis of unclear etiology: Leukopenia, fever, tachycardia, elevated procalcitonin.    - IV fluid bolus given in the ED, continue gentle IV fluid  - Vancomycin and Zosyn started in the ED, stopped Vanc given negative MRSA PCR.  ID added on Doxycycline   - BC x 2 NGTD  - Urine culture NGTD  - ID consulted, appreciate Dr. Givens   - Mary, HIV negative  - CBC, CMP in the a.m.  -- Patient with pelvic exam in the ED, later informed apparently swab was not sent to lab, patient does have previous history of Chlamydia, ? Repeat pelvic exam however discussed with Dr. Espino who did Pelvic exam, no discharge, no tenderness, cervix appeared normal   -- Free fluid is noted on CT abd/pelvis though, consider possible GYN consult but no abdominal pain or complaints on exam at this time as thought to possibly be secondary to recently ruptured ovarian cyst   -- urine GC/chlamydia PENDING  -- need to rule out tick borne illness given below, sent for Ehrlichia, lyme, etc- no tick exposure per patient but she does have a dog who is outside frequently per ID   -- continues to be febrile and unable to tolerate acetaminophen and/or much ibuprofen due to below.  Okay for one time dose of Motrin this am per GI.  Continue ice packs, cooling blankets as needed.       Acutely elevated liver enzymes  Coagulopathy   Thrombocytopenia (mild)  - Acute hep panel negative  - CT  abdomen/pelvis negative for acute findings besides some small to moderate free fluid in pelvis as above   - Liver ultrasound unremarkable   - GI consulted, appreciate Dr. Brunner's assistance  -- LFTs continue to worsen to 6400/5000 AST/ALT this am and tbili worse. INR also now worse at 1.49.    -- discussed with Dr. Brunner and advised transfer to a facility with transplant services available.  I called over to  and discussed with Dr. Eliceo Lutz (Internal Medicine) who accepted the patient.  -- duplex ordered to evaluate for Budd-Chiari, etc.   -- checking for tick borne illnesses as noted above, EBV/CMV PENDING   -- of note, no offending medications/toxin exposures (pt denies any supplement use other than biotin and collagen. She denies any OCP/hormonal therapies).      Low back pain  - MRI negative for acute findings  - Tylenol now on hold due to above       Total time spent: Time Spent: Time Spent: 50 minutes  Time spent includes time reviewing chart, face-to-face time, counseling patient/family/caregiver, ordering medications/tests/procedures, communicating with other health care professionals, documenting clinical information in the electronic health record, and coordination of care.    Expected Discharge Location and Transportation: transfer to  when bed available   Expected Discharge   Expected Discharge Date: 10/1/2024; Expected Discharge Time:      VTE Prophylaxis:  Mechanical VTE prophylaxis orders are present.         AM-PAC 6 Clicks Score (PT): 24 (09/29/24 2000)    CODE STATUS:   Code Status and Medical Interventions: CPR (Attempt to Resuscitate); Full Support   Ordered at: 09/27/24 2148     Code Status (Patient has no pulse and is not breathing):    CPR (Attempt to Resuscitate)     Medical Interventions (Patient has pulse or is breathing):    Full Support       Ellie Baumann MD  09/30/24

## 2024-09-30 NOTE — PAYOR COMM NOTE
"Mae Phillips RN  [facility]  Utilization Management  P:[phone]  F:[phone]    Auth # [id number]   Fax 1 of 2    Madalyn Madrigal \"Rosalva\" (24 y.o. Female)       Date of Birth   2000    Social Security Number       Address   [address]  [address] [place] [address]    Home Phone   [phone]    MRN   [id number]       Islam   Unknown    Marital Status   Single                            Admission Date   24    Admission Type   Emergency    Admitting Provider   Ellie Bamuann MD    Attending Provider   Ellie Baumann MD    Department, Room/Bed   [facility] 2F, S212/       Discharge Date       Discharge Disposition       Discharge Destination                                 Attending Provider: Ellie Baumann MD    Allergies: Vancomycin    Isolation: None   Infection: None   Code Status: CPR    Ht: 152.4 cm (60\")   Wt: 55.2 kg (121 lb 9.6 oz)    Admission Cmt: None   Principal Problem: Sepsis [A41.9]                   Active Insurance as of 2024       Primary Coverage       Payor Plan Insurance Group Employer/Plan Group    [employer] BLUE CROSS AN[employer] BLUE CROSS BLUE SHIELD PPO [id number]       Payor Plan Address Payor Plan Phone Number Payor Plan Fax Number Effective Dates    [address] [phone]  2024 - None Entered    [place] [address]         Subscriber Name Subscriber Birth Date Member ID       MADALYN MADRIGAL 2000 [id number]                   History & Physical        TOM Munguia, DO at [address] [place] [address] Medicine Services  HISTORY AND PHYSICAL    Patient Name: Madalyn Madrigal  : 2000  MRN: [id number]  Primary Care Physician: Provider, No Known  Date of admission: 2024    Subjective  Subjective     Chief Complaint:  Back pain, vomiting, fever     HPI:  Madalyn Madrigal is a 24 y.o. female who presents to the ED with complaint of  back pain, vomiting, " fever.  She states that 3 days ago she developed low back pain.  She then had acute onset of fever with associated nausea and vomiting.  She was evaluated at [facility] who prescribed muscle relaxers which she notes does help the back pain but then it returns when the medication wears off.  Upon arrival to the ED, labs are concerning for elevated liver enzymes, elevated procalcitonin and leukopenia.  CT abdomen/pelvis notes small to moderate volume free fluid in the pelvis otherwise no acute findings.  MRI lumbar spine is negative for acute findings.  She will be admitted to hospital medicine for further evaluation.    Review of Systems   Constitutional:  Positive for activity change, appetite change, chills, fatigue and fever. Negative for diaphoresis and unexpected weight change.   HENT: Negative.  Negative for congestion, sinus pressure, sore throat and trouble swallowing.    Eyes: Negative.  Negative for visual disturbance.   Respiratory: Negative.  Negative for cough, chest tightness, shortness of breath and wheezing.    Cardiovascular:  Negative for chest pain, palpitations and leg swelling.   Gastrointestinal:  Positive for nausea and vomiting. Negative for abdominal distention, abdominal pain, constipation and diarrhea.   Endocrine: Negative.  Negative for polydipsia and polyphagia.   Genitourinary: Negative.  Negative for decreased urine volume, difficulty urinating, dysuria, frequency and urgency.   Musculoskeletal:  Positive for back pain. Negative for arthralgias, gait problem, joint swelling, myalgias, neck pain and neck stiffness.   Skin: Negative.  Negative for color change, pallor, rash and wound.   Allergic/Immunologic: Negative.  Negative for immunocompromised state.   Neurological: Negative.  Positive for headaches (not persistent). Negative for dizziness, tremors, seizures, syncope, facial asymmetry, speech difficulty, weakness, light-headedness and numbness.   Hematological: Negative.  Does not  bruise/bleed easily.   Psychiatric/Behavioral: Negative.  Negative for confusion. The patient is not nervous/anxious.         Personal History     Past Medical History:   Diagnosis Date    Abnormal Pap smear of cervix 4/12/22    Chlamydia 10/06/2020    Elevated prolactin level     Irregular menses        Past Surgical History:   Procedure Laterality Date    NO PAST SURGERIES         Family History:  family history includes Diabetes type II in her maternal grandmother; No Known Problems in an other family member.     Social History:  reports that she has never smoked. She has never used smokeless tobacco. She reports that she does not currently use drugs after having used the following drugs: Marijuana. She reports that she does not drink alcohol.  Social History     Social History Narrative    Not on file       Medications:  cyclobenzaprine, diclofenac, and norethindrone-ethinyl estradiol FE    No Known Allergies    Objective  Objective     Vital Signs:   Temp:  [100.5 °F (38.1 °C)-102.5 °F (39.2 °C)] 100.5 °F (38.1 °C)  Heart Rate:  [101-121] 101  Resp:  [18] 18  BP: (100-126)/(63-83) 105/67    Physical Exam   Constitutional: Awake, alert, ill appearing   Eyes: PERRLA, sclerae anicteric, no conjunctival injection  HENT: NCAT, mucous membranes moist  Neck: Supple, no thyromegaly, no lymphadenopathy, trachea midline  Respiratory: Clear to auscultation bilaterally, nonlabored respirations   Cardiovascular: tachycardic, no murmurs, rubs, or gallops, palpable pedal pulses bilaterally  Gastrointestinal: Positive bowel sounds, soft, nontender, nondistended  Musculoskeletal: No bilateral ankle edema, no clubbing or cyanosis to extremities  Psychiatric: Appropriate affect, cooperative  Neurologic: Oriented x 3, strength symmetric in all extremities, Cranial Nerves grossly intact to confrontation, speech clear  Skin: No rashes      Result Review:  I have personally reviewed the results from the time of this admission to  9/27/2024 21:48 EDT and agree with these findings:  [x]  Laboratory list / accordion  [x]  Microbiology  [x]  Radiology  [x]  EKG/Telemetry   []  Cardiology/Vascular   []  Pathology  [x]  Old records  []  Other:  Most notable findings include:     LAB RESULTS:      Lab 09/27/24  1801   WBC 2.54*   HEMOGLOBIN 14.0   HEMATOCRIT 40.9   PLATELETS 132*   NEUTROS ABS 2.07   IMMATURE GRANS (ABS) 0.01   LYMPHS ABS 0.31*   MONOS ABS 0.13   EOS ABS 0.01   MCV 93.0   PROCALCITONIN 0.69*   LACTATE 0.8         Lab 09/27/24  1801   SODIUM 136   POTASSIUM 3.6   CHLORIDE 102   CO2 24.0   ANION GAP 10.0   BUN 6   CREATININE 0.77   EGFR 109.3   GLUCOSE 117*   CALCIUM 8.7         Lab 09/27/24  1801   TOTAL PROTEIN 6.9   ALBUMIN 4.0   GLOBULIN 2.9   ALT (SGPT) 614*   AST (SGOT) 614*   BILIRUBIN 0.6   ALK PHOS 203*   LIPASE 25                     Brief Urine Lab Results  (Last result in the past 365 days)        Color   Clarity   Blood   Leuk Est   Nitrite   Protein   CREAT   Urine HCG        09/27/24 1733               Negative             Microbiology Results (last 10 days)       Procedure Component Value - Date/Time    COVID PRE-OP / PRE-PROCEDURE SCREENING ORDER (NO ISOLATION) - Swab, Nasopharynx [[id number]]  (Normal) Collected: 09/27/24 1728    Lab Status: Final result Specimen: Swab from Nasopharynx Updated: 09/27/24 1847    Narrative:      The following orders were created for panel order COVID PRE-OP / PRE-PROCEDURE SCREENING ORDER (NO ISOLATION) - Swab, Nasopharynx.  Procedure                               Abnormality         Status                     ---------                               -----------         ------                     COVID-19 and FLU A/B PCR...[[id number]]  Normal              Final result                 Please view results for these tests on the individual orders.    COVID-19 and FLU A/B PCR, 1 HR TAT - Swab, Nasopharynx [[id number]]  (Normal) Collected: 09/27/24 1728    Lab Status: Final result Specimen:  Swab from Nasopharynx Updated: 09/27/24 1847     COVID19 Not Detected     Influenza A PCR Not Detected     Influenza B PCR Not Detected    Narrative:      Fact sheet for providers: https://www.fda.gov/media/185338/download    Fact sheet for patients: https://www.fda.gov/media/770062/download    Test performed by PCR.            MRI Lumbar Spine With & Without Contrast    Result Date: 9/27/2024  MRI LUMBAR SPINE W WO CONTRAST Date of Exam: 9/27/2024 6:43 PM EDT Indication: sepsis, midline lumbar spine pain, eval for infectious process.  Comparison: None available. Technique:  Routine multiplanar/multisequence sequence images of the lumbar spine were obtained before and after the uneventful administration of Multihance.  Findings: There is normal height, alignment, and signal intensity of the lumbar vertebral bodies. Spinal cord terminates at the L1 level with normal signal seen within the conus. Visualized paraspinal soft tissues appear within normal limits. Postcontrast images demonstrate no pathologic contrast enhancement. Axial images demonstrate: L1/2: No significant disc bulge. Facet joints are within normal limits. No spinal canal stenosis or neural foraminal narrowing. L2/3: No significant disc bulge. Facet joints appear within normal limits. No spinal canal stenosis or neural foraminal narrowing. L3/4: No significant disc bulge. Facet joints are within normal limits. No spinal canal stenosis or neural foraminal narrowing. L4/5: No significant disc bulge. There are mild degenerative facet changes bilaterally. No spinal canal stenosis or neural foraminal narrowing. 5/S1: No significant disc bulge. There are mild degenerative facet changes left greater than right. No spinal canal stenosis or neural foraminal narrowing.     Impression: Impression: 1. Mild degenerative facet change at the L4/5 and L5/S1 levels. There is no focal disc protrusion, spinal canal stenosis, or neural foraminal narrowing identified.  Electronically Signed: Sal Pacheco MD  9/27/2024 7:42 PM EDT  Workstation ID: [id number]    CT Abdomen Pelvis With Contrast    Result Date: 9/27/2024  CT ABDOMEN PELVIS W CONTRAST Date of Exam: 9/27/2024 6:10 PM EDT Indication: low back pain, fever, tachycardia. Comparison: None available. Technique: Axial CT images were obtained of the abdomen and pelvis following the uneventful intravenous administration of Isovue-300, 97 mL. Reconstructed coronal and sagittal images were also obtained. Automated exposure control and iterative construction methods were used. Findings: Lung Bases: The visualized lung bases and lower mediastinal structures are unremarkable. Peritoneum: No free intraperitoneal air is visualized. Small to moderate volume free fluid is visualized in the pelvis. Abdominal wall: Unremarkable. Liver: Liver is normal in size and contour. No focal lesions. Biliary/Gallbladder: The gallbladder is normal without evidence of radiopaque gallstones. The biliary tree is nondilated. Pancreas: Pancreas is within normal limits. There is no evidence of pancreatic mass or peripancreatic inflammatory changes. Spleen: Spleen is normal in size and contour. Gastrointestinal/Mesentery: No evidence of bowel obstruction or gross inflammatory changes. The appendix appears within normal limits.  Adrenals: Adrenal glands are unremarkable. Kidneys: The kidneys are in anatomic position. No evidence of nephrolithiasis. No evidence of hydronephrosis or significant perinephric fat stranding. Bladder: The urinary bladder is unremarkable. Reproductive organs:  The uterus and ovaries appear within millimeters on CT. Lymph Nodes: No significant adenopathy is identified. Vasculature: Unremarkable. Osseous Structures:  No acute fracture or aggressive lesions.     Impression: Impression: Small to moderate volume free fluid in the pelvis. Finding may be physiologic and possibly secondary to recently ruptured ovarian cyst however, in the  setting of fever or signs of sepsis, underlying infectious process not excluded. Clinically correlate. Otherwise, unremarkable contrast-enhanced CT of the abdomen and pelvis. Electronically Signed: Albert Chau MD  9/27/2024 6:32 PM EDT  Workstation ID: [id number]         Assessment & Plan  Assessment & Plan       Sepsis    Elevated prolactin level    Elevated liver enzymes    Acute bilateral low back pain without sciatica    24 y.o. female who presents to the ED with complaint of 5 back pain, vomiting, fever.    Sepsis: Leukopenia, fever, tachycardia, elevated procalcitonin.  Source: Unclear  - IV fluid bolus given in the ED, continue gentle IV fluid  - CBC in the a.m.  - Vancomycin and Zosyn started in the ED, continue for now  - BC x 2 pending  - Urine culture pending  - ID consult for evaluation in the AM   - Monospot, HIV negative  - CBC, CMP in the a.m.  -- Patient with pelvic exam in the ED, later informed apparently swab was not sent to lab, patient does have previous history of Chlamydia, ? Repeat pelvic exam however discussed with Dr. Espino who did Pelvic exam, no discharge, no tenderness, cervix appeared normal   -- Free fluid is noted on CT abd/pelvis though, consider possible GYN consult but no abdominal pain or complaints on exam at this time as thought to possibly be secondary to recently ruptured ovarian cyst     Elevated liver enzymes  Thrombocytopenia (mild)  - Acute hep panel negative  - CT abdomen/pelvis negative for acute findings besides some small to moderate free fluid in pelvis as above   - Liver ultrasound in the a.m.  - GI consult in the a.m.  - CMP in the a.m.    Low back pain  - MRI negative for acute findings  - Tylenol as needed    DVT prophylaxis: SCDs    CODE STATUS:   Code Status (Patient has no pulse and is not breathing): CPR (Attempt to Resuscitate)  Medical Interventions (Patient has pulse or is breathing): Full Support      Expected Discharge  Expected Discharge Date:  9/30/2024; Expected Discharge Time:       This note has been completed as part of a split-shared workflow.     Signature: Electronically signed by DEANNA Hunter, 09/27/24, 9:48 PM EDT.              Attending   Admission Attestation       I have performed an independent face-to-face diagnostic evaluation including performing an independent physical examination.  I approve of the documented plan of care above that was reviewed and developed with the advanced practice clinician (APC) and take responsibility for that plan along with its associated risks.  I have updated the HPI as appropriate.    Brief HPI    Is a 24-year-old female seen and examined by me this evening, agree with above documentation via DEANNA Greenfield. Patient reports overall feeling better during my exam, reports back pain currently improved. She denies any pelvic pain, dysuria, or urinary discharge. Patient had pelvic exam in the ED by ED provider. She denies any other new acute complaints or problems.     Attending Physical Exam:  Temp:  [99.8 °F (37.7 °C)-102.5 °F (39.2 °C)] 99.8 °F (37.7 °C)  Heart Rate:  [] 86  Resp:  [18] 18  BP: ()/(57-83) 97/57    Constitutional: No acute distress, awake, alert, resting comfortably in bed, on RA  HENT: NCAT, nares patent, mucous membranes moist  Respiratory: Clear to auscultation bilaterally, no rhonchi or wheezing, respiratory effort normal   Cardiovascular: RRR, no murmurs, rubs, or gallops  Gastrointestinal: Positive bowel sounds, soft, nontender, nondistended  Musculoskeletal: No bilateral ankle edema, no clubbing or cyaonsis   Psychiatric: Appropriate affect, cooperative  Neurologic: Oriented x 3, strength symmetric in all extremities, Cranial Nerves grossly intact to confrontation, speech clear  Skin: No rashes      Result Review:  I have personally reviewed the results from the time of this admission to 9/28/2024 03:33 EDT and agree with these findings:  [x]  Laboratory list /  accordion  [x]  Microbiology  [x]  Radiology  []  EKG/Telemetry   []  Cardiology/Vascular   []  Pathology  []  Old records  []  Other:  Most notable findings include:       WBC 2.54   INR 1.33   Hepatitis panel negative, HIV Negative, Monospot negative   Resp panel negative   CT abd/pelvis- small to moderate free fluid in pelvis, ? Possibly secondary to to ruptured ovarian cyst, underlying infectious process not excluded     Assessment and Plan:  Sepsis, Fever of unknown origin   Elevated LFTs, abdominal pain  - plans for evaluation by both ID and GI at this time, currently on IV Vancomycin and Zosyn   - Patient does have previous history of chlamydia, she has had pelvic exam by Dr. Espino, question of free fluid on CT abd/pelvis though, concerns of possible recent ruptured ovarian cyst however abdomen is currently not tender on exam, will ask GYN to evaluate as well.        See assessment and plan documented by APC above and updated/edited by me as appropriate.    RICKY Munguia DO  09/28/24                  Electronically signed by TOM Munguia DO at 09/28/24 0357          Emergency Department Notes        Sophie Celis, RN at 09/27/24 2057           Madalyn Cleghorn    Nursing Report ED to Floor:  Mental status: GCS 15  Ambulatory status: standby assist  Oxygen Therapy:  RA  Cardiac Rhythm: sinus tach  Admitted from: home  Safety Concerns:  none known  Social Issues: none known  ED Room #:  9    ED Nurse Phone Extension - [phone] or may call [phone].      HPI:   Chief Complaint   Patient presents with    Back Pain       Past Medical History:  Past Medical History:   Diagnosis Date    Abnormal Pap smear of cervix 4/12/22    Chlamydia 10/06/2020    Elevated prolactin level     Irregular menses         Past Surgical History:  Past Surgical History:   Procedure Laterality Date    NO PAST SURGERIES          Admitting Doctor:   TOM Munguia DO    Consulting Provider(s):  Consults       No orders  found from 8/29/2024 to 9/28/2024.             Admitting Diagnosis:   The primary encounter diagnosis was Acute sepsis. Diagnoses of Acute midline low back pain without sciatica, Nausea and vomiting, unspecified vomiting type, Leukopenia, unspecified type, and Abnormal LFTs were also pertinent to this visit.    Most Recent Vitals:   Vitals:    09/27/24 2015 09/27/24 2019 09/27/24 2030 09/27/24 2034   BP: 102/72  108/69    BP Location:       Patient Position:       Pulse:  108 114 110   Resp:       Temp:       TempSrc:       SpO2:  99% 94% 97%   Weight:       Height:           Active LDAs/IV Access:   Lines, Drains & Airways       Active LDAs       Name Placement date Placement time Site Days    Peripheral IV 09/27/24 1800 Right Antecubital 09/27/24  1800  Antecubital  less than 1                    Labs (abnormal labs have a star):   Labs Reviewed   COMPREHENSIVE METABOLIC PANEL - Abnormal; Notable for the following components:       Result Value    Glucose 117 (*)     ALT (SGPT) 614 (*)     AST (SGOT) 614 (*)     Alkaline Phosphatase 203 (*)     All other components within normal limits    Narrative:     GFR Normal >60  Chronic Kidney Disease <60  Kidney Failure <15     URINALYSIS W/ CULTURE IF INDICATED - Abnormal; Notable for the following components:    Color, UA Dark Yellow (*)     Appearance, UA Cloudy (*)     Ketones, UA 15 mg/dL (1+) (*)     Protein,  mg/dL (2+) (*)     Leuk Esterase, UA Trace (*)     All other components within normal limits    Narrative:     In absence of clinical symptoms, the presence of pyuria, bacteria, and/or nitrites on the urinalysis result does not correlate with infection.   PROCALCITONIN - Abnormal; Notable for the following components:    Procalcitonin 0.69 (*)     All other components within normal limits    Narrative:     As a Marker for Sepsis (Non-Neonates):    1. <0.5 ng/mL represents a low risk of severe sepsis and/or septic shock.  2. >2 ng/mL represents a high risk  "of severe sepsis and/or septic shock.    As a Marker for Lower Respiratory Tract Infections that require antibiotic therapy:    PCT on Admission    Antibiotic Therapy       6-12 Hrs later    >0.5                Strongly Recommended  >0.25 - <0.5        Recommended   0.1 - 0.25          Discouraged              Remeasure/reassess PCT  <0.1                Strongly Discouraged     Remeasure/reassess PCT    As 28 day mortality risk marker: \"Change in Procalcitonin Result\" (>80% or <=80%) if Day 0 (or Day 1) and Day 4 values are available. Refer to http://www.[employer] pet food companyPhysicians [facility] in [facility] – [place]pct[place]calculator.[place]    Change in PCT <=80%  A decrease of PCT levels below or equal to 80% defines a positive change in PCT test result representing a higher risk for 28-day all-cause mortality of patients diagnosed with severe sepsis for septic shock.    Change in PCT >80%  A decrease of PCT levels of more than 80% defines a negative change in PCT result representing a lower risk for 28-day all-cause mortality of patients diagnosed with severe sepsis or septic shock.      CBC WITH AUTO DIFFERENTIAL - Abnormal; Notable for the following components:    WBC 2.54 (*)     RDW 12.0 (*)     Platelets 132 (*)     Neutrophil % 81.5 (*)     Lymphocyte % 12.2 (*)     Lymphocytes, Absolute 0.31 (*)     All other components within normal limits   URINALYSIS, MICROSCOPIC ONLY - Abnormal; Notable for the following components:    RBC, UA 3-5 (*)     Squamous Epithelial Cells, UA 7-12 (*)     All other components within normal limits   COVID-19 AND FLU A/B, NP SWAB IN TRANSPORT MEDIA 1 HR TAT - Normal    Narrative:     Fact sheet for providers: https://www.fda.gov/media/807378/download    Fact sheet for patients: https://www.fda.gov/media/712973/download    Test performed by PCR.   LIPASE - Normal   LACTIC ACID, PLASMA - Normal   ACETAMINOPHEN LEVEL - Normal   COVID PRE-OP / PRE-PROCEDURE SCREENING ORDER (NO ISOLATION)    Narrative:     The following orders were created for " panel order COVID PRE-OP / PRE-PROCEDURE SCREENING ORDER (NO ISOLATION) - Swab, Nasopharynx.  Procedure                               Abnormality         Status                     ---------                               -----------         ------                     COVID-19 and FLU A/B PCR...[[id number]]  Normal              Final result                 Please view results for these tests on the individual orders.   BLOOD CULTURE   BLOOD CULTURE   URINE CULTURE   CHLAMYDIA TRACHOMATIS, NEISSERIA GONORRHOEAE, PCR   HEPATITIS PANEL, ACUTE   HIV-1/O/2 ANTIGEN/ANTIBODY   MONONUCLEOSIS SCREEN   CMV IGG IGM   EBV ANTIBODY PROFILE   POCT PEFORM URINE PREGNANCY   CBC AND DIFFERENTIAL    Narrative:     The following orders were created for panel order CBC & Differential.  Procedure                               Abnormality         Status                     ---------                               -----------         ------                     CBC Auto Differential[[id number]]        Abnormal            Final result               Scan Slide[[id number]]                                                                    Please view results for these tests on the individual orders.       Meds Given in ED:   Medications   vancomycin IVPB 1500 mg in 0.9% NaCl (Premix) 500 mL (has no administration in time range)   piperacillin-tazobactam (ZOSYN) 3.375 g IVPB in 100 mL NS MBP (CD) (has no administration in time range)   sodium chloride 0.9 % bolus 1,000 mL (1,000 mL Intravenous New Bag 9/27/24 1808)   iopamidol (ISOVUE-300) 61 % injection 97 mL (97 mL Intravenous Given 9/27/24 1817)   acetaminophen (TYLENOL) tablet 1,000 mg (1,000 mg Oral Given 9/27/24 2000)   gadobenate dimeglumine (MULTIHANCE) injection 15 mL (15 mL Intravenous Given 9/27/24 1923)           Last NIH score:                                                          Dysphagia screening results:        Watson Coma Scale:  No data recorded     CIWA:        Restraint  Type:            Isolation Status:  No active isolations          Electronically signed by Sophie Celis RN at 09/27/24 2058       Bacilio Espino MD at 09/27/24 171            [place]    EMERGENCY DEPARTMENT ENCOUNTER      Pt Name: Madalyn Alexandre  MRN: [id number]  YOB: 2000  Date of evaluation: 9/27/2024  Provider: Bacilio Espino MD    CHIEF COMPLAINT       Chief Complaint   Patient presents with    Back Pain         HISTORY OF PRESENT ILLNESS   Madalyn Alexandre is a 24 y.o. female who presents to the emergency department with complaint of several days of worsening midline low back pain. She denies having had any other symptoms. She was unaware she had a fever. Specifically, denies headache, cough, chills, abdominal pain, vomiting, diarrhea, urinary symptoms. She denies any vaginal discharge, new sexual partners. Patient is otherwise healthy with no major medical issues.       Nursing notes were reviewed.    REVIEW OF SYSTEMS     ROS:  A chief complaint appropriate review of systems was completed and is negative except as noted in the HPI.      PAST MEDICAL HISTORY     Past Medical History:   Diagnosis Date    Abnormal Pap smear of cervix 4/12/22    Chlamydia 10/06/2020    Elevated prolactin level     Irregular menses          SURGICAL HISTORY       Past Surgical History:   Procedure Laterality Date    NO PAST SURGERIES           CURRENT MEDICATIONS       Current Facility-Administered Medications:     acetaminophen (TYLENOL) tablet 650 mg, 650 mg, Oral, Q4H PRN, Janette Shukla APRN, 650 mg at 09/28/24 0343    sennosides-docusate (PERICOLACE) 8.6-50 MG per tablet 2 tablet, 2 tablet, Oral, BID PRN **AND** polyethylene glycol (MIRALAX) packet 17 g, 17 g, Oral, Daily PRN **AND** bisacodyl (DULCOLAX) EC tablet 5 mg, 5 mg, Oral, Daily PRN **AND** bisacodyl (DULCOLAX) suppository 10 mg, 10 mg, Rectal, Daily PRN, Janette Shukla APRN    ondansetron (ZOFRAN) injection 4 mg, 4 mg, Intravenous,  Q6H PRN, Janette Shukla, APRN    Pharmacy to dose vancomycin, , Does not apply, Continuous PRN, Janette Shukla, APRN    piperacillin-tazobactam (ZOSYN) 4.5 g IVPB in 100 mL NS MBP (CD), 4.5 g, Intravenous, Q8H, Janette Shukla, APRN, 4.5 g at 09/28/24 0252    sodium chloride 0.9 % flush 10 mL, 10 mL, Intravenous, Q12H, Janette Shukla, APRN, 10 mL at 09/27/24 2244    sodium chloride 0.9 % flush 10 mL, 10 mL, Intravenous, PRN, Janette Shukla, APRN    sodium chloride 0.9 % infusion 40 mL, 40 mL, Intravenous, PRN, Janette Shukla, APRN    sodium chloride 0.9 % infusion, 100 mL/hr, Intravenous, Continuous, Janette Shukla, APRN, Last Rate: 100 mL/hr at 09/27/24 2245, 100 mL/hr at 09/27/24 2245    vancomycin (VANCOCIN) 1,000 mg in sodium chloride 0.9 % 250 mL IVPB-VTB, 1,000 mg, Intravenous, Q12H, Jamee Manriquez, [facility]    ALLERGIES     Vancomycin    FAMILY HISTORY       Family History   Problem Relation Age of Onset    No Known Problems Other     Diabetes type II Maternal Grandmother           SOCIAL HISTORY       Social History     Socioeconomic History    Marital status: Single   Tobacco Use    Smoking status: Never    Smokeless tobacco: Never   Vaping Use    Vaping status: Former    Substances: Nicotine, Flavoring    Devices: Refillable tank   Substance and Sexual Activity    Alcohol use: No    Drug use: Not Currently     Types: Marijuana    Sexual activity: Yes     Partners: Male     Birth control/protection: Condom, None     Comment: occasional use         PHYSICAL EXAM    (up to 7 for level 4, 8 or more for level 5)     Vitals:    09/27/24 2154 09/27/24 2305 09/28/24 0325 09/28/24 0700   BP:  97/57 96/67    BP Location:  Right arm Right arm    Patient Position:  Lying Lying    Pulse:  86 111 74   Resp:  18 18    Temp:  99.8 °F (37.7 °C) (!) 100.8 °F (38.2 °C)    TempSrc:  Oral Oral    SpO2:  96% 97% 96%   Weight: 55.2 kg (121 lb 9.6 oz)      Height:           General: Awake, alert, no acute distress.  HEENT:  Conjunctivae normal.  Neck: Trachea midline.  Cardiac: Tachycardic, regular rhythm, no murmurs, rubs, or gallops  Lungs: Lungs are clear to auscultation, there is no wheezing, rhonchi, or rales. There is no use of accessory muscles.  Chest wall: There is no tenderness to palpation over the chest wall or over ribs  Abdomen: Abdomen is soft, nontender, nondistended. There are no firm or pulsatile masses, no rebound rigidity or guarding.   Musculoskeletal: No deformity. Point tenderness present in the lumbar spine.  Neuro: Alert and oriented x 4.  Dermatology: Skin is warm and dry  Psych: Mentation is grossly normal, cognition is grossly normal. Affect is appropriate.        DIAGNOSTIC RESULTS     EKG: All EKGs are interpreted by the Emergency Department Physician who either signs or Co-signs this chart in the absence of a cardiologist.    ECG 12 Lead Tachycardia   Preliminary Result   Test Reason : Tachycardia   Blood Pressure :   */*   mmHG   Vent. Rate :  88 BPM     Atrial Rate :  88 BPM      P-R Int : 156 ms          QRS Dur :  74 ms       QT Int : 368 ms       P-R-T Axes :  24  12   0 degrees      QTc Int : 445 ms      Normal sinus rhythm   Cannot rule out Anterior infarct , age undetermined   Abnormal ECG   No previous ECGs available      Referred By:            Confirmed By:             RADIOLOGY:   [x] Radiologist's Report Reviewed:  MRI Lumbar Spine With & Without Contrast   Final Result   Impression:      1. Mild degenerative facet change at the L4/5 and L5/S1 levels. There is no focal disc protrusion, spinal canal stenosis, or neural foraminal narrowing identified.            Electronically Signed: Sal Pacheco MD     9/27/2024 7:42 PM EDT     Workstation ID: [id number]      CT Abdomen Pelvis With Contrast   Final Result   Impression:      Small to moderate volume free fluid in the pelvis. Finding may be physiologic and possibly secondary to recently ruptured ovarian cyst however, in the setting of fever or  signs of sepsis, underlying infectious process not excluded. Clinically correlate.      Otherwise, unremarkable contrast-enhanced CT of the abdomen and pelvis.            Electronically Signed: Albert Chau MD     9/27/2024 6:32 PM EDT     Workstation ID: [id number]      US Liver    (Results Pending)       I ordered and independently reviewed the above noted radiographic studies.        LABS:    I have reviewed and interpreted all of the currently available lab results from this visit (if applicable):  Results for orders placed or performed during the hospital encounter of 09/27/24   COVID-19 and FLU A/B PCR, 1 HR TAT - Swab, Nasopharynx    Specimen: Nasopharynx; Swab   Result Value Ref Range    COVID19 Not Detected Not Detected - Ref. Range    Influenza A PCR Not Detected Not Detected    Influenza B PCR Not Detected Not Detected   Respiratory Panel PCR w/COVID-19(SARS-CoV-2) ALBERTO/ALIN/HAKAN/PAD/COR/RASHI In-House, NP Swab in UTM/VTM, 2 HR TAT - Swab, Nasopharynx    Specimen: Nasopharynx; Swab   Result Value Ref Range    ADENOVIRUS, PCR Not Detected Not Detected    Coronavirus 229E Not Detected Not Detected    Coronavirus HKU1 Not Detected Not Detected    Coronavirus NL63 Not Detected Not Detected    Coronavirus OC43 Not Detected Not Detected    COVID19 Not Detected Not Detected - Ref. Range    Human Metapneumovirus Not Detected Not Detected    Human Rhinovirus/Enterovirus Not Detected Not Detected    Influenza A PCR Not Detected Not Detected    Influenza B PCR Not Detected Not Detected    Parainfluenza Virus 1 Not Detected Not Detected    Parainfluenza Virus 2 Not Detected Not Detected    Parainfluenza Virus 3 Not Detected Not Detected    Parainfluenza Virus 4 Not Detected Not Detected    RSV, PCR Not Detected Not Detected    Bordetella pertussis pcr Not Detected Not Detected    Bordetella parapertussis PCR Not Detected Not Detected    Chlamydophila pneumoniae PCR Not Detected Not Detected    Mycoplasma pneumo by PCR Not  Detected Not Detected   Comprehensive Metabolic Panel    Specimen: Blood   Result Value Ref Range    Glucose 117 (H) 65 - 99 mg/dL    BUN 6 6 - 20 mg/dL    Creatinine 0.77 0.57 - 1.00 mg/dL    Sodium 136 136 - 145 mmol/L    Potassium 3.6 3.5 - 5.2 mmol/L    Chloride 102 98 - 107 mmol/L    CO2 24.0 22.0 - 29.0 mmol/L    Calcium 8.7 8.6 - 10.5 mg/dL    Total Protein 6.9 6.0 - 8.5 g/dL    Albumin 4.0 3.5 - 5.2 g/dL    ALT (SGPT) 614 (H) 1 - 33 U/L    AST (SGOT) 614 (H) 1 - 32 U/L    Alkaline Phosphatase 203 (H) 39 - 117 U/L    Total Bilirubin 0.6 0.0 - 1.2 mg/dL    Globulin 2.9 gm/dL    A/G Ratio 1.4 g/dL    BUN/Creatinine Ratio 7.8 7.0 - 25.0    Anion Gap 10.0 5.0 - 15.0 mmol/L    eGFR 109.3 >60.0 mL/min/1.73   Lipase    Specimen: Blood   Result Value Ref Range    Lipase 25 13 - 60 U/L   Urinalysis With Culture If Indicated - Urine, Clean Catch    Specimen: Urine, Clean Catch   Result Value Ref Range    Color, UA Dark Yellow (A) Yellow, Straw    Appearance, UA Cloudy (A) Clear    pH, UA 8.0 5.0 - 8.0    Specific Gravity, UA 1.019 1.001 - 1.030    Glucose, UA Negative Negative    Ketones, UA 15 mg/dL (1+) (A) Negative    Bilirubin, UA Negative Negative    Blood, UA Negative Negative    Protein,  mg/dL (2+) (A) Negative    Leuk Esterase, UA Trace (A) Negative    Nitrite, UA Negative Negative    Urobilinogen, UA 1.0 E.U./dL 0.2 - 1.0 E.U./dL   Lactic Acid, Plasma    Specimen: Blood   Result Value Ref Range    Lactate 0.8 0.5 - 2.0 mmol/L   Procalcitonin    Specimen: Blood   Result Value Ref Range    Procalcitonin 0.69 (H) 0.00 - 0.25 ng/mL   CBC Auto Differential    Specimen: Blood   Result Value Ref Range    WBC 2.54 (L) 3.40 - 10.80 10*3/mm3    RBC 4.40 3.77 - 5.28 10*6/mm3    Hemoglobin 14.0 12.0 - 15.9 g/dL    Hematocrit 40.9 34.0 - 46.6 %    MCV 93.0 79.0 - 97.0 fL    MCH 31.8 26.6 - 33.0 pg    MCHC 34.2 31.5 - 35.7 g/dL    RDW 12.0 (L) 12.3 - 15.4 %    RDW-SD 41.6 37.0 - 54.0 fl    MPV 12.0 6.0 - 12.0 fL     Platelets 132 (L) 140 - 450 10*3/mm3    Neutrophil % 81.5 (H) 42.7 - 76.0 %    Lymphocyte % 12.2 (L) 19.6 - 45.3 %    Monocyte % 5.1 5.0 - 12.0 %    Eosinophil % 0.4 0.3 - 6.2 %    Basophil % 0.4 0.0 - 1.5 %    Immature Grans % 0.4 0.0 - 0.5 %    Neutrophils, Absolute 2.07 1.70 - 7.00 10*3/mm3    Lymphocytes, Absolute 0.31 (L) 0.70 - 3.10 10*3/mm3    Monocytes, Absolute 0.13 0.10 - 0.90 10*3/mm3    Eosinophils, Absolute 0.01 0.00 - 0.40 10*3/mm3    Basophils, Absolute 0.01 0.00 - 0.20 10*3/mm3    Immature Grans, Absolute 0.01 0.00 - 0.05 10*3/mm3    nRBC 0.0 0.0 - 0.2 /100 WBC   Urinalysis, Microscopic Only - Urine, Clean Catch    Specimen: Urine, Clean Catch   Result Value Ref Range    RBC, UA 3-5 (A) None Seen, 0-2 /HPF    WBC, UA 0-2 None Seen, 0-2 /HPF    Bacteria, UA None Seen None Seen, Trace /HPF    Squamous Epithelial Cells, UA 7-12 (A) None Seen, 0-2 /HPF    Hyaline Casts, UA 0-6 0 - 6 /LPF    Methodology Automated Microscopy    Hepatitis Panel, Acute    Specimen: Blood   Result Value Ref Range    Hepatitis B Surface Ag Non-Reactive Non-Reactive    Hep A IgM Non-Reactive Non-Reactive    Hep B C IgM Non-Reactive Non-Reactive    Hepatitis C Ab Non-Reactive Non-Reactive   Acetaminophen Level    Specimen: Blood   Result Value Ref Range    Acetaminophen 8.5 0.0 - 30.0 mcg/mL   HIV-1 / O / 2 Ag / Antibody    Specimen: Blood   Result Value Ref Range    HIV-1/ HIV-2 Non-Reactive Non-Reactive   Mononucleosis Screen    Specimen: Blood   Result Value Ref Range    Monospot Negative Negative   Protime-INR    Specimen: Blood   Result Value Ref Range    Protime 16.6 (H) 12.2 - 14.5 Seconds    INR 1.33 (H) 0.89 - 1.12   Ethanol    Specimen: Blood   Result Value Ref Range    Ethanol <10 0 - 10 mg/dL   Ammonia    Specimen: Blood   Result Value Ref Range    Ammonia 10 (L) 11 - 51 umol/L   POC Urine Pregnancy    Specimen: Urine   Result Value Ref Range    HCG, Urine, QL Negative Negative    Lot Number [id number]     Internal  Positive Control Positive Positive, Passed    Internal Negative Control Negative Negative, Passed    Expiration Date 01/28/2025    ECG 12 Lead Tachycardia   Result Value Ref Range    QT Interval 368 ms    QTC Interval 445 ms        If labs were ordered, I independently reviewed the results and considered them in treating the patient.      EMERGENCY DEPARTMENT COURSE and DIFFERENTIAL DIAGNOSIS/MDM:   Vitals:  AS OF 07:44 EDT    BP - 96/67  HR - 74  TEMP - (!) 100.8 °F (38.2 °C) (Oral)  O2 SATS - 96%        Discussion below represents my analysis of pertinent findings related to patient's condition, differential diagnosis, treatment plan and final disposition.      Differential diagnosis:  The differential diagnosis associated with the patient's presentation includes: cystitis, pyelo, cervicitis, PID, TOA, discitis, epidural abscess, covid, flu      Independent interpretations (ECG/rhythm strip/X-ray/US/CT scan): I independently interpreted the pt abd Ct and cardiac monitor - no evidence of hepatic pathology and the patient is in sinus tach.      Additional sources:  Discussed/obtained information from independent historians:   [] Spouse:   [] Parent:   [] Friend:   [] EMS:   [] Other:  External (non-ED) record review:   [] Inpatient record:   [] Office record:   [] Outpatient record:   [] Prior Outpatient labs:   [] Prior Outpatient radiology:   [] Primary Care record:   [] Outside ED record:   [x] Other: Reviewed previous urgent care record - patient dx and tx for MSK low back pain      Patient's care impacted by:   [] Diabetes   [] Hypertension   [] Coronary Artery Disease   [] Cancer   [x] Other: Hx of chlamydia    Care significantly affected by Social Determinants of Health (housing and economic circumstances, unemployment)    [] Yes     [x] No   If yes, Patient's care significantly limited by  Social Determinants of Health including:    [] Inadequate housing    [] Low income    [] Alcoholism and drug addiction  in family    [] Problems related to primary support group    [] Unemployment    [] Problems related to employment    [] Other Social Determinants of Health:       Consideration of admission/observation vs discharge: Patient presents with findings concerning for sepsis of unknown origin - needs admission for further evaluation      I considered prescription management with:    [] Pain medication:   [] Antiviral:   [x] Antibiotic: Patient empirically started on Vanc, Zosyn   [] Other:    ED Course:    ED Course as of 09/28/24 0744   Fri Sep 27, 2024   2013 Pelvic exam performed with patient nurse Stephanie Addison at bedside.  Scant amount of white discharge.  No cervical motion tenderness.  Cervix is normal in appearance without any evidence of purulent discharge, friability, erythema, or significant tenderness. [NS]   2033 Patient presents with infection of unknown origin. Only complaint is low back pain and vomiting for the past week. Had some point tenderness in the lumbar spine but no abdominal tenderness. She has had no respiratory symptoms. Febrile to 102.5 on arrival, tachycardic to 120, leukopenic with white count of 2.5, slightly elevated procalcitonin, significantly elevated transaminases but with normal bilirubin. Urine looks fine. Obtained an abdominal CT that showed small to moderate amount of pelvic free fluid but nothing else acute. Given point tenderness in the lumbar spine, obtained MRI which was normal. She has a history of chlamydia and so performed a pelvic exam which was normal. No abnormal discharge, cervix looked fine, no cervical motion tenderness. Have added on testing for HIV, EBV, CMV. She has gotten fluids and ordered a dose of broad-spectrum antibiotics. [NS]   2033 I discussed case with hospitalist Dr. Munguia.  Discussed history, presentation, workup.  Accept patient for admission. [NS]      ED Course User Index  [NS] Bacilio Espino MD         PROCEDURES:  Procedures    CRITICAL CARE TIME     Approximately 35 minutes of discontinuous critical care time was provided to this patient by myself absent of any time spent performing procedures.  Patient presents critically ill with acute sepsis of unknown origin placing the CV, resp, neuro, renal systems at risk requiring the following interventions: IV fluid resus, broad spectrum IV abx, interpretation of labs/ecg/imaging, frequent reassessment, coordination of admission.  Patient at high risk of deterioration and possibly death without these interventions.      FINAL IMPRESSION      1. Acute sepsis    2. Acute midline low back pain without sciatica    3. Nausea and vomiting, unspecified vomiting type    4. Leukopenia, unspecified type    5. Abnormal LFTs          DISPOSITION/PLAN     ED Disposition       ED Disposition   Decision to Admit    Condition   --    Comment   Level of Care: Telemetry [5]   Diagnosis: Sepsis [2692806]   Admitting Physician: TOM JIMÉNEZ [[id number]]   Attending Physician: TOM JIMÉNEZ [[id number]]   Certification: I Certify That Inpatient Hospital Services Are Medically Necessary For Greater Than 2 Midnights                   Comment: Please note this report has been produced using speech recognition software.      Bacilio Espino MD  Attending Emergency Physician             Bacilio Espino MD  09/28/24 0750      Electronically signed by Bacilio Espino MD at 09/28/24 0750       Current Facility-Administered Medications   Medication Dose Route Frequency Provider Last Rate Last Admin    acetylcysteine (ACETADOTE) 6,000 mg in dextrose (D5W) 5 % 1,000 mL infusion  6.25 mg/kg/hr Intravenous Continuous Brunner, Mark I, MD 57.5 mL/hr at 09/29/24 1616 6.25 mg/kg/hr at 09/29/24 1616    sennosides-docusate (PERICOLACE) 8.6-50 MG per tablet 2 tablet  2 tablet Oral BID PRN Janette Shukla APRN        And    polyethylene glycol (MIRALAX) packet 17 g  17 g Oral Daily PRN Janette Shukla APRN        And    bisacodyl (DULCOLAX) EC  tablet 5 mg  5 mg Oral Daily PRN Janette Shukla APRN        And    bisacodyl (DULCOLAX) suppository 10 mg  10 mg Rectal Daily PRN Janette Shukla APRN        doxycycline (VIBRAMYCIN) 100 mg in sodium chloride 0.9 % 100 mL MBP  100 mg Intravenous Q12H Hay Waters PA   100 mg at 09/30/24 0614    ondansetron (ZOFRAN) injection 4 mg  4 mg Intravenous Q6H PRN Janette Shukla APRN   4 mg at 09/29/24 1820    prochlorperazine (COMPAZINE) injection 5 mg  5 mg Intravenous Q6H PRN Ellie Baumann MD   5 mg at 09/29/24 1144    sodium chloride 0.9 % flush 10 mL  10 mL Intravenous Q12H Janette Shukla APRN   10 mL at 09/30/24 0835    sodium chloride 0.9 % flush 10 mL  10 mL Intravenous PRN Janette Shukla APRN        sodium chloride 0.9 % infusion 40 mL  40 mL Intravenous PRN Janette Shukla APRN        sodium chloride 0.9 % infusion  100 mL/hr Intravenous Continuous Janette Shukla APRN 100 mL/hr at 09/30/24 0837 100 mL/hr at 09/30/24 0837     Lab Results (all)       Procedure Component Value Units Date/Time    Blood Culture - Blood, Hand, Right [[id number]] Collected: 09/30/24 1115    Specimen: Blood from Hand, Right Updated: 09/30/24 1128    EBV Antibody Profile [[id number]] Collected: 09/30/24 1115    Specimen: Blood Updated: 09/30/24 1121    Hepatitis B DNA, Quantitative, PCR [[id number]] Collected: 09/30/24 1115    Specimen: Blood Updated: 09/30/24 1121    Blood Culture - Blood, Hand, Right [[id number]] Collected: 09/30/24 0030    Specimen: Blood from Hand, Right Updated: 09/30/24 0444    CBC & Differential [[id number]]  (Abnormal) Collected: 09/30/24 0041    Specimen: Blood Updated: 09/30/24 0257    Narrative:      The following orders were created for panel order CBC & Differential.  Procedure                               Abnormality         Status                     ---------                               -----------         ------                     CBC Auto Differential[[id number]]        Abnormal             Final result               Scan Slide[[id number]]                                       Final result                 Please view results for these tests on the individual orders.    Manual Differential [[id number]]  (Abnormal) Collected: 09/30/24 0041    Specimen: Blood Updated: 09/30/24 0257     Neutrophil % 49.0 %      Lymphocyte % 21.0 %      Monocyte % 14.0 %      Eosinophil % 0.0 %      Basophil % 0.0 %      Bands %  16.0 %      Neutrophils Absolute 2.67 10*3/mm3      Lymphocytes Absolute 0.86 10*3/mm3      Monocytes Absolute 0.57 10*3/mm3      Eosinophils Absolute 0.00 10*3/mm3      Basophils Absolute 0.00 10*3/mm3      RBC Morphology Normal     WBC Morphology Normal     Platelet Morphology Normal    CBC Auto Differential [[id number]]  (Abnormal) Collected: 09/30/24 0041    Specimen: Blood Updated: 09/30/24 0257     WBC 4.10 10*3/mm3      RBC 4.08 10*6/mm3      Hemoglobin 13.0 g/dL      Hematocrit 37.4 %      MCV 91.7 fL      MCH 31.9 pg      MCHC 34.8 g/dL      RDW 12.0 %      RDW-SD 40.8 fl      MPV 11.9 fL      Platelets 118 10*3/mm3     Narrative:      The previously reported component NRBC is no longer being reported. Previous result was 0.0 /100 WBC (Reference Range: 0.0-0.2 /100 WBC) on 9/30/2024 at [address] [place], [place] [address]T.    Scan Slide [[id number]] Collected: 09/30/24 0041    Specimen: Blood Updated: 09/30/24 0257     Scan Slide --     Comment: See Manual Differential Results       Comprehensive Metabolic Panel [[id number]]  (Abnormal) Collected: 09/30/24 0041    Specimen: Blood Updated: 09/30/24 0146     Glucose 108 mg/dL      BUN 4 mg/dL      Creatinine 0.60 mg/dL      Sodium 133 mmol/L      Potassium 3.5 mmol/L      Chloride 96 mmol/L      CO2 21.0 mmol/L      Calcium 8.2 mg/dL      Total Protein 6.1 g/dL      Albumin 3.5 g/dL      ALT (SGPT) 5,626 U/L      AST (SGOT) 6,391 U/L      Alkaline Phosphatase 228 U/L      Total Bilirubin 2.8 mg/dL      Globulin 2.6 gm/dL      Comment: Calculated Result        A/G  Ratio 1.3 g/dL      BUN/Creatinine Ratio 6.7     Anion Gap 16.0 mmol/L      eGFR 127.1 mL/min/1.73     Narrative:      GFR Normal >60  Chronic Kidney Disease <60  Kidney Failure <15      Protime-INR [[id number]]  (Abnormal) Collected: 09/30/24 0041    Specimen: Blood Updated: 09/30/24 0134     Protime 18.1 Seconds      INR 1.49    Blood Culture - Blood, Wrist, Right [[id number]]  (Normal) Collected: 09/27/24 2039    Specimen: Blood from Wrist, Right Updated: 09/29/24 2131     Blood Culture No growth at 2 days    Blood Culture - Blood, Arm, Right [[id number]]  (Normal) Collected: 09/27/24 1801    Specimen: Blood from Arm, Right Updated: 09/29/24 1916     Blood Culture No growth at 2 days    CMV DNA, Quantitative, PCR [[id number]] Collected: 09/29/24 1324    Specimen: Blood Updated: 09/29/24 1328    Ileana-Barr Virus, Quant [[id number]] Collected: 09/29/24 1324    Specimen: Blood Updated: 09/29/24 1328    Urine Culture - Urine, Urine, Clean Catch [[id number]] Collected: 09/27/24 1728    Specimen: Urine, Clean Catch Updated: 09/29/24 0941     Urine Culture >100,000 CFU/mL Mixed Beena Isolated    Narrative:      Specimen contains mixed organisms of questionable pathogenicity suggestive of contamination. If symptoms persist, suggest recollection.  Colonization of the urinary tract without infection is common. Treatment is discouraged unless the patient is symptomatic, pregnant, or undergoing an invasive urologic procedure.    Lyme Disease Total Antibody With Reflex to Immunoassay [[id number]] Collected: 09/28/24 0718    Specimen: Blood Updated: 09/29/24 0908     Lyme Total Antibody EIA Negative     Comment: Lyme antibodies not detected. Reflex testing is not indicated.  No laboratory evidence of infection with B. burgdorferi (Lyme disease).  Negative results may occur in patients recently infected (less than  or equal to 14 days) with B. burgdorferi.  If recent infection is  suspected, repeat testing on a new sample collected  in 7 to 14 days is  recommended.       Narrative:      Performed at:  [address]  [id number]  : Bart Reis PhD, Phone:  [phone]    CMV IgG IgM [[id number]] Collected: 09/27/24 2039    Specimen: Blood Updated: 09/29/24 0810     CMV IgG <0.60 U/mL      Comment:                                Negative          <0.60                                 Equivocal   0.60 - 0.69                                 Positive          >0.69        CMV IgM <30.0 AU/mL      Comment:                                 Negative         <30.0                                  Equivocal  30.0 - 34.9                                  Positive         >34.9  A positive result is generally indicative of acute  infection, reactivation or persistent IgM production.       Narrative:      Performed at:  [address]  [id number]  : Bart Reis PhD, Phone:  [phone]    Comprehensive Metabolic Panel [[id number]]  (Abnormal) Collected: 09/29/24 0640    Specimen: Blood Updated: 09/29/24 0755     Glucose 92 mg/dL      BUN 4 mg/dL      Creatinine 0.75 mg/dL      Sodium 135 mmol/L      Potassium 3.3 mmol/L      Chloride 100 mmol/L      CO2 22.0 mmol/L      Calcium 8.0 mg/dL      Total Protein 6.5 g/dL      Albumin 3.5 g/dL      ALT (SGPT) 2,972 U/L      AST (SGOT) 3,202 U/L      Alkaline Phosphatase 251 U/L      Total Bilirubin 2.1 mg/dL      Globulin 3.0 gm/dL      Comment: Calculated Result        A/G Ratio 1.2 g/dL      BUN/Creatinine Ratio 5.3     Anion Gap 13.0 mmol/L      eGFR 112.8 mL/min/1.73     Narrative:      GFR Normal >60  Chronic Kidney Disease <60  Kidney Failure <15      Manual Differential [[id number]]  (Abnormal) Collected: 09/29/24 0640    Specimen: Blood Updated: 09/29/24 0750     Neutrophil % 37.0 %      Lymphocyte % 13.0 %      Monocyte % 2.0 %      Eosinophil % 0.0 %      Basophil % 0.0 %      Bands %  44.0 %      Atypical  Lymphocyte % 4.0 %      Neutrophils Absolute 2.77 10*3/mm3      Lymphocytes Absolute 0.58 10*3/mm3      Monocytes Absolute 0.07 10*3/mm3      Eosinophils Absolute 0.00 10*3/mm3      Basophils Absolute 0.00 10*3/mm3      RBC Morphology Normal     WBC Morphology Normal     Platelet Estimate Decreased    CBC & Differential [[id number]]  (Abnormal) Collected: 09/29/24 0640    Specimen: Blood Updated: 09/29/24 0750    Narrative:      The following orders were created for panel order CBC & Differential.  Procedure                               Abnormality         Status                     ---------                               -----------         ------                     CBC Auto Differential[[id number]]        Abnormal            Final result               Scan Slide[[id number]]                                                                    Please view results for these tests on the individual orders.    CBC Auto Differential [[id number]]  (Abnormal) Collected: 09/29/24 0640    Specimen: Blood Updated: 09/29/24 0750     WBC 3.42 10*3/mm3      RBC 4.28 10*6/mm3      Hemoglobin 13.6 g/dL      Hematocrit 39.4 %      MCV 92.1 fL      MCH 31.8 pg      MCHC 34.5 g/dL      RDW 12.1 %      RDW-SD 41.0 fl      MPV 11.6 fL      Platelets 133 10*3/mm3     Narrative:      The previously reported component NRBC is no longer being reported. Previous result was 0.0 /100 WBC (Reference Range: 0.0-0.2 /100 WBC) on 9/29/2024 at 0728 EDT.    Protime-INR [[id number]]  (Abnormal) Collected: 09/29/24 0640    Specimen: Blood Updated: 09/29/24 0733     Protime 16.0 Seconds      INR 1.27    Ceruloplasmin [[id number]]  (Normal) Collected: 09/28/24 0718    Specimen: Blood Updated: 09/28/24 2112     Ceruloplasmin 24 mg/dL     Fentanyl, Urine - Urine, Clean Catch [[id number]]  (Normal) Collected: 09/28/24 1825    Specimen: Urine, Clean Catch Updated: 09/28/24 1846     Fentanyl, Urine Negative    Narrative:      Negative Threshold:      Fentanyl 5  ng/mL     The normal value for the drug tested is negative. This report includes final unconfirmed screening results to be used for medical treatment purposes only. Unconfirmed results must not be used for non-medical purposes such as employment or legal testing. Clinical consideration should be applied to any drug of abuse test, particularly when unconfirmed results are used.           JUSTICE Comprehensive Panel [[id number]] Collected: 09/28/24 1843    Specimen: Blood Updated: 09/28/24 1843    Mitochondrial Antibodies, M2 [[id number]] Collected: 09/28/24 1837    Specimen: Blood Updated: 09/28/24 1843    Anti-Smooth Muscle Antibody Titer [[id number]] Collected: 09/28/24 1837    Specimen: Blood Updated: 09/28/24 1843    Protein Elec + Interp, Serum [[id number]] Collected: 09/28/24 1837    Specimen: Blood Updated: 09/28/24 1843    Urine Drug Screen - Urine, Clean Catch [[id number]]  (Normal) Collected: 09/28/24 1825    Specimen: Urine, Clean Catch Updated: 09/28/24 1837     THC, Screen, Urine Negative     Phencyclidine (PCP), Urine Negative     Cocaine Screen, Urine Negative     Methamphetamine, Ur Negative     Opiate Screen Negative     Amphetamine Screen, Urine Negative     Benzodiazepine Screen, Urine Negative     Tricyclic Antidepressants Screen Negative     Methadone Screen, Urine Negative     Barbiturates Screen, Urine Negative     Oxycodone Screen, Urine Negative     Buprenorphine, Screen, Urine Negative    Narrative:      Cutoff For Drugs Screened:    Amphetamines               500 ng/ml  Barbiturates               200 ng/ml  Benzodiazepines            150 ng/ml  Cocaine                    150 ng/ml  Methadone                  200 ng/ml  Opiates                    100 ng/ml  Phencyclidine               25 ng/ml  THC                         50 ng/ml  Methamphetamine            500 ng/ml  Tricyclic Antidepressants  300 ng/ml  Oxycodone                  100 ng/ml  Buprenorphine               10 ng/ml    The normal value  for all drugs tested is negative. This report includes unconfirmed screening results, with the cutoff values listed, to be used for medical treatment purposes only.  Unconfirmed results must not be used for non-medical purposes such as employment or legal testing.  Clinical consideration should be applied to any drug of abuse test, particularly when unconfirmed results are used.      Pregnancy, Urine - Urine, Clean Catch [[id number]]  (Normal) Collected: 09/28/24 1825    Specimen: Urine, Clean Catch Updated: 09/28/24 1830     HCG, Urine QL Negative    CK [[id number]]  (Normal) Collected: 09/28/24 0718    Specimen: Blood Updated: 09/28/24 1512     Creatine Kinase 57 U/L     MRSA Screen, PCR (Inpatient) - Swab, Nares [[id number]]  (Normal) Collected: 09/27/24 2250    Specimen: Swab from Nares Updated: 09/28/24 0906     MRSA PCR Negative    Narrative:      The negative predictive value of this diagnostic test is high and should only be used to consider de-escalating anti-MRSA therapy. A positive result may indicate colonization with MRSA and must be correlated clinically.  MRSA Negative    CBC Auto Differential [[id number]]  (Abnormal) Collected: 09/28/24 0718    Specimen: Blood Updated: 09/28/24 0830     WBC 2.89 10*3/mm3      RBC 4.26 10*6/mm3      Hemoglobin 13.5 g/dL      Hematocrit 39.7 %      MCV 93.2 fL      MCH 31.7 pg      MCHC 34.0 g/dL      RDW 12.1 %      RDW-SD 42.0 fl      MPV 11.8 fL      Platelets 123 10*3/mm3     Narrative:      The previously reported component NRBC is no longer being reported. Previous result was 0.0 /100 WBC (Reference Range: 0.0-0.2 /100 WBC) on 9/28/2024 at 0755 EDT.    Manual Differential [[id number]]  (Abnormal) Collected: 09/28/24 0718    Specimen: Blood Updated: 09/28/24 0830     Neutrophil % 51.0 %      Lymphocyte % 14.0 %      Monocyte % 3.0 %      Eosinophil % 0.0 %      Basophil % 0.0 %      Bands %  32.0 %      Neutrophils Absolute 2.40 10*3/mm3      Lymphocytes Absolute 0.40  "10*3/mm3      Monocytes Absolute 0.09 10*3/mm3      Eosinophils Absolute 0.00 10*3/mm3      Basophils Absolute 0.00 10*3/mm3      nRBC 0.0 /100 WBC      RBC Morphology Normal     WBC Morphology Normal     Platelet Morphology Normal    Comprehensive Metabolic Panel [[id number]]  (Abnormal) Collected: 09/28/24 0718    Specimen: Blood Updated: 09/28/24 0822     Glucose 77 mg/dL      BUN 5 mg/dL      Creatinine 0.72 mg/dL      Sodium 137 mmol/L      Potassium 3.7 mmol/L      Chloride 103 mmol/L      CO2 23.0 mmol/L      Calcium 8.3 mg/dL      Total Protein 6.1 g/dL      Albumin 3.6 g/dL      ALT (SGPT) 917 U/L      AST (SGOT) 869 U/L      Alkaline Phosphatase 178 U/L      Total Bilirubin 1.0 mg/dL      Globulin 2.5 gm/dL      Comment: Calculated Result        A/G Ratio 1.4 g/dL      BUN/Creatinine Ratio 6.9     Anion Gap 11.0 mmol/L      eGFR 118.4 mL/min/1.73     Narrative:      GFR Normal >60  Chronic Kidney Disease <60  Kidney Failure <15      Magnesium [[id number]]  (Normal) Collected: 09/28/24 0718    Specimen: Blood Updated: 09/28/24 0819     Magnesium 2.4 mg/dL     Procalcitonin [[id number]]  (Abnormal) Collected: 09/28/24 0718    Specimen: Blood Updated: 09/28/24 0809     Procalcitonin 0.96 ng/mL     Narrative:      As a Marker for Sepsis (Non-Neonates):    1. <0.5 ng/mL represents a low risk of severe sepsis and/or septic shock.  2. >2 ng/mL represents a high risk of severe sepsis and/or septic shock.    As a Marker for Lower Respiratory Tract Infections that require antibiotic therapy:    PCT on Admission    Antibiotic Therapy       6-12 Hrs later    >0.5                Strongly Recommended  >0.25 - <0.5        Recommended   0.1 - 0.25          Discouraged              Remeasure/reassess PCT  <0.1                Strongly Discouraged     Remeasure/reassess PCT    As 28 day mortality risk marker: \"Change in Procalcitonin Result\" (>80% or <=80%) if Day 0 (or Day 1) and Day 4 values are available. Refer to " http://[email or web address]-pct-[email or web address]    Change in PCT <=80%  A decrease of PCT levels below or equal to 80% defines a positive change in PCT test result representing a higher risk for 28-day all-cause mortality of patients diagnosed with severe sepsis for septic shock.    Change in PCT >80%  A decrease of PCT levels of more than 80% defines a negative change in PCT result representing a lower risk for 28-day all-cause mortality of patients diagnosed with severe sepsis or septic shock.       Chlamydia trachomatis, Neisseria gonorrhoeae, Trichomonas vaginalis, PCR - Urine, Urine, Clean Catch [[id number]] Collected: 09/28/24 0705    Specimen: Urine, Clean Catch Updated: 09/28/24 0757    Ammonia [[id number]]  (Abnormal) Collected: 09/28/24 0718    Specimen: Blood Updated: 09/28/24 0743     Ammonia 10 umol/L     Ehrlichia Profile DNA PCR [[id number]] Collected: 09/28/24 0718    Specimen: Blood Updated: 09/28/24 0738    Rickettsia Species DNA, Real-Time PCR [[id number]] Collected: 09/28/24 0718    Specimen: Blood Updated: 09/28/24 0738    EBV Antibody Profile [[id number]] Collected: 09/27/24 2039    Specimen: Blood Updated: 09/28/24 0700    Ethanol [[id number]]  (Normal) Collected: 09/27/24 1801    Specimen: Blood Updated: 09/28/24 0414     Ethanol <10 mg/dL     Narrative:      Elevated lactic acid concentration and lactate dehydrogenase(LD) activity may falsely elevate enzymatically determined ethanol levels. Not for legal purposes.     Protime-INR [[id number]]  (Abnormal) Collected: 09/27/24 2225    Specimen: Blood Updated: 09/27/24 2252     Protime 16.6 Seconds      INR 1.33    Respiratory Panel PCR w/COVID-19(SARS-CoV-2) ALBERTO/ALIN/HAKAN/PAD/COR/RASHI In-House, NP Swab in UTM/VTM, 2 HR TAT - Swab, Nasopharynx [[id number]]  (Normal) Collected: 09/27/24 1728    Specimen: Swab from Nasopharynx Updated: 09/27/24 2200     ADENOVIRUS, PCR Not Detected     Coronavirus 229E Not Detected     Coronavirus HKU1 Not Detected     Coronavirus NL63  Not Detected     Coronavirus OC43 Not Detected     COVID19 Not Detected     Human Metapneumovirus Not Detected     Human Rhinovirus/Enterovirus Not Detected     Influenza A PCR Not Detected     Influenza B PCR Not Detected     Parainfluenza Virus 1 Not Detected     Parainfluenza Virus 2 Not Detected     Parainfluenza Virus 3 Not Detected     Parainfluenza Virus 4 Not Detected     RSV, PCR Not Detected     Bordetella pertussis pcr Not Detected     Bordetella parapertussis PCR Not Detected     Chlamydophila pneumoniae PCR Not Detected     Mycoplasma pneumo by PCR Not Detected    Narrative:      In the setting of a positive respiratory panel with a viral infection PLUS a negative procalcitonin without other underlying concern for bacterial infection, consider observing off antibiotics or discontinuation of antibiotics and continue supportive care. If the respiratory panel is positive for atypical bacterial infection (Bordetella pertussis, Chlamydophila pneumoniae, or Mycoplasma pneumoniae), consider antibiotic de-escalation to target atypical bacterial infection.    HIV-1 / O / 2 Ag / Antibody [[id number]]  (Normal) Collected: 09/27/24 2039    Specimen: Blood Updated: 09/27/24 2127     HIV-1/ HIV-2 Non-Reactive    Narrative:      The HIV antibody/antigen combo assay is a qualitative assay for HIV that includes the p24 antigen as well as antibodies to HIV types 1 and 2. This test is intended to be used as a screening assay in the diagnosis of HIV infection in patients over the age of 2.  Results may be falsely decreased if patient taking Biotin.      Hepatitis Panel, Acute [[id number]]  (Normal) Collected: 09/27/24 2039    Specimen: Blood Updated: 09/27/24 2120     Hepatitis B Surface Ag Non-Reactive     Hep A IgM Non-Reactive     Hep B C IgM Non-Reactive     Hepatitis C Ab Non-Reactive    Narrative:      Results may be falsely decreased if patient taking Biotin.     Mononucleosis Screen [[id number]]  (Normal) Collected:  09/27/24 2039    Specimen: Blood Updated: 09/27/24 2110     Monospot Negative    Narrative:      Indicates the absence of Heterophile Antibodies associated with Infectious Mononucleosis.    Acetaminophen Level [[id number]]  (Normal) Collected: 09/27/24 1801    Specimen: Blood Updated: 09/27/24 1956     Acetaminophen 8.5 mcg/mL     CBC & Differential [[id number]]  (Abnormal) Collected: 09/27/24 1801    Specimen: Blood Updated: 09/27/24 1919    Narrative:      The following orders were created for panel order CBC & Differential.  Procedure                               Abnormality         Status                     ---------                               -----------         ------                     CBC Auto Differential[[id number]]        Abnormal            Final result               Scan Slide[[id number]]                                                                    Please view results for these tests on the individual orders.    CBC Auto Differential [[id number]]  (Abnormal) Collected: 09/27/24 1801    Specimen: Blood Updated: 09/27/24 1919     WBC 2.54 10*3/mm3      RBC 4.40 10*6/mm3      Hemoglobin 14.0 g/dL      Hematocrit 40.9 %      MCV 93.0 fL      MCH 31.8 pg      MCHC 34.2 g/dL      RDW 12.0 %      RDW-SD 41.6 fl      MPV 12.0 fL      Platelets 132 10*3/mm3      Neutrophil % 81.5 %      Lymphocyte % 12.2 %      Monocyte % 5.1 %      Eosinophil % 0.4 %      Basophil % 0.4 %      Immature Grans % 0.4 %      Neutrophils, Absolute 2.07 10*3/mm3      Lymphocytes, Absolute 0.31 10*3/mm3      Monocytes, Absolute 0.13 10*3/mm3      Eosinophils, Absolute 0.01 10*3/mm3      Basophils, Absolute 0.01 10*3/mm3      Immature Grans, Absolute 0.01 10*3/mm3      nRBC 0.0 /100 WBC     Procalcitonin [[id number]]  (Abnormal) Collected: 09/27/24 1801    Specimen: Blood Updated: 09/27/24 1906     Procalcitonin 0.69 ng/mL     Narrative:      As a Marker for Sepsis (Non-Neonates):    1. <0.5 ng/mL represents a low risk of severe  "sepsis and/or septic shock.  2. >2 ng/mL represents a high risk of severe sepsis and/or septic shock.    As a Marker for Lower Respiratory Tract Infections that require antibiotic therapy:    PCT on Admission    Antibiotic Therapy       6-12 Hrs later    >0.5                Strongly Recommended  >0.25 - <0.5        Recommended   0.1 - 0.25          Discouraged              Remeasure/reassess PCT  <0.1                Strongly Discouraged     Remeasure/reassess PCT    As 28 day mortality risk marker: \"Change in Procalcitonin Result\" (>80% or <=80%) if Day 0 (or Day 1) and Day 4 values are available. Refer to http://[email or web address] [email or web address]-pct-[email or web address]    Change in PCT <=80%  A decrease of PCT levels below or equal to 80% defines a positive change in PCT test result representing a higher risk for 28-day all-cause mortality of patients diagnosed with severe sepsis for septic shock.    Change in PCT >80%  A decrease of PCT levels of more than 80% defines a negative change in PCT result representing a lower risk for 28-day all-cause mortality of patients diagnosed with severe sepsis or septic shock.       Comprehensive Metabolic Panel [[id number]]  (Abnormal) Collected: 09/27/24 1801    Specimen: Blood Updated: 09/27/24 1900     Glucose 117 mg/dL      BUN 6 mg/dL      Creatinine 0.77 mg/dL      Sodium 136 mmol/L      Potassium 3.6 mmol/L      Chloride 102 mmol/L      CO2 24.0 mmol/L      Calcium 8.7 mg/dL      Total Protein 6.9 g/dL      Albumin 4.0 g/dL      ALT (SGPT) 614 U/L      AST (SGOT) 614 U/L      Alkaline Phosphatase 203 U/L      Total Bilirubin 0.6 mg/dL      Globulin 2.9 gm/dL      Comment: Calculated Result        A/G Ratio 1.4 g/dL      BUN/Creatinine Ratio 7.8     Anion Gap 10.0 mmol/L      eGFR 109.3 mL/min/1.73     Narrative:      GFR Normal >60  Chronic Kidney Disease <60  Kidney Failure <15      Lipase [[id number]]  (Normal) Collected: 09/27/24 1801    Specimen: Blood Updated: 09/27/24 1900     Lipase 25 U/L     " Lactic Acid, Plasma [[id number]]  (Normal) Collected: 09/27/24 1801    Specimen: Blood Updated: 09/27/24 1854     Lactate 0.8 mmol/L      Comment: Falsely depressed results may occur on samples drawn from patients receiving N-Acetylcysteine (NAC) or Metamizole.       COVID PRE-OP / PRE-PROCEDURE SCREENING ORDER (NO ISOLATION) - Swab, Nasopharynx [[id number]]  (Normal) Collected: 09/27/24 1728    Specimen: Swab from Nasopharynx Updated: 09/27/24 1847    Narrative:      The following orders were created for panel order COVID PRE-OP / PRE-PROCEDURE SCREENING ORDER (NO ISOLATION) - Swab, Nasopharynx.  Procedure                               Abnormality         Status                     ---------                               -----------         ------                     COVID-19 and FLU A/B PCR...[[id number]]  Normal              Final result                 Please view results for these tests on the individual orders.    COVID-19 and FLU A/B PCR, 1 HR TAT - Swab, Nasopharynx [[id number]]  (Normal) Collected: 09/27/24 1728    Specimen: Swab from Nasopharynx Updated: 09/27/24 1847     COVID19 Not Detected     Influenza A PCR Not Detected     Influenza B PCR Not Detected    Narrative:      Fact sheet for providers: https://www.fda.gov/media/746164/download    Fact sheet for patients: https://www.fda.gov/media/076079/download    Test performed by PCR.    Urinalysis With Culture If Indicated - Urine, Clean Catch [[id number]]  (Abnormal) Collected: 09/27/24 1728    Specimen: Urine, Clean Catch Updated: 09/27/24 1831     Color, UA Dark Yellow     Appearance, UA Cloudy     pH, UA 8.0     Specific Gravity, UA 1.019     Glucose, UA Negative     Ketones, UA 15 mg/dL (1+)     Bilirubin, UA Negative     Blood, UA Negative     Protein,  mg/dL (2+)     Leuk Esterase, UA Trace     Nitrite, UA Negative     Urobilinogen, UA 1.0 E.U./dL    Narrative:      In absence of clinical symptoms, the presence of pyuria, bacteria, and/or  nitrites on the urinalysis result does not correlate with infection.    Urinalysis, Microscopic Only - Urine, Clean Catch [[id number]]  (Abnormal) Collected: 09/27/24 1728    Specimen: Urine, Clean Catch Updated: 09/27/24 1831     RBC, UA 3-5 /HPF      WBC, UA 0-2 /HPF      Bacteria, UA None Seen /HPF      Squamous Epithelial Cells, UA 7-12 /HPF      Hyaline Casts, UA 0-6 /LPF      Methodology Automated Microscopy    POC Urine Pregnancy [[id number]] Collected: 09/27/24 1733    Specimen: Urine Updated: 09/27/24 1734     HCG, Urine, QL Negative     Lot Number [id number]     Internal Positive Control Positive     Internal Negative Control Negative     Expiration Date 01/28/2025          Imaging Results (All)       Procedure Component Value Units Date/Time    US Liver [[id number]] Collected: 09/28/24 1328     Updated: 09/28/24 1336    Narrative:      US LIVER    Date of Exam: 9/28/2024 5:34 AM EDT    Indication: elevated liver enzymes.    Comparison: CT abdomen and pelvis 9/27/2024    Technique: Grayscale and color Doppler ultrasound evaluation of the right upper quadrant was performed.      Findings:  Visualized portions of the pancreas appear normal.    Normal appearance of the liver. No focal liver lesion. The portal vein is patent with hepatopetal flow. The middle hepatic vein is patent with normal waveform.    Normal appearance of the gallbladder.    Normal appearance of the common bile duct measuring 0.2 cm in diameter.    Normal appearance of the right kidney.      Impression:      Impression:  Normal right upper quadrant ultrasound.        Electronically Signed: John Gallardo MD    9/28/2024 1:33 PM EDT    Workstation ID: [id number]    MRI Lumbar Spine With & Without Contrast [[id number]] Collected: 09/27/24 1938     Updated: 09/27/24 1945    Narrative:        MRI LUMBAR SPINE W WO CONTRAST    Date of Exam: 9/27/2024 6:43 PM EDT    Indication: sepsis, midline lumbar spine pain, eval for infectious process.      Comparison: None available.    Technique:  Routine multiplanar/multisequence sequence images of the lumbar spine were obtained before and after the uneventful administration of Multihance.        Findings:  There is normal height, alignment, and signal intensity of the lumbar vertebral bodies. Spinal cord terminates at the L1 level with normal signal seen within the conus.    Visualized paraspinal soft tissues appear within normal limits.    Postcontrast images demonstrate no pathologic contrast enhancement.    Axial images demonstrate:    L1/2: No significant disc bulge. Facet joints are within normal limits. No spinal canal stenosis or neural foraminal narrowing.    L2/3: No significant disc bulge. Facet joints appear within normal limits. No spinal canal stenosis or neural foraminal narrowing.    L3/4: No significant disc bulge. Facet joints are within normal limits. No spinal canal stenosis or neural foraminal narrowing.    L4/5: No significant disc bulge. There are mild degenerative facet changes bilaterally. No spinal canal stenosis or neural foraminal narrowing.    5/S1: No significant disc bulge. There are mild degenerative facet changes left greater than right. No spinal canal stenosis or neural foraminal narrowing.      Impression:      Impression:    1. Mild degenerative facet change at the L4/5 and L5/S1 levels. There is no focal disc protrusion, spinal canal stenosis, or neural foraminal narrowing identified.        Electronically Signed: Sal Pacheco MD    9/27/2024 7:42 PM EDT    Workstation ID: [id number]    CT Abdomen Pelvis With Contrast [[id number]] Collected: 09/27/24 1826     Updated: 09/27/24 1835    Narrative:      CT ABDOMEN PELVIS W CONTRAST    Date of Exam: 9/27/2024 6:10 PM EDT    Indication: low back pain, fever, tachycardia.    Comparison: None available.    Technique: Axial CT images were obtained of the abdomen and pelvis following the uneventful intravenous administration of  Isovue-300, 97 mL. Reconstructed coronal and sagittal images were also obtained. Automated exposure control and iterative   construction methods were used.      Findings:    Lung Bases:  The visualized lung bases and lower mediastinal structures are unremarkable.    Peritoneum:  No free intraperitoneal air is visualized. Small to moderate volume free fluid is visualized in the pelvis.    Abdominal wall:  Unremarkable.    Liver:  Liver is normal in size and contour. No focal lesions.    Biliary/Gallbladder:  The gallbladder is normal without evidence of radiopaque gallstones. The biliary tree is nondilated.    Pancreas:  Pancreas is within normal limits. There is no evidence of pancreatic mass or peripancreatic inflammatory changes.    Spleen:  Spleen is normal in size and contour.    Gastrointestinal/Mesentery:   No evidence of bowel obstruction or gross inflammatory changes. The appendix appears within normal limits.      Adrenals:  Adrenal glands are unremarkable.    Kidneys:  The kidneys are in anatomic position. No evidence of nephrolithiasis. No evidence of hydronephrosis or significant perinephric fat stranding.    Bladder:   The urinary bladder is unremarkable.    Reproductive organs:    The uterus and ovaries appear within millimeters on CT.    Lymph Nodes:  No significant adenopathy is identified.     Vasculature:  Unremarkable.    Osseous Structures:    No acute fracture or aggressive lesions.        Impression:      Impression:    Small to moderate volume free fluid in the pelvis. Finding may be physiologic and possibly secondary to recently ruptured ovarian cyst however, in the setting of fever or signs of sepsis, underlying infectious process not excluded. Clinically correlate.    Otherwise, unremarkable contrast-enhanced CT of the abdomen and pelvis.        Electronically Signed: Albert Chau MD    9/27/2024 6:32 PM EDT    Workstation ID: [id number]          ECG/EMG Results (all)       Procedure  Component Value Units Date/Time    ECG 12 Lead Tachycardia [[id number]] Collected: 09/27/24 2208     Updated: 09/28/24 0811     QT Interval 368 ms      QTC Interval 445 ms     Narrative:      Test Reason : Tachycardia  Blood Pressure :   */*   mmHG  Vent. Rate :  88 BPM     Atrial Rate :  88 BPM     P-R Int : 156 ms          QRS Dur :  74 ms      QT Int : 368 ms       P-R-T Axes :  24  12   0 degrees     QTc Int : 445 ms    Normal sinus rhythm  Poor R wave progression   Abnormal ECG  No previous ECGs available  Confirmed by Art Richardson ([id number]) on 9/28/2024 8:11:30 AM    Referred By:            Confirmed By: Art Richardson

## 2024-09-30 NOTE — PROGRESS NOTES
INFECTIOUS DISEASE PROGRESS NOTE    Madalyn Alexandre  2000  [id number]    Date of Consult: 9/28/2024    Admission Date: 9/27/2024      Requesting Provider: Ellie Baumann MD  Evaluating Physician: Ozzy Givens MD    Reason for Consultation: Sepsis    History of present illness:    Patient is a 24 y.o. female with h/o chlamydia, irregular menses, vaping, and marijuana use who presented to [facility] ED on 9/27 with low back pain with acute onset of fevers, rigors, nausea, and vomiting.  She was recently seen at [facility] for the back pain and started on muscle relaxers that she stated do help.  She has a pet cat and her boyfriend has a pet dog that goes outside and has risk for tick-borne illness.  On arrival to the ED, the patient had a Tmax of 102.5 and HR of 121.  Initial labs were WBC 2500 with 82% neutrophils, lactic acid 0.8, PCT 0.69, , , and bilirubin 0.6.  A respiratory panel PCR was negative.  A UA WBC was 0-2 with negative culture to date. A hepatitis panel was negative.  An HIV was negative.  A Monospot was negative.  CMV and EBV serologies are pending. Blood cultures are pending.  A CT/GC/trichomonas PCR is pending. A MRSA PCR is negative.  A tick-borne work up is pending.  Her ALT and AST are worse at 917 and 869, respectively, on 9/28 and her WBC is 2,900 with 51% segs/32% bands.  A CT scan of a/p with contrast on 9/27 showed small to moderate free pelvic fluid c/w possible ruptured ovarian cyst.  An MRI of lumbar spine showed mild degenerative changes.  A liver US showed no acute findings. She is currently on Vancomycin and Zosyn.  ID was asked to evaluate and manage her antibiotic therapy.     9/29/24: Tmax 103.1, currently 99.3.  WBC improved to 3.42 with 37% segs/44% bands, plts better to 133, ALT worse to 2972, and AST worse to 3202, Bilirubin worse to 2.1.  UDS negative. HCG negative. Lyme EIA negative. CMV negative. She denies n/v/d, rashes, soa/cough, no headaches, and no  pain.    9/30/2024: She had recurrent fevers to 102.6.   Blood cultures have remained negative.  A repeat blood culture was ordered this morning.   Her liver enzymes have continued to worsen with an ALT of 5620 600 total bilirubin of 2.8.  Prothrombin time is 18.1.  EBV and CMV PCR's are pending.  Ehrlichia and rickettsial PCR's are pending.  She denies nausea and vomiting.  She denies any recent foraging with mushroom consumption.     Past Medical History:   Diagnosis Date    Abnormal Pap smear of cervix 4/12/22    Chlamydia 10/06/2020    Elevated prolactin level     Irregular menses        Past Surgical History:   Procedure Laterality Date    NO PAST SURGERIES         Family History   Problem Relation Age of Onset    No Known Problems Other     Diabetes type II Maternal Grandmother        Social History     Socioeconomic History    Marital status: Single   Tobacco Use    Smoking status: Never    Smokeless tobacco: Never   Vaping Use    Vaping status: Former    Substances: Nicotine, Flavoring    Devices: Refillable tank   Substance and Sexual Activity    Alcohol use: No    Drug use: Not Currently     Types: Marijuana    Sexual activity: Yes     Partners: Male     Birth control/protection: Condom, None     Comment: occasional use       Allergies   Allergen Reactions    Vancomycin Itching     Infusion rate related         Medication:    Current Facility-Administered Medications:     [COMPLETED] acetylcysteine (ACETADOTE) 8,280 mg in dextrose (D5W) 5 % 200 mL infusion, 150 mg/kg, Intravenous, Once, Last Rate: 241.4 mL/hr at 09/29/24 0251, 8,280 mg at 09/29/24 0251 **FOLLOWED BY** [COMPLETED] acetylcysteine (ACETADOTE) 2,760 mg in dextrose (D5W) 5 % 500 mL infusion, 50 mg/kg, Intravenous, Once, Last Rate: 125 mL/hr at 09/29/24 0009, 2,760 mg at 09/29/24 0009 **FOLLOWED BY** acetylcysteine (ACETADOTE) 6,000 mg in dextrose (D5W) 5 % 1,000 mL infusion, 6.25 mg/kg/hr, Intravenous, Continuous, Brunner, Mark I, MD, Last  Rate: 57.5 mL/hr at 09/29/24 1616, 6.25 mg/kg/hr at 09/29/24 1616    sennosides-docusate (PERICOLACE) 8.6-50 MG per tablet 2 tablet, 2 tablet, Oral, BID PRN **AND** polyethylene glycol (MIRALAX) packet 17 g, 17 g, Oral, Daily PRN **AND** bisacodyl (DULCOLAX) EC tablet 5 mg, 5 mg, Oral, Daily PRN **AND** bisacodyl (DULCOLAX) suppository 10 mg, 10 mg, Rectal, Daily PRN, Janette Shukla APRN    doxycycline (VIBRAMYCIN) 100 mg in sodium chloride 0.9 % 100 mL MBP, 100 mg, Intravenous, Q12H, Hay Waters PA, 100 mg at 09/30/24 0614    ibuprofen (ADVIL,MOTRIN) tablet 400 mg, 400 mg, Oral, Once, Cullen Baumann MD    ondansetron (ZOFRAN) injection 4 mg, 4 mg, Intravenous, Q6H PRN, Janette Shukla APRN, 4 mg at 09/29/24 1820    prochlorperazine (COMPAZINE) injection 5 mg, 5 mg, Intravenous, Q6H PRN, Ellie Baumann MD, 5 mg at 09/29/24 1144    sodium chloride 0.9 % flush 10 mL, 10 mL, Intravenous, Q12H, Janette Shukla APRN, 10 mL at 09/29/24 0841    sodium chloride 0.9 % flush 10 mL, 10 mL, Intravenous, PRN, Janette Shukla APRN    sodium chloride 0.9 % infusion 40 mL, 40 mL, Intravenous, PRN, Janette Shukla, APRN    sodium chloride 0.9 % infusion, 100 mL/hr, Intravenous, Continuous, Janette Shukla APRN, Last Rate: 100 mL/hr at 09/29/24 0846, 100 mL/hr at 09/29/24 0846    Antibiotics:  Anti-Infectives (From admission, onward)      Ordered     Dose/Rate Route Frequency Start Stop    09/28/24 1514  doxycycline (VIBRAMYCIN) 100 mg in sodium chloride 0.9 % 100 mL MBP        Ordering Provider: Hay Waters PA    100 mg  over 60 Minutes Intravenous Every 12 Hours 09/28/24 1800 10/05/24 1759    09/27/24 2215  vancomycin (VANCOCIN) 1,000 mg in sodium chloride 0.9 % 250 mL IVPB-VTB        Ordering Provider: Jamee Manriquez RPH    20 mg/kg × 55.2 kg  250 mL/hr over 60 Minutes Intravenous Once 09/27/24 2315 09/27/24 2343    09/27/24 2027  piperacillin-tazobactam (ZOSYN) 3.375 g IVPB in 100 mL NS MBP (CD)         Ordering Provider: Bacilio Espino MD    3.375 g  over 30 Minutes Intravenous Once 24 2100 24 2006              Review of Systems:  See above.       Physical Exam:   Vital Signs  Temp (24hrs), Av.9 °F (38.3 °C), Min:99.1 °F (37.3 °C), Max:102.7 °F (39.3 °C)    Temp  Min: 99.1 °F (37.3 °C)  Max: 102.7 °F (39.3 °C)  BP  Min: 104/88  Max: 123/64  Pulse  Min: 60  Max: 88  Resp  Min: 17  Max: 18  SpO2  Min: 94 %  Max: 100 %    GENERAL: Awake and alert, in no acute distress.   HEENT: Normocephalic, atraumatic.  PERRL. EOMI. No conjunctival injection. No icterus. Oropharynx clear without evidence of thrush or exudate.  NECK: Supple   HEART: RRR; No murmur, rubs, gallops.   LUNGS: Clear to auscultation bilaterally without wheezing, rales, rhonchi. Normal respiratory effort. Nonlabored.  ABDOMEN: Soft, nontender, nondistended. No rebound or guarding. NO mass or HSM.  EXT:  No cyanosis, clubbing or edema. No cord.  :  Without Su catheter.  MSK: No joint effusions or erythema  SKIN: Warm and dry without cutaneous eruptions on Inspection/palpation.    NEURO: Oriented to PPT.  Motor 5/5 strength  PSYCHIATRIC: Normal insight and judgment. Cooperative with PE    Laboratory Data    Results from last 7 days   Lab Units 24  0041 24  0640 24  0718   WBC 10*3/mm3 4.10 3.42 2.89*   HEMOGLOBIN g/dL 13.0 13.6 13.5   HEMATOCRIT % 37.4 39.4 39.7   PLATELETS 10*3/mm3 118* 133* 123*     Results from last 7 days   Lab Units 24  0041   SODIUM mmol/L 133*   POTASSIUM mmol/L 3.5   CHLORIDE mmol/L 96*   CO2 mmol/L 21.0*   BUN mg/dL 4*   CREATININE mg/dL 0.60   GLUCOSE mg/dL 108*   CALCIUM mg/dL 8.2*     Results from last 7 days   Lab Units 24  0041   ALK PHOS U/L 228*   BILIRUBIN mg/dL 2.8*   ALT (SGPT) U/L 5,626*   AST (SGOT) U/L 6,391*             Results from last 7 days   Lab Units 24  1801   LACTATE mmol/L 0.8     Results from last 7 days   Lab Units 24  0718   CK TOTAL  U/L 57         Estimated Creatinine Clearance: 110.8 mL/min (by C-G formula based on SCr of 0.6 mg/dL).      Microbiology:  Microbiology Results (last 10 days)       Procedure Component Value - Date/Time    MRSA Screen, PCR (Inpatient) - Swab, Nares [[id number]]  (Normal) Collected: 09/27/24 2250    Lab Status: Final result Specimen: Swab from Nares Updated: 09/28/24 0906     MRSA PCR Negative    Narrative:      The negative predictive value of this diagnostic test is high and should only be used to consider de-escalating anti-MRSA therapy. A positive result may indicate colonization with MRSA and must be correlated clinically.  MRSA Negative    Blood Culture - Blood, Wrist, Right [[id number]]  (Normal) Collected: 09/27/24 2039    Lab Status: Preliminary result Specimen: Blood from Wrist, Right Updated: 09/29/24 2131     Blood Culture No growth at 2 days    Blood Culture - Blood, Arm, Right [[id number]]  (Normal) Collected: 09/27/24 1801    Lab Status: Preliminary result Specimen: Blood from Arm, Right Updated: 09/29/24 1916     Blood Culture No growth at 2 days    COVID PRE-OP / PRE-PROCEDURE SCREENING ORDER (NO ISOLATION) - Swab, Nasopharynx [[id number]]  (Normal) Collected: 09/27/24 1728    Lab Status: Final result Specimen: Swab from Nasopharynx Updated: 09/27/24 1847    Narrative:      The following orders were created for panel order COVID PRE-OP / PRE-PROCEDURE SCREENING ORDER (NO ISOLATION) - Swab, Nasopharynx.  Procedure                               Abnormality         Status                     ---------                               -----------         ------                     COVID-19 and FLU A/B PCR...[[id number]]  Normal              Final result                 Please view results for these tests on the individual orders.    COVID-19 and FLU A/B PCR, 1 HR TAT - Swab, Nasopharynx [[id number]]  (Normal) Collected: 09/27/24 1728    Lab Status: Final result Specimen: Swab from Nasopharynx Updated: 09/27/24  1847     COVID19 Not Detected     Influenza A PCR Not Detected     Influenza B PCR Not Detected    Narrative:      Fact sheet for providers: https://www.fda.gov/media/563937/download    Fact sheet for patients: https://www.fda.gov/media/644088/download    Test performed by PCR.    Urine Culture - Urine, Urine, Clean Catch [[id number]] Collected: 09/27/24 1728    Lab Status: Final result Specimen: Urine, Clean Catch Updated: 09/29/24 0941     Urine Culture >100,000 CFU/mL Mixed Beena Isolated    Narrative:      Specimen contains mixed organisms of questionable pathogenicity suggestive of contamination. If symptoms persist, suggest recollection.  Colonization of the urinary tract without infection is common. Treatment is discouraged unless the patient is symptomatic, pregnant, or undergoing an invasive urologic procedure.    Respiratory Panel PCR w/COVID-19(SARS-CoV-2) ALBERTO/ALIN/HAKAN/PAD/COR/RASHI In-House, NP Swab in UTM/VTM, 2 HR TAT - Swab, Nasopharynx [[id number]]  (Normal) Collected: 09/27/24 1728    Lab Status: Final result Specimen: Swab from Nasopharynx Updated: 09/27/24 2200     ADENOVIRUS, PCR Not Detected     Coronavirus 229E Not Detected     Coronavirus HKU1 Not Detected     Coronavirus NL63 Not Detected     Coronavirus OC43 Not Detected     COVID19 Not Detected     Human Metapneumovirus Not Detected     Human Rhinovirus/Enterovirus Not Detected     Influenza A PCR Not Detected     Influenza B PCR Not Detected     Parainfluenza Virus 1 Not Detected     Parainfluenza Virus 2 Not Detected     Parainfluenza Virus 3 Not Detected     Parainfluenza Virus 4 Not Detected     RSV, PCR Not Detected     Bordetella pertussis pcr Not Detected     Bordetella parapertussis PCR Not Detected     Chlamydophila pneumoniae PCR Not Detected     Mycoplasma pneumo by PCR Not Detected    Narrative:      In the setting of a positive respiratory panel with a viral infection PLUS a negative procalcitonin without other underlying concern  for bacterial infection, consider observing off antibiotics or discontinuation of antibiotics and continue supportive care. If the respiratory panel is positive for atypical bacterial infection (Bordetella pertussis, Chlamydophila pneumoniae, or Mycoplasma pneumoniae), consider antibiotic de-escalation to target atypical bacterial infection.                  Radiology:  Imaging Results (Last 72 Hours)       Procedure Component Value Units Date/Time    US Liver [[id number]] Collected: 09/28/24 1328     Updated: 09/28/24 1336    Narrative:      US LIVER    Date of Exam: 9/28/2024 5:34 AM EDT    Indication: elevated liver enzymes.    Comparison: CT abdomen and pelvis 9/27/2024    Technique: Grayscale and color Doppler ultrasound evaluation of the right upper quadrant was performed.      Findings:  Visualized portions of the pancreas appear normal.    Normal appearance of the liver. No focal liver lesion. The portal vein is patent with hepatopetal flow. The middle hepatic vein is patent with normal waveform.    Normal appearance of the gallbladder.    Normal appearance of the common bile duct measuring 0.2 cm in diameter.    Normal appearance of the right kidney.      Impression:      Impression:  Normal right upper quadrant ultrasound.        Electronically Signed: John Gallardo MD    9/28/2024 1:33 PM EDT    Workstation ID: [id number]    MRI Lumbar Spine With & Without Contrast [[id number]] Collected: 09/27/24 1938     Updated: 09/27/24 1945    Narrative:        MRI LUMBAR SPINE W WO CONTRAST    Date of Exam: 9/27/2024 6:43 PM EDT    Indication: sepsis, midline lumbar spine pain, eval for infectious process.     Comparison: None available.    Technique:  Routine multiplanar/multisequence sequence images of the lumbar spine were obtained before and after the uneventful administration of Multihance.        Findings:  There is normal height, alignment, and signal intensity of the lumbar vertebral bodies. Spinal cord  terminates at the L1 level with normal signal seen within the conus.    Visualized paraspinal soft tissues appear within normal limits.    Postcontrast images demonstrate no pathologic contrast enhancement.    Axial images demonstrate:    L1/2: No significant disc bulge. Facet joints are within normal limits. No spinal canal stenosis or neural foraminal narrowing.    L2/3: No significant disc bulge. Facet joints appear within normal limits. No spinal canal stenosis or neural foraminal narrowing.    L3/4: No significant disc bulge. Facet joints are within normal limits. No spinal canal stenosis or neural foraminal narrowing.    L4/5: No significant disc bulge. There are mild degenerative facet changes bilaterally. No spinal canal stenosis or neural foraminal narrowing.    5/S1: No significant disc bulge. There are mild degenerative facet changes left greater than right. No spinal canal stenosis or neural foraminal narrowing.      Impression:      Impression:    1. Mild degenerative facet change at the L4/5 and L5/S1 levels. There is no focal disc protrusion, spinal canal stenosis, or neural foraminal narrowing identified.        Electronically Signed: Sal Pacheco MD    9/27/2024 7:42 PM EDT    Workstation ID: [id number]    CT Abdomen Pelvis With Contrast [[id number]] Collected: 09/27/24 1826     Updated: 09/27/24 1835    Narrative:      CT ABDOMEN PELVIS W CONTRAST    Date of Exam: 9/27/2024 6:10 PM EDT    Indication: low back pain, fever, tachycardia.    Comparison: None available.    Technique: Axial CT images were obtained of the abdomen and pelvis following the uneventful intravenous administration of Isovue-300, 97 mL. Reconstructed coronal and sagittal images were also obtained. Automated exposure control and iterative   construction methods were used.      Findings:    Lung Bases:  The visualized lung bases and lower mediastinal structures are unremarkable.    Peritoneum:  No free intraperitoneal air is  visualized. Small to moderate volume free fluid is visualized in the pelvis.    Abdominal wall:  Unremarkable.    Liver:  Liver is normal in size and contour. No focal lesions.    Biliary/Gallbladder:  The gallbladder is normal without evidence of radiopaque gallstones. The biliary tree is nondilated.    Pancreas:  Pancreas is within normal limits. There is no evidence of pancreatic mass or peripancreatic inflammatory changes.    Spleen:  Spleen is normal in size and contour.    Gastrointestinal/Mesentery:   No evidence of bowel obstruction or gross inflammatory changes. The appendix appears within normal limits.      Adrenals:  Adrenal glands are unremarkable.    Kidneys:  The kidneys are in anatomic position. No evidence of nephrolithiasis. No evidence of hydronephrosis or significant perinephric fat stranding.    Bladder:   The urinary bladder is unremarkable.    Reproductive organs:    The uterus and ovaries appear within millimeters on CT.    Lymph Nodes:  No significant adenopathy is identified.     Vasculature:  Unremarkable.    Osseous Structures:    No acute fracture or aggressive lesions.        Impression:      Impression:    Small to moderate volume free fluid in the pelvis. Finding may be physiologic and possibly secondary to recently ruptured ovarian cyst however, in the setting of fever or signs of sepsis, underlying infectious process not excluded. Clinically correlate.    Otherwise, unremarkable contrast-enhanced CT of the abdomen and pelvis.        Electronically Signed: Albert Chau MD    9/27/2024 6:32 PM EDT    Workstation ID: [id number]              Impression:   Hepatitis-with  progressively worsening transaminitis and hyperbilirubinemia along with coagulopathy.  This is most likely secondary to viral infection.  There is no current evidence of hepatotoxin exposure.  We cannot exclude drug-induced hepatitis but this seems less likely.  Rickettsial infection appears less likely since her  hepatitis has worsened and her fevers have persisted despite doxycycline.   Her CMV serology was negative but this does not exclude an acute CMV infection.  A CMV PCR is pending.  Her hepatitis B surface antigen was negative but occasionally hepatitis A can cause severe hepatitis with positive surface antigen  and I have ordered a Hepatitis B DNA PCR.  Laboratory studies to evaluate to evaluate for autoimmune hepatitis are pending.   I discussed her situation with Dr. Brunner.  We will try to arrange for transfer to a tertiary referral to hepatology center, most likely .  Fevers-persistent  Lower back pain, with no focal infection visualized on MRI.  Nausea and vomiting with CT scan that showed some free pelvic fluid that may be c/w ruptured ovarian cyst.  Leukopenia, improved  Thrombocytopenia  Elevated procalcitonin  Coagulopathy.      PLAN/RECOMMENDATIONS:   Repeat blood culture-pending  EBV and CMV PCR studies-pending  EBV serology  Hepatitis B DNA PCR  Continue IV Doxycycline   Avoid acetaminophen  Continue N-acetylcysteine protocol  Markers for autoimmune disease- pending     I discussed her complex situation in detail with Dr. Brunner.   I discussed her situation in detail with her and her mother today.   I spent over 50 minutes on her  high complexity medical condition today.    Ozzy Givens MD  9/30/2024  07:24 EDT

## 2024-09-30 NOTE — PROGRESS NOTES
"GI Daily Progress Note  Subjective:    Chief Complaint: Follow-up acute hepatitis.    The patient's back pain remains improved.  She is eating some.  No vomiting.  No abdominal pain.  Had fevers overnight to 102.7.    Objective:    /78 (BP Location: Right arm, Patient Position: Lying)   Pulse 78   Temp 99.1 °F (37.3 °C) (Oral)   Resp 16   Ht 152.4 cm (60\")   Wt 55.2 kg (121 lb 9.6 oz)   LMP 09/10/2024 (Exact Date)   SpO2 96%   BMI 23.75 kg/m²     Physical Exam  Constitutional:       General: She is not in acute distress.     Appearance: She is ill-appearing. She is not toxic-appearing or diaphoretic.   HENT:      Head: Normocephalic.      Nose: Nose normal.      Mouth/Throat:      Mouth: Mucous membranes are moist.   Eyes:      General: Scleral icterus present.   Cardiovascular:      Rate and Rhythm: Normal rate.      Pulses: Normal pulses.   Pulmonary:      Effort: Pulmonary effort is normal. No respiratory distress.   Abdominal:      General: There is no distension.   Musculoskeletal:      Cervical back: Normal range of motion.      Right lower leg: No edema.      Left lower leg: No edema.   Skin:     Coloration: Skin is not jaundiced.   Neurological:      General: No focal deficit present.      Mental Status: She is alert and oriented to person, place, and time.      Comments: No asterixis.   Psychiatric:         Mood and Affect: Mood normal.         Behavior: Behavior normal.     Lab  Lab Results   Component Value Date    WBC 4.10 09/30/2024    HGB 13.0 09/30/2024    HGB 13.6 09/29/2024    HGB 13.5 09/28/2024    MCV 91.7 09/30/2024     (L) 09/30/2024    INR 1.49 (H) 09/30/2024    INR 1.27 (H) 09/29/2024    INR 1.33 (H) 09/27/2024     Lab Results   Component Value Date    GLUCOSE 108 (H) 09/30/2024    BUN 4 (L) 09/30/2024    CREATININE 0.60 09/30/2024    BCR 6.7 (L) 09/30/2024     (L) 09/30/2024    K 3.5 09/30/2024    CO2 21.0 (L) 09/30/2024    CALCIUM 8.2 (L) 09/30/2024    ALBUMIN 3.5 " 09/30/2024    ALKPHOS 228 (H) 09/30/2024    BILITOT 2.8 (H) 09/30/2024    ALT 5,626 (H) 09/30/2024    AST 6,391 (H) 09/30/2024      Latest Reference Range & Units 09/27/24 18:01 09/28/24 07:18 09/29/24 06:40 09/30/24 00:41   Alkaline Phosphatase 39 - 117 U/L 203 (H) 178 (H) 251 (H) 228 (H)   Total Protein 6.0 - 8.5 g/dL 6.9 6.1 6.5 6.1   Albumin 3.5 - 5.2 g/dL 4.0 3.6 3.5 3.5   Globulin gm/dL 2.9 2.5 3.0 2.6   A/G Ratio g/dL 1.4 1.4 1.2 1.3   AST (SGOT) 1 - 32 U/L 614 (H) 869 (H) 3,202 (H) 6,391 (H)   ALT (SGPT) 1 - 33 U/L 614 (H) 917 (H) 2,972 (H) 5,626 (H)   Total Bilirubin 0.0 - 1.2 mg/dL 0.6 1.0 2.1 (H) 2.8 (H)      Latest Reference Range & Units 09/27/24 20:39   Hep A IgM Non-Reactive  Non-Reactive   Hepatitis B Surface Ag Non-Reactive  Non-Reactive   Hep B Core IgM Non-Reactive  Non-Reactive   Hepatitis C Ab Non-Reactive  Non-Reactive   CMV IgG 0.00 - 0.59 U/mL <0.60   CMV IgM 0.0 - 29.9 AU/mL <30.0   EBV Nuclear Antigen Ab, IgG 0.0 - 17.9 U/mL 393.0 (H)   EBV VCA IgG 0.0 - 17.9 U/mL 213.0 (H)   EBV VCA IgM 0.0 - 35.9 U/mL <36.0      Latest Reference Range & Units 09/27/24 18:01 09/28/24 07:18   Ammonia 11 - 51 umol/L  10 (L)   Ceruloplasmin 19 - 39 mg/dL  24   Lactate 0.5 - 2.0 mmol/L 0.8    Lipase 13 - 60 U/L 25       Latest Reference Range & Units 09/27/24 20:39   Monospot Negative  Negative   HIV-1/ HIV-2 Ab Non-Reactive  Non-Reactive      Latest Reference Range & Units 09/28/24 18:43   RNP Antibodies 0.0 - 0.9 AI <0.2   Espino Antibodies 0.0 - 0.9 AI <0.2   Sjogren's Anti-SS-A 0.0 - 0.9 AI <0.2   Sjogren's Anti-SS-B 0.0 - 0.9 AI <0.2   TYLER-1 IgG 0.0 - 0.9 AI <0.2      Latest Reference Range & Units 09/28/24 18:43   Anti-Centromere B Antibodies 0.0 - 0.9 AI <0.2   Antichromatin Antibodies 0.0 - 0.9 AI <0.2   Anti-DNA (DS) Ab Qn 0 - 9 IU/mL 1   TYLER-1 IgG 0.0 - 0.9 AI <0.2   Antiscleroderma-70 Antibodies 0.0 - 0.9 AI <0.2     Assessment:    Acute marked transaminase elevation, worsening. Worsening bilirubin  elevation. Receiving NAC protocol for possible Tylenol toxicity and possible impending liver failure. DILI from naproxen is not felt likely. Favor infectious etiology of hepatitis. ID consultant is covering tick-born diseases (doxycycline), pending serology/PCR results. Liver vasculature appeared normal on RUQ duplex US.  Hepatitis panel, CMV serology, and Monospot negative.  EBV serology shows prior resolved infection. Ceruloplasmin, CK, and drug screen are negative.  JUSTICE comprehensive panel is negative.  Fever, ongoing.  Blood cultures negative at 2 days.  Back pain, improved. MRI spine unremarkable. No evidence for pyelonephritis on CT. Urine culture: mixed acosta (contaminated).  Leukopenia with left shift and absolute lymphocytopenia, resolved.  Thrombocytopenia, persistent.  Coagulopathy, worsening. Not responsive to vitamin K.    Plan:    >> Avoid naproxen.  >> Complete non-APAP N-acetylcysteine protocol.  >> Awaiting results of quantitative hepatitis B DNA, quantitative EBV, smooth muscle antibody, mitochondrial antibody, SPEP, Ehrlichia DNA PCR, and rickettsial DNA PCR.  >> Coagulopathy, hyperbilirubinemia, and transaminase elevations are worsening.  She is developing hyponatremia and metabolic acidosis. Recommend transfer to [facility] center with capability of liver transplant/hepatology.    Mark I. Brunner, MD  09/30/24  16:42 EDT

## 2024-10-01 LAB
ALBUMIN SERPL ELPH-MCNC: 3.2 G/DL (ref 2.9–4.4)
ALBUMIN/GLOB SERPL: 1.1 {RATIO} (ref 0.7–1.7)
ALPHA1 GLOB SERPL ELPH-MCNC: 0.4 G/DL (ref 0–0.4)
ALPHA2 GLOB SERPL ELPH-MCNC: 0.7 G/DL (ref 0.4–1)
B-GLOBULIN SERPL ELPH-MCNC: 0.8 G/DL (ref 0.7–1.3)
EBV NA IGG SER IA-ACNC: 438 U/ML (ref 0–17.9)
EBV VCA IGG SER IA-ACNC: 215 U/ML (ref 0–17.9)
EBV VCA IGM SER IA-ACNC: <36 U/ML (ref 0–35.9)
GAMMA GLOB SERPL ELPH-MCNC: 1 G/DL (ref 0.4–1.8)
GLOBULIN SER CALC-MCNC: 2.8 G/DL (ref 2.2–3.9)
HBV DNA SERPL NAA+PROBE-ACNC: NORMAL IU/ML
HBV DNA SERPL NAA+PROBE-LOG IU: NORMAL LOG10 IU/ML
LABORATORY COMMENT REPORT: NORMAL
M PROTEIN SERPL ELPH-MCNC: NORMAL G/DL
MITOCHONDRIA M2 IGG SER-ACNC: <20 UNITS (ref 0–20)
PROT PATTERN SERPL ELPH-IMP: NORMAL
PROT SERPL-MCNC: 6 G/DL (ref 6–8.5)
REF LAB TEST REF RANGE: NORMAL
SERVICE CMNT-IMP: ABNORMAL
SMA IGG SER-ACNC: 6 UNITS (ref 0–19)

## 2024-10-01 NOTE — PAYOR COMM NOTE
"Mae Phillips RN  [facility]  Utilization Management  P:[phone]  F:[phone]    Auth # [id number]     Madalyn Madrigal \"Rosalva\" (24 y.o. Female)       Date of Birth   2000    Social Security Number       Address   [address] [place] [address]    Home Phone   [phone]    MRN   [id number]       [place]   Unknown    Marital Status   Single                            Admission Date   24    Admission Type   Emergency    Admitting Provider   Ellie Baumann MD    Attending Provider       Department, Room/Bed   [facility] 2F, S212/1       Discharge Date   2024    Discharge Disposition   [facility] (DC - External)    Discharge Destination                                 Attending Provider: (none)   Allergies: Vancomycin    Isolation: None   Infection: None   Code Status: Prior    Ht: 152.4 cm (60\")   Wt: 55.2 kg (121 lb 9.6 oz)    Admission Cmt: None   Principal Problem: Sepsis [A41.9]                   Active Insurance as of 2024       Primary Coverage       Payor Plan Insurance Group Employer/Plan Group    [employer]HE[employer] BLUE CROSS AN[employer] BLUE CROSS BLUE SHIELD PPO [id number]       Payor Plan Address Payor Plan Phone Number Payor Plan Fax Number Effective Dates    [address] [phone]  2024 - None Entered    [place] [address]         Subscriber Name Subscriber Birth Date Member ID       MADALYN MADRIGAL 2000 [id number]                     Emergency Contacts        (Rel.) Home Phone Work Phone Mobile Phone    scott madrigal (Mother) [phone] -- [phone]                 Discharge Summary        Ellie Baumann MD at 24 0806              [facility]  DISCHARGE SUMMARY    Patient Name: Madalyn Madrigal  : 2000  MRN: [id number]    Date of Admission: 2024  5:06 PM  Date of Discharge:  2024  Primary Care Physician: Provider, No " Known    Consults       Date and Time Order Name Status Description    9/28/2024  1:23 PM Inpatient Infectious Diseases Consult Completed     9/28/2024  3:55 AM Inpatient Obstetrics / Gynecology Consult      9/27/2024  9:53 PM Inpatient Gastroenterology Consult Completed             Hospital Course     Presenting Problem: sepsis    Active Hospital Problems    Diagnosis  POA    **Sepsis [A41.9]  Yes    Elevated liver enzymes [R74.8]  Yes    Acute bilateral low back pain without sciatica [M54.50]  Yes    Elevated prolactin level [R79.89]  Yes      Resolved Hospital Problems   No resolved problems to display.          Hospital Course:  Madalyn Alexandre is a 24 y.o. female with no significant PMH other than STI (Chlamydia) in 2020 who presented with fever of unknown etiology. She was found to have fever on admission with elevated transaminases and thrombocytopenia. At this time, leading diagnosis is tick borne illness, however, pt continues to have fevers and LFTs have continued to worsen.  Given her worsening liver function, feel it is better for her to be at a transplant center for closer monitoring and I have spoken with  who have accepted her for transfer.        Sepsis of unclear etiology: Leukopenia, fever, tachycardia, elevated procalcitonin.    - IV fluid bolus given in the ED, continue gentle IV fluid  - Vancomycin and Zosyn started in the ED, stopped Vanc given negative MRSA PCR.  ID added on Doxycycline   - BC x 2 NGTD  - Urine culture NGTD  - ID consulted, appreciate Dr. Givens   - Monospot, HIV negative  - CBC, CMP in the a.m.  -- Patient with pelvic exam in the ED, later informed apparently swab was not sent to lab, patient does have previous history of Chlamydia, ? Repeat pelvic exam however discussed with Dr. Espino who did Pelvic exam, no discharge, no tenderness, cervix appeared normal   -- Free fluid is noted on CT abd/pelvis though, consider possible GYN consult but no abdominal pain or complaints  on exam at this time as thought to possibly be secondary to recently ruptured ovarian cyst   -- urine GC/chlamydia PENDING  -- need to rule out tick borne illness given below, sent for Ehrlichia, lyme, etc- no tick exposure per patient but she does have a dog who is outside frequently per ID   -- continues to be febrile and unable to tolerate acetaminophen and/or much ibuprofen due to below.  Okay for one time dose of Motrin this am per GI.  Continue ice packs, cooling blankets as needed.       Acutely elevated liver enzymes  Coagulopathy   Thrombocytopenia (mild)  - Acute hep panel negative  - CT abdomen/pelvis negative for acute findings besides some small to moderate free fluid in pelvis as above   - Liver ultrasound unremarkable   - GI consulted, appreciate Dr. Brunner's assistance  -- LFTs continue to worsen to 6400/5000 AST/ALT this am and tbili worse. INR also now worse at 1.49.    -- discussed with Dr. Brunner and advised transfer to a facility with transplant services available.  I called over to  and discussed with Dr. Eliceo Lutz (Internal Medicine) who accepted the patient.  -- duplex ordered to evaluate for Budd-Chiari, etc.   -- checking for tick borne illnesses as noted above, EBV/CMV PENDING   -- of note, no offending medications/toxin exposures (pt denies any supplement use other than biotin and collagen. She denies any OCP/hormonal therapies).      Low back pain  - MRI negative for acute findings  - Tylenol now on hold due to above         Discharge Follow Up Recommendations for outpatient labs/diagnostics:   Transferring to  Hospital when bed available.  Accepting MD, Dr. Rajan Isaacs.     Day of Discharge     HPI:   Feels okay today, still febrile overnight as she was unable to get NSAIDs or Tylenol.  Still nauseated at times as well.     Review of Systems  Gen-  chills  CV- No chest pain, palpitations  Resp- No cough, dyspnea  GI- + Nausea, no diarrhea, +constipation x 2 days, mild abd  pain      Vital Signs:   Temp:  [99.6 °F (37.6 °C)-102.7 °F (39.3 °C)] 100.1 °F (37.8 °C)  Heart Rate:  [62-87] 84  Resp:  [16-18] 16  BP: (104-127)/(64-83) 104/67      Physical Exam:  Constitutional: No acute distress, awake, alert  HENT: NCAT, mucous membranes moist  Respiratory: Clear to auscultation bilaterally, respiratory effort normal   Cardiovascular: RRR, no murmurs, rubs, or gallops  Gastrointestinal: Positive bowel sounds, soft, nontender, nondistended  Musculoskeletal: No bilateral ankle edema  Psychiatric: Appropriate affect, cooperative  Neurologic: Oriented x 3, strength symmetric in all extremities, Cranial Nerves grossly intact to confrontation, speech clear  Skin: No rashes     Pertinent  and/or Most Recent Results     LAB RESULTS:      Lab 09/30/24  0041 09/29/24  0640 09/28/24  0718 09/27/24 2225 09/27/24  1801   WBC 4.10 3.42 2.89*  --  2.54*   HEMOGLOBIN 13.0 13.6 13.5  --  14.0   HEMATOCRIT 37.4 39.4 39.7  --  40.9   PLATELETS 118* 133* 123*  --  132*   NEUTROS ABS 2.67 2.77 2.40  --  2.07   IMMATURE GRANS (ABS)  --   --   --   --  0.01   LYMPHS ABS  --   --   --   --  0.31*   MONOS ABS  --   --   --   --  0.13   EOS ABS 0.00 0.00 0.00  --  0.01   MCV 91.7 92.1 93.2  --  93.0   PROCALCITONIN  --   --  0.96*  --  0.69*   LACTATE  --   --   --   --  0.8   PROTIME 18.1* 16.0*  --  16.6*  --          Lab 09/30/24  0041 09/29/24  0640 09/28/24  0718 09/27/24  1801   SODIUM 133* 135* 137 136   POTASSIUM 3.5 3.3* 3.7 3.6   CHLORIDE 96* 100 103 102   CO2 21.0* 22.0 23.0 24.0   ANION GAP 16.0* 13.0 11.0 10.0   BUN 4* 4* 5* 6   CREATININE 0.60 0.75 0.72 0.77   EGFR 127.1 112.8 118.4 109.3   GLUCOSE 108* 92 77 117*   CALCIUM 8.2* 8.0* 8.3* 8.7   MAGNESIUM  --   --  2.4  --          Lab 09/30/24  0041 09/29/24  0640 09/28/24  0718 09/27/24  1801   TOTAL PROTEIN 6.1 6.5 6.1 6.9   ALBUMIN 3.5 3.5 3.6 4.0   GLOBULIN 2.6 3.0 2.5 2.9   ALT (SGPT) 5,626* 2,972* 917* 614*   AST (SGOT) 6,391* 3,202* 869* 614*    BILIRUBIN 2.8* 2.1* 1.0 0.6   ALK PHOS 228* 251* 178* 203*   LIPASE  --   --   --  25         Lab 09/30/24  0041 09/29/24  0640 09/27/24  2225   PROTIME 18.1* 16.0* 16.6*   INR 1.49* 1.27* 1.33*                 Brief Urine Lab Results  (Last result in the past 365 days)        Color   Clarity   Blood   Leuk Est   Nitrite   Protein   CREAT   Urine HCG        09/28/24 1825               Negative             Microbiology Results (last 10 days)       Procedure Component Value - Date/Time    MRSA Screen, PCR (Inpatient) - Swab, Nares [[id number]]  (Normal) Collected: 09/27/24 2250    Lab Status: Final result Specimen: Swab from Nares Updated: 09/28/24 0906     MRSA PCR Negative    Narrative:      The negative predictive value of this diagnostic test is high and should only be used to consider de-escalating anti-MRSA therapy. A positive result may indicate colonization with MRSA and must be correlated clinically.  MRSA Negative    Blood Culture - Blood, Wrist, Right [[id number]]  (Normal) Collected: 09/27/24 2039    Lab Status: Preliminary result Specimen: Blood from Wrist, Right Updated: 09/29/24 2131     Blood Culture No growth at 2 days    Blood Culture - Blood, Arm, Right [[id number]]  (Normal) Collected: 09/27/24 1801    Lab Status: Preliminary result Specimen: Blood from Arm, Right Updated: 09/29/24 1916     Blood Culture No growth at 2 days    COVID PRE-OP / PRE-PROCEDURE SCREENING ORDER (NO ISOLATION) - Swab, Nasopharynx [[id number]]  (Normal) Collected: 09/27/24 1728    Lab Status: Final result Specimen: Swab from Nasopharynx Updated: 09/27/24 1847    Narrative:      The following orders were created for panel order COVID PRE-OP / PRE-PROCEDURE SCREENING ORDER (NO ISOLATION) - Swab, Nasopharynx.  Procedure                               Abnormality         Status                     ---------                               -----------         ------                     COVID-19 and FLU A/B PCR...[[id number]]  Normal               Final result                 Please view results for these tests on the individual orders.    COVID-19 and FLU A/B PCR, 1 HR TAT - Swab, Nasopharynx [[id number]]  (Normal) Collected: 09/27/24 1728    Lab Status: Final result Specimen: Swab from Nasopharynx Updated: 09/27/24 1847     COVID19 Not Detected     Influenza A PCR Not Detected     Influenza B PCR Not Detected    Narrative:      Fact sheet for providers: https://www.fda.gov/media/129865/download    Fact sheet for patients: https://www.fda.gov/media/793968/download    Test performed by PCR.    Urine Culture - Urine, Urine, Clean Catch [[id number]] Collected: 09/27/24 1728    Lab Status: Final result Specimen: Urine, Clean Catch Updated: 09/29/24 0941     Urine Culture >100,000 CFU/mL Mixed Beena Isolated    Narrative:      Specimen contains mixed organisms of questionable pathogenicity suggestive of contamination. If symptoms persist, suggest recollection.  Colonization of the urinary tract without infection is common. Treatment is discouraged unless the patient is symptomatic, pregnant, or undergoing an invasive urologic procedure.    Respiratory Panel PCR w/COVID-19(SARS-CoV-2) ALBERTO/ALIN/HAKAN/PAD/COR/RASHI In-House, NP Swab in UTM/VTM, 2 HR TAT - Swab, Nasopharynx [[id number]]  (Normal) Collected: 09/27/24 1728    Lab Status: Final result Specimen: Swab from Nasopharynx Updated: 09/27/24 2200     ADENOVIRUS, PCR Not Detected     Coronavirus 229E Not Detected     Coronavirus HKU1 Not Detected     Coronavirus NL63 Not Detected     Coronavirus OC43 Not Detected     COVID19 Not Detected     Human Metapneumovirus Not Detected     Human Rhinovirus/Enterovirus Not Detected     Influenza A PCR Not Detected     Influenza B PCR Not Detected     Parainfluenza Virus 1 Not Detected     Parainfluenza Virus 2 Not Detected     Parainfluenza Virus 3 Not Detected     Parainfluenza Virus 4 Not Detected     RSV, PCR Not Detected     Bordetella pertussis pcr Not Detected      Bordetella parapertussis PCR Not Detected     Chlamydophila pneumoniae PCR Not Detected     Mycoplasma pneumo by PCR Not Detected    Narrative:      In the setting of a positive respiratory panel with a viral infection PLUS a negative procalcitonin without other underlying concern for bacterial infection, consider observing off antibiotics or discontinuation of antibiotics and continue supportive care. If the respiratory panel is positive for atypical bacterial infection (Bordetella pertussis, Chlamydophila pneumoniae, or Mycoplasma pneumoniae), consider antibiotic de-escalation to target atypical bacterial infection.            US Liver    Result Date: 9/28/2024  US LIVER Date of Exam: 9/28/2024 5:34 AM EDT Indication: elevated liver enzymes. Comparison: CT abdomen and pelvis 9/27/2024 Technique: Grayscale and color Doppler ultrasound evaluation of the right upper quadrant was performed. Findings: Visualized portions of the pancreas appear normal. Normal appearance of the liver. No focal liver lesion. The portal vein is patent with hepatopetal flow. The middle hepatic vein is patent with normal waveform. Normal appearance of the gallbladder. Normal appearance of the common bile duct measuring 0.2 cm in diameter. Normal appearance of the right kidney.     Impression: Normal right upper quadrant ultrasound. Electronically Signed: John Gallardo MD  9/28/2024 1:33 PM EDT  Workstation ID: [id number]    MRI Lumbar Spine With & Without Contrast    Result Date: 9/27/2024  MRI LUMBAR SPINE W WO CONTRAST Date of Exam: 9/27/2024 6:43 PM EDT Indication: sepsis, midline lumbar spine pain, eval for infectious process.  Comparison: None available. Technique:  Routine multiplanar/multisequence sequence images of the lumbar spine were obtained before and after the uneventful administration of Multihance.  Findings: There is normal height, alignment, and signal intensity of the lumbar vertebral bodies. Spinal cord terminates at the  L1 level with normal signal seen within the conus. Visualized paraspinal soft tissues appear within normal limits. Postcontrast images demonstrate no pathologic contrast enhancement. Axial images demonstrate: L1/2: No significant disc bulge. Facet joints are within normal limits. No spinal canal stenosis or neural foraminal narrowing. L2/3: No significant disc bulge. Facet joints appear within normal limits. No spinal canal stenosis or neural foraminal narrowing. L3/4: No significant disc bulge. Facet joints are within normal limits. No spinal canal stenosis or neural foraminal narrowing. L4/5: No significant disc bulge. There are mild degenerative facet changes bilaterally. No spinal canal stenosis or neural foraminal narrowing. 5/S1: No significant disc bulge. There are mild degenerative facet changes left greater than right. No spinal canal stenosis or neural foraminal narrowing.     Impression: 1. Mild degenerative facet change at the L4/5 and L5/S1 levels. There is no focal disc protrusion, spinal canal stenosis, or neural foraminal narrowing identified. Electronically Signed: Sal Pacheco MD  9/27/2024 7:42 PM EDT  Workstation ID: [id number]    CT Abdomen Pelvis With Contrast    Result Date: 9/27/2024  CT ABDOMEN PELVIS W CONTRAST Date of Exam: 9/27/2024 6:10 PM EDT Indication: low back pain, fever, tachycardia. Comparison: None available. Technique: Axial CT images were obtained of the abdomen and pelvis following the uneventful intravenous administration of Isovue-300, 97 mL. Reconstructed coronal and sagittal images were also obtained. Automated exposure control and iterative construction methods were used. Findings: Lung Bases: The visualized lung bases and lower mediastinal structures are unremarkable. Peritoneum: No free intraperitoneal air is visualized. Small to moderate volume free fluid is visualized in the pelvis. Abdominal wall: Unremarkable. Liver: Liver is normal in size and contour. No focal  lesions. Biliary/Gallbladder: The gallbladder is normal without evidence of radiopaque gallstones. The biliary tree is nondilated. Pancreas: Pancreas is within normal limits. There is no evidence of pancreatic mass or peripancreatic inflammatory changes. Spleen: Spleen is normal in size and contour. Gastrointestinal/Mesentery: No evidence of bowel obstruction or gross inflammatory changes. The appendix appears within normal limits.  Adrenals: Adrenal glands are unremarkable. Kidneys: The kidneys are in anatomic position. No evidence of nephrolithiasis. No evidence of hydronephrosis or significant perinephric fat stranding. Bladder: The urinary bladder is unremarkable. Reproductive organs:  The uterus and ovaries appear within millimeters on CT. Lymph Nodes: No significant adenopathy is identified. Vasculature: Unremarkable. Osseous Structures:  No acute fracture or aggressive lesions.     Impression: Small to moderate volume free fluid in the pelvis. Finding may be physiologic and possibly secondary to recently ruptured ovarian cyst however, in the setting of fever or signs of sepsis, underlying infectious process not excluded. Clinically correlate. Otherwise, unremarkable contrast-enhanced CT of the abdomen and pelvis. Electronically Signed: Albert Chau MD  9/27/2024 6:32 PM EDT  Workstation ID: [id number]                 Plan for Follow-up of Pending Labs/Results:   Pending Labs       Order Current Status    Anti-Smooth Muscle Antibody Titer In process    Blood Culture - Blood, Hand, Right In process    Blood Culture - Blood, Hand, Right In process    CMV DNA, Quantitative, PCR In process    Chlamydia trachomatis, Neisseria gonorrhoeae, Trichomonas vaginalis, PCR - Urine, Urine, Clean Catch In process    EBV Antibody Profile In process    Ehrlichia Profile DNA PCR In process    Ileana-Barr Virus, Quant In process    Hepatitis B DNA, Quantitative, PCR In process    Mitochondrial Antibodies, M2 In process     Protein Elec + Interp, Serum In process    Rickettsia Species DNA, Real-Time PCR In process    Blood Culture - Blood, Arm, Right Preliminary result    Blood Culture - Blood, Wrist, Right Preliminary result          Discharge Details        Discharge Medications        New Medications        Instructions Start Date   dextrose 5 % solution 1,000 mL with acetylcysteine 200 MG/ML solution 6,000 mg   6.25 mg/kg/hr (345 mg/hr), Intravenous, Continuous      doxycycline 100 mg in sodium chloride 0.9 % 100 mL IVPB   100 mg, Intravenous, Every 12 Hours             Stop These Medications      cyclobenzaprine 10 MG tablet  Commonly known as: FLEXERIL     diclofenac 75 MG EC tablet  Commonly known as: VOLTAREN              Allergies   Allergen Reactions    Vancomycin Itching     Infusion rate related         Discharge Disposition:      Diet: Regular House Diet  Hospital:           Activity:      Restrictions or Other Recommendations:         CODE STATUS:    Code Status and Medical Interventions: CPR (Attempt to Resuscitate); Full Support   Ordered at: 09/27/24 1885     Code Status (Patient has no pulse and is not breathing):    CPR (Attempt to Resuscitate)     Medical Interventions (Patient has pulse or is breathing):    Full Support       Future Appointments   Date Time Provider Department Center   5/29/2025  3:30 PM Margaux Gallardo APRN [facility]  [facility]                 Ellie Baumann MD  09/30/24      Time Spent on Discharge:  I spent  35  minutes on this discharge activity which included: face-to-face encounter with the patient, reviewing the data in the system, coordination of the care with the nursing staff as well as consultants, documentation, and entering orders.           Electronically signed by Ellie Baumann MD at 09/30/24 1476

## 2024-10-02 LAB
A PHAGOCYTOPH DNA BLD QL NAA+PROBE: NEGATIVE
BACTERIA SPEC AEROBE CULT: NORMAL
CMV DNA SERPL NAA+PROBE-ACNC: NEGATIVE IU/ML
CMV DNA SERPL NAA+PROBE-LOG IU: NORMAL LOG10 IU/ML
EHRLICHIA DNA SPEC QL NAA+PROBE: NEGATIVE

## 2024-10-04 LAB
BACTERIA SPEC AEROBE CULT: ABNORMAL
GRAM STN SPEC: ABNORMAL
ISOLATED FROM: ABNORMAL
RICKETTSIA RICKETTSII DNA, RT: NOT DETECTED

## 2024-10-05 LAB
BACTERIA SPEC AEROBE CULT: NORMAL
BACTERIA SPEC AEROBE CULT: NORMAL

## 2024-10-07 LAB — REF LAB TEST METHOD: NORMAL

## 2024-10-16 ENCOUNTER — TELEPHONE (OUTPATIENT)
Dept: OBSTETRICS AND GYNECOLOGY | Facility: CLINIC | Age: 24
End: 2024-10-16
Payer: COMMERCIAL

## 2024-10-16 NOTE — TELEPHONE ENCOUNTER
Pt wanted to discuss abdominal CT results form 9/24/24. She was concerned that she had ovarian cysts or fluid in her uterus. She is currently having no problems or pain. Uterus and ovaries appear normal according to CT report. Advised pt that if she has a return of pelvic pain she can come in for gyn US to r/o cysts or other problems. If she has severe pelvic pain, she should return to ER. Pt YONG.

## 2025-05-29 ENCOUNTER — OFFICE VISIT (OUTPATIENT)
Dept: OBSTETRICS AND GYNECOLOGY | Facility: CLINIC | Age: 25
End: 2025-05-29
Payer: COMMERCIAL

## 2025-05-29 VITALS
BODY MASS INDEX: 30.82 KG/M2 | HEIGHT: 60 IN | SYSTOLIC BLOOD PRESSURE: 98 MMHG | WEIGHT: 157 LBS | DIASTOLIC BLOOD PRESSURE: 60 MMHG

## 2025-05-29 DIAGNOSIS — L68.9 EXCESS BODY AND FACIAL HAIR: ICD-10-CM

## 2025-05-29 DIAGNOSIS — Z01.419 ROUTINE GYNECOLOGICAL EXAMINATION: Primary | ICD-10-CM

## 2025-05-29 PROCEDURE — 99395 PREV VISIT EST AGE 18-39: CPT | Performed by: NURSE PRACTITIONER

## 2025-05-29 NOTE — PROGRESS NOTES
Gynecologic Annual Exam Note        Gynecologic Exam (Annual )        Subjective     HPI  Madalyn Alexandre is a 25 y.o.  female who presents for annual well woman exam as a established patient. There were no changes to her medical or surgical history since her last visit.. Patient's last menstrual period was 2025 (approximate).  Her periods are irregular as she has not had one since Feb until the period she had in May.  The flow is moderate. She reports dysmenorrhea is moderate occurring first 1-2 days of flow.     Marital Status: co-habitating.  She is sexually active. She has not had new partners. STD testing recommendations have been explained to the patient and she does not desire STD testing.    The patient would like to discuss the following complaints today: an increase in facial and body hair, weight gain    Additional OB/GYN History   contraceptive methods: Condoms  Desires to: do not start contraception  Thromboembolic Disease: none  History of migraines: no  Age of menarche: 7    History of STD: yes GC/CHLAMYDIA     Last Pap : 2023. Results: negative. HPV: negative.   Last Completed Pap Smear            Upcoming       PAP SMEAR (Every 3 Years) Next due on 2023  LIQUID-BASED PAP SMEAR WITH HPV GENOTYPING REGARDLESS OF INTERPRETATION ([facility]I,[facility])    2022  SCANNED - PAP SMEAR    2022  SCANNED - PAP SMEAR    2020  Pap IG, Ct-Ng, Rfx HPV ASCU    2019  Done - Negative, HPV negative     Only the first 5 history entries have been loaded, but more history exists.                           History of abnormal Pap smear: no  Gardasil status:completed  Family history of uterine, colon, breast, or ovarian cancer: no  Performs monthly Self-Breast Exam: yes  Exercises Regularly:yes  Feelings of Anxiety or Depression: no  Tobacco Usage?: No     No current outpatient medications on file.     Patient denies the need for medication refills  "today.    OB History          0    Para   0    Term   0       0    AB   0    Living   0         SAB   0    IAB   0    Ectopic   0    Molar   0    Multiple   0    Live Births   0                Health Maintenance   Topic Date Due    MOST FORM  Never done    ANNUAL PHYSICAL  Never done    COVID-19 Vaccine (3 - 2024-25 season) 2024    Annual Gynecologic Pelvic and Breast Exam  2025    INFLUENZA VACCINE  2025    TDAP/TD VACCINES (2 - Td or Tdap) 2025    PAP SMEAR  2026    HEPATITIS C SCREENING  Completed    Pneumococcal Vaccine 0-49  Aged Out    CHLAMYDIA SCREENING  Discontinued       Past Medical History:   Diagnosis Date    Abnormal Pap smear of cervix 22    Chlamydia 10/06/2020    Elevated prolactin level     Irregular menses         Past Surgical History:   Procedure Laterality Date    NO PAST SURGERIES         The additional following portions of the patient's history were reviewed and updated as appropriate: allergies, current medications, past family history, past medical history, past social history, past surgical history, and problem list.    Review of Systems   Constitutional: Negative.    HENT: Negative.     Eyes: Negative.    Respiratory: Negative.     Cardiovascular: Negative.    Gastrointestinal: Negative.    Endocrine: Negative.    Genitourinary: Negative.         Weight gain, increase in body hair   Musculoskeletal: Negative.    Skin: Negative.         Excess body hair   Allergic/Immunologic: Negative.    Neurological: Negative.    Hematological: Negative.    Psychiatric/Behavioral: Negative.           I have reviewed and agree with the HPI, ROS, and historical information as entered above. Anupam Pratt, APRN          Objective   BP 98/60   Ht 152.4 cm (60\")   Wt 71.2 kg (157 lb)   LMP 2025 (Approximate)   BMI 30.66 kg/m²     Physical Exam  Vitals and nursing note reviewed. Exam conducted with a chaperone present.   Constitutional:      "  Appearance: Normal appearance. She is well-developed.   HENT:      Head: Normocephalic and atraumatic.   Neck:      Thyroid: No thyroid mass or thyromegaly.   Cardiovascular:      Rate and Rhythm: Normal rate.      Heart sounds: No murmur heard.  Pulmonary:      Effort: Pulmonary effort is normal. No retractions.      Breath sounds: No wheezing, rhonchi or rales.   Chest:      Chest wall: No mass or tenderness.   Breasts:     Right: Normal. No mass, nipple discharge, skin change or tenderness.      Left: Normal. No mass, nipple discharge, skin change or tenderness.   Abdominal:      Palpations: Abdomen is soft. Abdomen is not rigid. There is no mass.      Tenderness: There is no abdominal tenderness. There is no guarding.      Hernia: No hernia is present.   Genitourinary:     General: Normal vulva.      Exam position: Lithotomy position.      Labia:         Right: No rash, tenderness or lesion.         Left: No rash, tenderness or lesion.       Vagina: Normal. No vaginal discharge or lesions.      Cervix: No cervical motion tenderness, discharge, lesion or cervical bleeding.      Uterus: Normal. Not enlarged, not fixed and not tender.       Adnexa: Right adnexa normal and left adnexa normal.        Right: No mass or tenderness.          Left: No mass or tenderness.        Rectum: Normal. No external hemorrhoid.   Musculoskeletal:      Cervical back: Normal range of motion. No muscular tenderness.   Neurological:      Mental Status: She is alert and oriented to person, place, and time.   Psychiatric:         Behavior: Behavior normal.            Assessment and Plan    Problem List Items Addressed This Visit    None  Visit Diagnoses         Routine gynecological examination    -  Primary      Excess body and facial hair                GYN annual well woman exam.   Reviewed pap guidelines. Pap not due.  Body hair: pt reports she has always had dark body hair but it has increased. Pt is . We discussed reasons  for excess hair and treatments available. She is not interested in hormonal treatment such as birth control or spironolactone. She is ok with no treatment for now.  Encouraged use of condoms for STD prevention.  Fibrocystic breast changes - Encouraged decreasing caffeine, supportive bra, low dose vitamin E supplementation.  Reviewed monthly self breast exams.  Instructed to call with lumps, pain, or breast discharge.    Reviewed exercise as a preventative health measures.   Reccommended Flu Vaccine in Fall of each year.  RTC in 1 year or PRN with problems  Return in about 1 year (around 5/29/2026) for Annual physical.    Anupam Pratt, APRN  05/29/2025